# Patient Record
Sex: MALE | Race: WHITE
[De-identification: names, ages, dates, MRNs, and addresses within clinical notes are randomized per-mention and may not be internally consistent; named-entity substitution may affect disease eponyms.]

---

## 2020-03-04 ENCOUNTER — HOSPITAL ENCOUNTER (INPATIENT)
Dept: HOSPITAL 62 - ER | Age: 85
LOS: 26 days | Discharge: NURSING, REHAB OR LONG-TERM CARE | DRG: 193 | End: 2020-03-30
Attending: INTERNAL MEDICINE | Admitting: INTERNAL MEDICINE
Payer: MEDICARE

## 2020-03-04 DIAGNOSIS — R13.12: ICD-10-CM

## 2020-03-04 DIAGNOSIS — D64.9: ICD-10-CM

## 2020-03-04 DIAGNOSIS — E83.51: ICD-10-CM

## 2020-03-04 DIAGNOSIS — I50.23: ICD-10-CM

## 2020-03-04 DIAGNOSIS — Z78.1: ICD-10-CM

## 2020-03-04 DIAGNOSIS — J10.08: Primary | ICD-10-CM

## 2020-03-04 DIAGNOSIS — S37.29XA: ICD-10-CM

## 2020-03-04 DIAGNOSIS — J44.1: ICD-10-CM

## 2020-03-04 DIAGNOSIS — I11.0: ICD-10-CM

## 2020-03-04 DIAGNOSIS — K50.90: ICD-10-CM
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
## 2020-03-04 DIAGNOSIS — F03.90: ICD-10-CM

## 2020-03-04 DIAGNOSIS — J44.0: ICD-10-CM

## 2020-03-04 DIAGNOSIS — T50.915A: ICD-10-CM

## 2020-03-04 DIAGNOSIS — Z87.891: ICD-10-CM

## 2020-03-04 DIAGNOSIS — E78.5: ICD-10-CM

## 2020-03-04 DIAGNOSIS — R00.1: ICD-10-CM

## 2020-03-04 DIAGNOSIS — R31.9: ICD-10-CM

## 2020-03-04 DIAGNOSIS — E87.1: ICD-10-CM

## 2020-03-04 DIAGNOSIS — R33.9: ICD-10-CM

## 2020-03-04 DIAGNOSIS — J96.01: ICD-10-CM

## 2020-03-04 DIAGNOSIS — I48.19: ICD-10-CM

## 2020-03-04 DIAGNOSIS — J15.4: ICD-10-CM

## 2020-03-04 LAB
A TYPE INFLUENZA AG: POSITIVE
ADD MANUAL DIFF: NO
ALBUMIN SERPL-MCNC: 4.6 G/DL (ref 3.5–5)
ALP SERPL-CCNC: 90 U/L (ref 38–126)
ANION GAP SERPL CALC-SCNC: 17 MMOL/L (ref 5–19)
AST SERPL-CCNC: 31 U/L (ref 17–59)
B INFLUENZA AG: NEGATIVE
BASE EXCESS BLDV CALC-SCNC: -4.5 MMOL/L
BASOPHILS # BLD AUTO: 0.1 10^3/UL (ref 0–0.2)
BASOPHILS NFR BLD AUTO: 0.5 % (ref 0–2)
BILIRUB DIRECT SERPL-MCNC: 0.9 MG/DL (ref 0–0.4)
BILIRUB SERPL-MCNC: 2.3 MG/DL (ref 0.2–1.3)
BUN SERPL-MCNC: 16 MG/DL (ref 7–20)
CALCIUM: 8.8 MG/DL (ref 8.4–10.2)
CHLORIDE SERPL-SCNC: 95 MMOL/L (ref 98–107)
CK SERPL-CCNC: 128 U/L (ref 55–170)
CO2 SERPL-SCNC: 22 MMOL/L (ref 22–30)
EOSINOPHIL # BLD AUTO: 0.1 10^3/UL (ref 0–0.6)
EOSINOPHIL NFR BLD AUTO: 0.5 % (ref 0–6)
ERYTHROCYTE [DISTWIDTH] IN BLOOD BY AUTOMATED COUNT: 18.3 % (ref 11.5–14)
GLUCOSE SERPL-MCNC: 136 MG/DL (ref 75–110)
HCO3 BLDV-SCNC: 22.2 MMOL/L (ref 20–32)
HCT VFR BLD CALC: 36.4 % (ref 37.9–51)
HGB BLD-MCNC: 11.8 G/DL (ref 13.5–17)
INR PPP: 1.44
LYMPHOCYTES # BLD AUTO: 4.6 10^3/UL (ref 0.5–4.7)
LYMPHOCYTES NFR BLD AUTO: 25.8 % (ref 13–45)
MCH RBC QN AUTO: 29.2 PG (ref 27–33.4)
MCHC RBC AUTO-ENTMCNC: 32.4 G/DL (ref 32–36)
MCV RBC AUTO: 90 FL (ref 80–97)
MONOCYTES # BLD AUTO: 1.2 10^3/UL (ref 0.1–1.4)
MONOCYTES NFR BLD AUTO: 6.7 % (ref 3–13)
NEUTROPHILS # BLD AUTO: 12 10^3/UL (ref 1.7–8.2)
NEUTS SEG NFR BLD AUTO: 66.5 % (ref 42–78)
PCO2 BLDV: 46.8 MMHG (ref 35–63)
PH BLDV: 7.29 [PH] (ref 7.3–7.42)
PLATELET # BLD: 244 10^3/UL (ref 150–450)
POTASSIUM SERPL-SCNC: 4.4 MMOL/L (ref 3.6–5)
PROT SERPL-MCNC: 7.7 G/DL (ref 6.3–8.2)
PROTHROMBIN TIME: 17.7 SEC (ref 11.4–15.4)
RBC # BLD AUTO: 4.04 10^6/UL (ref 4.35–5.55)
TOTAL CELLS COUNTED % (AUTO): 100 %
TROPONIN I SERPL-MCNC: 0.06 NG/ML
WBC # BLD AUTO: 18 10^3/UL (ref 4–10.5)

## 2020-03-04 PROCEDURE — 96375 TX/PRO/DX INJ NEW DRUG ADDON: CPT

## 2020-03-04 PROCEDURE — 84439 ASSAY OF FREE THYROXINE: CPT

## 2020-03-04 PROCEDURE — 5A09557 ASSISTANCE WITH RESPIRATORY VENTILATION, GREATER THAN 96 CONSECUTIVE HOURS, CONTINUOUS POSITIVE AIRWAY PRESSURE: ICD-10-PCS | Performed by: INTERNAL MEDICINE

## 2020-03-04 PROCEDURE — 99291 CRITICAL CARE FIRST HOUR: CPT

## 2020-03-04 PROCEDURE — 80202 ASSAY OF VANCOMYCIN: CPT

## 2020-03-04 PROCEDURE — 96365 THER/PROPH/DIAG IV INF INIT: CPT

## 2020-03-04 PROCEDURE — 85610 PROTHROMBIN TIME: CPT

## 2020-03-04 PROCEDURE — 85027 COMPLETE CBC AUTOMATED: CPT

## 2020-03-04 PROCEDURE — 87150 DNA/RNA AMPLIFIED PROBE: CPT

## 2020-03-04 PROCEDURE — 81001 URINALYSIS AUTO W/SCOPE: CPT

## 2020-03-04 PROCEDURE — 84153 ASSAY OF PSA TOTAL: CPT

## 2020-03-04 PROCEDURE — 83540 ASSAY OF IRON: CPT

## 2020-03-04 PROCEDURE — 94668 MNPJ CHEST WALL SBSQ: CPT

## 2020-03-04 PROCEDURE — 83880 ASSAY OF NATRIURETIC PEPTIDE: CPT

## 2020-03-04 PROCEDURE — 83935 ASSAY OF URINE OSMOLALITY: CPT

## 2020-03-04 PROCEDURE — 80048 BASIC METABOLIC PNL TOTAL CA: CPT

## 2020-03-04 PROCEDURE — 85025 COMPLETE CBC W/AUTO DIFF WBC: CPT

## 2020-03-04 PROCEDURE — 82550 ASSAY OF CK (CPK): CPT

## 2020-03-04 PROCEDURE — 83550 IRON BINDING TEST: CPT

## 2020-03-04 PROCEDURE — 84484 ASSAY OF TROPONIN QUANT: CPT

## 2020-03-04 PROCEDURE — 71045 X-RAY EXAM CHEST 1 VIEW: CPT

## 2020-03-04 PROCEDURE — 83605 ASSAY OF LACTIC ACID: CPT

## 2020-03-04 PROCEDURE — 93306 TTE W/DOPPLER COMPLETE: CPT

## 2020-03-04 PROCEDURE — 80162 ASSAY OF DIGOXIN TOTAL: CPT

## 2020-03-04 PROCEDURE — 87804 INFLUENZA ASSAY W/OPTIC: CPT

## 2020-03-04 PROCEDURE — 87186 SC STD MICRODIL/AGAR DIL: CPT

## 2020-03-04 PROCEDURE — 87086 URINE CULTURE/COLONY COUNT: CPT

## 2020-03-04 PROCEDURE — 85730 THROMBOPLASTIN TIME PARTIAL: CPT

## 2020-03-04 PROCEDURE — 84295 ASSAY OF SERUM SODIUM: CPT

## 2020-03-04 PROCEDURE — 84300 ASSAY OF URINE SODIUM: CPT

## 2020-03-04 PROCEDURE — 82040 ASSAY OF SERUM ALBUMIN: CPT

## 2020-03-04 PROCEDURE — 93010 ELECTROCARDIOGRAM REPORT: CPT

## 2020-03-04 PROCEDURE — 83930 ASSAY OF BLOOD OSMOLALITY: CPT

## 2020-03-04 PROCEDURE — 74230 X-RAY XM SWLNG FUNCJ C+: CPT

## 2020-03-04 PROCEDURE — 84481 FREE ASSAY (FT-3): CPT

## 2020-03-04 PROCEDURE — 83735 ASSAY OF MAGNESIUM: CPT

## 2020-03-04 PROCEDURE — 87040 BLOOD CULTURE FOR BACTERIA: CPT

## 2020-03-04 PROCEDURE — 82533 TOTAL CORTISOL: CPT

## 2020-03-04 PROCEDURE — 82570 ASSAY OF URINE CREATININE: CPT

## 2020-03-04 PROCEDURE — 82746 ASSAY OF FOLIC ACID SERUM: CPT

## 2020-03-04 PROCEDURE — 36600 WITHDRAWAL OF ARTERIAL BLOOD: CPT

## 2020-03-04 PROCEDURE — 82272 OCCULT BLD FECES 1-3 TESTS: CPT

## 2020-03-04 PROCEDURE — 84100 ASSAY OF PHOSPHORUS: CPT

## 2020-03-04 PROCEDURE — 87077 CULTURE AEROBIC IDENTIFY: CPT

## 2020-03-04 PROCEDURE — 82962 GLUCOSE BLOOD TEST: CPT

## 2020-03-04 PROCEDURE — 94667 MNPJ CHEST WALL 1ST: CPT

## 2020-03-04 PROCEDURE — 85045 AUTOMATED RETICULOCYTE COUNT: CPT

## 2020-03-04 PROCEDURE — 80053 COMPREHEN METABOLIC PANEL: CPT

## 2020-03-04 PROCEDURE — 94799 UNLISTED PULMONARY SVC/PX: CPT

## 2020-03-04 PROCEDURE — 82728 ASSAY OF FERRITIN: CPT

## 2020-03-04 PROCEDURE — 84443 ASSAY THYROID STIM HORMONE: CPT

## 2020-03-04 PROCEDURE — 82607 VITAMIN B-12: CPT

## 2020-03-04 PROCEDURE — 82803 BLOOD GASES ANY COMBINATION: CPT

## 2020-03-04 PROCEDURE — 36415 COLL VENOUS BLD VENIPUNCTURE: CPT

## 2020-03-04 PROCEDURE — 94660 CPAP INITIATION&MGMT: CPT

## 2020-03-04 PROCEDURE — 93005 ELECTROCARDIOGRAM TRACING: CPT

## 2020-03-04 NOTE — RADIOLOGY REPORT (SQ)
EXAM DESCRIPTION: 



XR CHEST 1 VIEW



COMPLETED DATE/TME:  03/04/2020 22:01



CLINICAL HISTORY: 



86 years, Male, sob



COMPARISON:

None.



NUMBER OF VIEWS:

1



TECHNIQUE:

Portable chest



LIMITATIONS:

None.



FINDINGS:



Cardiomegaly. Calcified granuloma in the right upper lobe.

Subsegmental atelectasis in the lung bases. Equivocal/mild

interstitial edema. No pneumothorax. Atheromatous change thoracic

aorta. Osteopenia



IMPRESSION:



Cardiomegaly with equivocal/mild interstitial edema

 



copyright 2011 [employer]- All Rights Reserved

## 2020-03-04 NOTE — ER DOCUMENT REPORT
ED General





- General


Chief Complaint: Shortness Of Breath


Stated Complaint: DIFFICULTY BREATHING


Mode of Arrival: Medic


Information source: Patient, Relative, Emergency Med Personnel


Cannot obtain history due to: Unstable vital signs


Notes: 





Patient is an 86-year-old male presenting to the emergency department chief 

complaint of respiratory distress.  EMS state that they were called to the pa

[facility] [place] found the patient to be tachycardic and tachypneic they state that

on room air the patient's oxygen saturation was 77%.  On presentation to the 

emergency department the patient has received 2 DuoNeb breathing treatments 125 

mg of Solu-Medrol IV and is currently on CPAP by EMS.  They state that the 

patient was quite anxious and kept trying to take the CPAP off.  When speaking 

with family once they arrived they state that the patient as well as many other 

family members have been ill recently but the patient over the past 3 days has 

been much more severe and even more so this evening.  Prior to this event the 

patient's last illness was pneumonia approximately 2 to 3 months ago.  Patient 

denies travel history trauma history.  He does have sick contacts at home.  

Patient states he has been taking his medication as prescribed.


TRAVEL OUTSIDE OF THE [place] IN LAST 30 DAYS: No





- HPI


Onset: Last week


Onset/Duration: Gradual, Worse


Quality of pain: Pressure


Severity: Mild


Pain Level: 2


Associated symptoms: Nonproductive cough, Hurts to breath, Slow to respond


Exacerbated by: Movement, Walking, Coughing, Deep breathing


Relieved by: Denies


Similar symptoms previously: Yes


Recently seen / treated by doctor: No





- Related Data


Allergies/Adverse Reactions: 


                                        





Penicillins Allergy (Verified 03/04/20 22:01)


   











Past Medical History





- General


Information source: Patient, Relative, [facility] Records





- Social History


Smoking Status: Former Smoker


Cigarette use (# per day): No


Smoking Education Provided: No


Frequency of alcohol use: None


Drug Abuse: None


Lives with: Family


Family History: Reviewed & Not Pertinent


Patient has suicidal ideation: No


Patient has homicidal ideation: No





- Past Medical History


Cardiac Medical History: Reports: Hx Atrial Fibrillation, Hx Congestive Heart 

Failure, Hx Hypercholesterolemia, Hx Hypertension


Pulmonary Medical History: Reports: Hx COPD





Review of Systems





- Review of Systems


Notes: 





REVIEW OF SYSTEMS:





CONSTITUTIONAL : Per HPI





EENT:   Denies eye, ear, throat, or mouth pain or symptoms.  Denies nasal or 

sinus congestion.





CARDIOVASCULAR:  Denies chest pain.





RESPIRATORY: Per HPI





GASTROINTESTINAL: Patient reports palpitations tachycardia and exacerbation of 

atrial fibrillation





GENITOURINARY:  Denies difficulty urinating, painful urination, burning, 

frequency, or blood in urine.








MUSCULOSKELETAL:  Denies neck or back pain or joint pain or swelling.





SKIN:   Family states patient is more pallorous than normal.





HEMATOLOGIC :   Denies easy bruising or bleeding.





NEUROLOGICAL:  Denies altered mental status or loss of consciousness.  Denies 

headache.  Denies weakness or paralysis or loss of use of either side.  Denies 

problems with gait or speech.  Denies sensory or motor loss.  However patient is

markedly anxious.





PSYCHIATRIC:  Denies suicidal or homicidal ideations





10 Systems are negative unless otherwise specified above





Physical Exam





- Vital signs


Vitals: 


                                        











Temp Pulse Ox


 


 100.2 F   89 L


 


 03/04/20 21:48  03/04/20 21:48














- Notes


Notes: 





PHYSICAL EXAMINATION:


 


GENERAL: Patient is an 86-year-old male presenting to the emergency department 

in acute respiratory distress.


 


HEAD: Atraumatic, normocephalic.


 


EYES: Pupils equal round and reactive to light, extraocular movements intact, 

sclera anicteric, conjunctiva are normal.


 


ENT: nares patent, oropharynx clear without exudates.  Moist mucous membranes.


 


NECK: Normal range of motion, supple without lymphadenopathy, no appreciable JVD


 


LUNGS: Poor air exchange positive accessory muscle use to intercostals and neck 

muscles, very quiet lung sounds


 


HEART: Irregularly irregular and tachycardic heart rate


 


ABDOMEN: Soft, nontender, normal bowel sounds.  No guarding, no rebound.  No mas

ses appreciated.


 


EXTREMITIES: Active full range of motion, no pitting or edema.  No cyanosis. 2+ 

pulses x4


 


NEUROLOGICAL: No focal neurological deficits. Moves all extremities 

spontaneously and on command.


 


SKIN: Febrile however patient is also pallorous.





Course





- Re-evaluation


Re-evalutation: 





03/05/20 00:38


At time of presentation patient was transferred to hospital bed from EMS [facility] 

IV access was obtained blood work was obtained and sent patient was changed from

CPAP by EMS to BiPAP per respiratory.  I did request 2 g of IV magnesium for 

assistance with patient's respiratory function as well as 25 mg of Cardizem IV f

ollowed by titration of Cardizem drip.  Patient received IV acetaminophen for 

management of his fever.  EKG was obtained as well as portable chest x-ray.


Patient has been maintained on a cardiac monitor the entire time in the 

emergency department.  Patient's heart rate initially on presentation was in the

130s 140s and after Cardizem bolus decelerated to the 1 teens and subsequently 

to 88 bpm.  Patient is tolerating BiPAP and has much better color.


I have reviewed patient's EKG labs and radiologic studies.  I feel it is in the 

patient's best interest for admission to the hospitalist service for management 

of COPD exacerbation and CHF exacerbation and A. fib with RVR.


03/05/20 00:42


Patient was never initiated on sepsis fluid hydration I believe his tachycardia 

was secondary to the A. fib with RVR and there is no signs of pneumonia or other

sign of infection.  I did consult with the hospitalist and he was in agreement 

no antibiotics have been initiated at this time.





- Vital Signs


Vital signs: 


                                        











Temp Pulse Resp BP Pulse Ox


 


 98.9 F      30 H  136/50 H  93 


 


 03/04/20 23:01     03/04/20 23:31  03/04/20 23:31  03/04/20 23:31














- Laboratory


Result Diagrams: 


                                 03/04/20 21:51





                                 03/04/20 21:51


Laboratory results interpreted by me: 


                                        











  03/04/20 03/04/20 03/04/20





  21:51 21:51 21:51


 


WBC  18.0 H  


 


RBC  4.04 L  


 


Hgb  11.8 L  


 


Hct  36.4 L  


 


RDW  18.3 H  


 


Absolute Neuts (auto)  12.0 H  


 


PT   17.7 H 


 


VBG pH   


 


Sodium    134.3 L


 


Chloride    95 L


 


Glucose    136 H


 


POC Glucose   


 


Lactic Acid   


 


Total Bilirubin    2.3 H


 


Direct Bilirubin    0.9 H


 


NT-Pro-B Natriuret Pep   














  03/04/20 03/04/20 03/04/20





  21:51 21:51 21:51


 


WBC   


 


RBC   


 


Hgb   


 


Hct   


 


RDW   


 


Absolute Neuts (auto)   


 


PT   


 


VBG pH    7.29 L


 


Sodium   


 


Chloride   


 


Glucose   


 


POC Glucose   


 


Lactic Acid   6.1 H 


 


Total Bilirubin   


 


Direct Bilirubin   


 


NT-Pro-B Natriuret Pep  5170 H  














  03/04/20





  22:03


 


WBC 


 


RBC 


 


Hgb 


 


Hct 


 


RDW 


 


Absolute Neuts (auto) 


 


PT 


 


VBG pH 


 


Sodium 


 


Chloride 


 


Glucose 


 


POC Glucose  225 H


 


Lactic Acid 


 


Total Bilirubin 


 


Direct Bilirubin 


 


NT-Pro-B Natriuret Pep 














- Diagnostic Test


Radiology reviewed: Reports reviewed





- EKG Interpretation by Me


Rate: Tachycardia


Rhythm: A.Fib


When compared to previous EKG there are: Previous EKG unavailable


Additional EKG results interpreted by me: 





03/05/20 00:37


EKG initially demonstrates atrial fibrillation 110 bpm there is ST depression in

anterolateral leads.  Repeat EKG performed at 2329 hrs. demonstrates A. fib with

a prolonged QT of 504 ms rate of 88 bpm.  ST depressions are still present but 

not quite as prominent.  There was no old EKG initially for comparison.





Critical Care Note





- Critical Care Note


Total time excluding time spent on procedures (mins): 40


Comments: 





Please allow 40 minutes of critical care time exclusive of separately billable 

procedures for multiple re-evaluations medical management and consultation with 

family members and hospitalist in regards to this critically ill patient.





Discharge





- Discharge


Clinical Impression: 


 Atrial fibrillation with RVR, COPD exacerbation





CHF exacerbation


Qualifiers:


 Heart failure type: unspecified Qualified Code(s): I50.9 - Heart failure, 

unspecified





Condition: Fair


Disposition: ADMITTED AS INPATIENT


Admitting Provider: Pennie


Unit Admitted: [facility]

## 2020-03-05 RX ADMIN — ENOXAPARIN SODIUM SCH MG: 40 INJECTION SUBCUTANEOUS at 11:04

## 2020-03-05 RX ADMIN — FUROSEMIDE SCH MG: 10 INJECTION, SOLUTION INTRAMUSCULAR; INTRAVENOUS at 06:01

## 2020-03-05 RX ADMIN — DIGOXIN SCH: 0.12 TABLET ORAL at 11:00

## 2020-03-05 RX ADMIN — DILTIAZEM HYDROCHLORIDE SCH MG: 60 TABLET, FILM COATED ORAL at 14:40

## 2020-03-05 RX ADMIN — METHYLPREDNISOLONE SODIUM SUCCINATE SCH MG: 40 INJECTION, POWDER, FOR SOLUTION INTRAMUSCULAR; INTRAVENOUS at 06:09

## 2020-03-05 RX ADMIN — CEFTRIAXONE SCH MLS/HR: 2 INJECTION, SOLUTION INTRAVENOUS at 21:29

## 2020-03-05 RX ADMIN — DOXYCYCLINE SCH MLS/HR: 100 INJECTION, POWDER, LYOPHILIZED, FOR SOLUTION INTRAVENOUS at 11:04

## 2020-03-05 RX ADMIN — DOXYCYCLINE SCH MLS/HR: 100 INJECTION, POWDER, LYOPHILIZED, FOR SOLUTION INTRAVENOUS at 21:30

## 2020-03-05 RX ADMIN — FUROSEMIDE SCH MG: 10 INJECTION, SOLUTION INTRAMUSCULAR; INTRAVENOUS at 17:43

## 2020-03-05 RX ADMIN — METHYLPREDNISOLONE SODIUM SUCCINATE SCH MG: 40 INJECTION, POWDER, FOR SOLUTION INTRAMUSCULAR; INTRAVENOUS at 17:47

## 2020-03-05 RX ADMIN — CAPTOPRIL SCH: 25 TABLET ORAL at 11:00

## 2020-03-05 RX ADMIN — CAPTOPRIL SCH MG: 25 TABLET ORAL at 21:28

## 2020-03-05 RX ADMIN — DILTIAZEM HYDROCHLORIDE SCH MG: 60 TABLET, FILM COATED ORAL at 21:28

## 2020-03-05 NOTE — EKG REPORT
SEVERITY:- ABNORMAL ECG -

ATRIAL FIBRILLATION

NONSPECIFIC REPOL ABNORMALITY, DIFFUSE LEADS , SLIGHTLY IMPROVED FROM EARLY  21:59 EKG, CLINICAL MARLENA
ELATION NEEDED.

PROLONGED QT INTERVAL

:

Confirmed by: Devante Serrato MD 05-Mar-2020 07:20:08

## 2020-03-05 NOTE — EKG REPORT
SEVERITY:- ABNORMAL ECG -

ATRIAL FIBRILLATION

PROBABLE LVH WITH SECONDARY REPOL ABNRM

ST DEPRESSION, CONSIDER ISCHEMIA, ANT-LAT LDS

:

Confirmed by: Devante Serrato MD 05-Mar-2020 07:20:27

## 2020-03-05 NOTE — PDOC H&P
History of Present Illness


Admission Date/PCP: 


  03/04/20 23:54





  INEZ WAYNE PA-C





History of Present Illness: 


LUIS F VELA is a 86 year old male with past medical history significant for 

gavino A. fib, HTN, HLD, COPD, Crohn's disease who presented to ED with severe 

respiratory distress that awoke him from sleep.  Patient contracted a viral 

upper respiratory illness proximately 3 days prior to admission and this became 

worse gradually causing productive cough shortness of breath/ROBERTSON.  Patient does 

not use home oxygen prior to admission.  He was requiring BiPAP to maintain 

saturations on admission.  Given steroids, nebulizers, antibiotics on admission.

 Chest x-ray did not specifically show pneumonia the lungs had scattered 

congestion on exam.  Patient noted to be in A. fib with RVR on admission with 

rates in the 150s and is promptly came down with diltiazem drip with a rate of 1

0mg/h.  Daughter is in charge of patient's medications and states he has not 

missed any doses including his digoxin and diltiazem. 








Past Medical History


Cardiac Medical History: Reports: Atrial Fibrillation, Congestive Heart Failure,

Hyperlipidema, Hypertension


Pulmonary Medical History: Reports: Chronic Obstructive Pulmonary Disease (COPD)





Past Surgical History


Past Surgical History: Reports: Vascular Surgery





Social History


Lives with: Family


Smoking Status: Former Smoker





- Advance Directive


Resuscitation Status: Do Not Resuscitate


Surrogate healthcare decision maker:: 





Daughter





Family History


Family History: Reviewed & Not Pertinent


Parental Family History Reviewed: Yes


Children Family History Reviewed: Yes


Sibling(s) Family History Reviewed.: Yes





Medication/Allergy


Allergies/Adverse Reactions: 


                                        





Penicillins Allergy (Verified 03/04/20 22:01)


   











Review of Systems


All systems: reviewed and no additional remarkable complaints except as stated -

As per HPI, otherwise negative





Physical Exam


Vital Signs: 


                                        











Temp Pulse Resp BP Pulse Ox


 


 98.9 F      27 H  122/49 L  96 


 


 03/04/20 23:01     03/05/20 00:46  03/05/20 00:46  03/05/20 00:46








                                 Intake & Output











 03/03/20 03/04/20 03/05/20





 06:59 06:59 06:59


 


Intake Total   132


 


Balance   132


 


Weight   68.039 kg











General appearance: PRESENT: cooperative, mild distress


Head exam: PRESENT: atraumatic, normocephalic


Eye exam: PRESENT: conjunctiva pink


Mouth exam: PRESENT: moist


Respiratory exam: PRESENT: accessory muscle use, crackles, decreased breath 

sounds, tachypnea, wheezes


Cardiovascular exam: PRESENT: irregular rhythm.  ABSENT: diastolic murmur, rubs,

systolic murmur, tachycardia


GI/Abdominal exam: PRESENT: normal bowel sounds, soft.  ABSENT: distended, 

guarding, mass, organolmegaly, rebound, tenderness


Rectal exam: PRESENT: deferred


Extremities exam: ABSENT: pedal edema


Neurological exam: PRESENT: alert, awake, oriented to person


Psychiatric exam: PRESENT: appropriate affect, normal mood


Skin exam: PRESENT: dry, intact, warm





Results


Laboratory Results: 


                                        





                                 03/04/20 21:51 





                                 03/04/20 21:51 





                                        











  03/04/20 03/04/20 03/04/20





  21:51 21:51 21:51


 


WBC  18.0 H  


 


RBC  4.04 L  


 


Hgb  11.8 L  


 


Hct  36.4 L  


 


MCV  90  


 


MCH  29.2  


 


MCHC  32.4  


 


RDW  18.3 H  


 


Plt Count  244  


 


Seg Neutrophils %  66.5  


 


VBG pH   


 


VBG pCO2   


 


VBG HCO3   


 


VBG Base Excess   


 


Sodium   134.3 L 


 


Potassium   4.4 


 


Chloride   95 L 


 


Carbon Dioxide   22 


 


Anion Gap   17 


 


BUN   16 


 


Creatinine   1.03 


 


Est GFR ( Amer)   > 60 


 


Glucose   136 H 


 


Lactic Acid    6.1 H


 


Calcium   8.8 


 


Total Bilirubin   2.3 H 


 


AST   31 


 


Alkaline Phosphatase   90 


 


Total Protein   7.7 


 


Albumin   4.6 














  03/04/20 03/05/20





  21:51 00:20


 


WBC  


 


RBC  


 


Hgb  


 


Hct  


 


MCV  


 


MCH  


 


MCHC  


 


RDW  


 


Plt Count  


 


Seg Neutrophils %  


 


VBG pH  7.29 L 


 


VBG pCO2  46.8 


 


VBG HCO3  22.2 


 


VBG Base Excess  -4.5 


 


Sodium  


 


Potassium  


 


Chloride  


 


Carbon Dioxide  


 


Anion Gap  


 


BUN  


 


Creatinine  


 


Est GFR (African Amer)  


 


Glucose  


 


Lactic Acid   1.8


 


Calcium  


 


Total Bilirubin  


 


AST  


 


Alkaline Phosphatase  


 


Total Protein  


 


Albumin  








                                        











  03/04/20 03/04/20 03/05/20





  21:51 21:51 00:20


 


Creatine Kinase  128  


 


Troponin I   0.057  0.152


 


NT-Pro-B Natriuret Pep   5170 H 











Impressions: 


                                        





Chest X-Ray  03/04/20 22:01


IMPRESSION:


 


Cardiomegaly with equivocal/mild interstitial edema


 


 


copyright 2011 [employer]- All Rights Reserved


 














Assessment and Plan





- Diagnosis


(1) Atypical pneumonia


Is this a current diagnosis for this admission?: Yes   


Plan: 





Suspect atypical pneumonia, possibly gram-negative, likely bacterial infection 

followed viral illness


Ceftriaxone/doxycycline (azithromycin cannot be used due to very long QTC on 

admission)


Respiratory culture


Oxygen as needed with goal 89 to 94%


Bronchial hygiene








(2) COPD exacerbation


Is this a current diagnosis for this admission?: Yes   


Plan: 





Antibiotics


Nebulizer treatments


Steroids, transition to oral when able








(3) CHF exacerbation


Qualifiers: 


   Heart failure type: unspecified   Qualified Code(s): I50.9 - Heart failure, 

unspecified   


Is this a current diagnosis for this admission?: Yes   


Plan: 





BNP elevated to over 5000; lungs slightly wet on exam


Likely due to A. fib, expect improvement with rate control


Continue home cardiac medications


Avoid IV fluids


Low-dose IV Lasix then reassess volume status and hemodynamics; may not need 

this long-term








(4) Atrial fibrillation with RVR


Is this a current diagnosis for this admission?: Yes   


Plan: 





Acute on chronic


Likely due to respiratory failure and hypoxia


Diltiazem IV, transition back to oral diltiazem when able


Digoxin restarted








(5) HTN (hypertension)


Is this a current diagnosis for this admission?: Yes   


Plan: 





Home medications selectively restarted








(6) Crohn's disease


Is this a current diagnosis for this admission?: Yes   


Plan: 





Takes Pentasa at home, resume this after confirming dose with pharmacy, 

daughter did not recall dose on admission; suspect it will be 1 g taken 4 times 

daily








(7) Dementia


Is this a current diagnosis for this admission?: Yes   


Plan: 





Progressive memory loss and cognitive impairment per daughter, never officially

diagnosed with dementia per her


Suspect Alzheimer's








(8) HLD (hyperlipidemia)


Is this a current diagnosis for this admission?: Yes   


Plan: 





Statin








(9) Chronic a-fib


Is this a current diagnosis for this admission?: Yes   


Plan: 





As above








- Time


Time Spent with patient: 35 or more minutes


Medications reviewed and adjusted accordingly: Yes





- Inpatient Certification


Medical Necessity: Significant Comorbidiites Make Outpatient Treatment Too 

Risky, Need Close Monitoring Due to Risk of Patient Decompensation, Need for IV 

Antibiotics

## 2020-03-05 NOTE — ADVANCED CARE
- Diagnosis


(1) Atypical pneumonia


Diagnosis Current: Yes   





(2) COPD exacerbation


Diagnosis Current: Yes   





(3) CHF exacerbation


Diagnosis Current: Yes   





(4) Atrial fibrillation with RVR


Diagnosis Current: Yes   





(5) HTN (hypertension)


Diagnosis Current: Yes   





(6) Crohn's disease


Diagnosis Current: Yes   





(7) Dementia


Diagnosis Current: Yes   





(8) HLD (hyperlipidemia)


Diagnosis Current: Yes   





(9) Chronic a-fib


Diagnosis Current: Yes   


Attendance: 





patient


daughter


Resuscitation Status: Do Not Resuscitate


Discussion: 





All aspects of CODE STATUS discussed including chest compressions, 

cardioversion, intubation, patient stated he wishes to be DNR/DNI.  His daughter

confirmed these wishes and states she is his chosen and POA, patient agreed.  

Jessica Wright [phone]


Time Spent: 17 minutes

## 2020-03-05 NOTE — PROGRESS NOTE
Provider Note


Provider Note: 





3/5/2020


Patient is flu A positive.  Patient was sick for 3 days prior to coming into the

hospital, with upper respiratory symptoms


Patient states he is feeling some better today as opposed to when he was 

admitted.  Patient lives with his daughter and son-in-law here in [place]. 

Patient is out of the window for any type of antiviral medications

## 2020-03-06 LAB
%HYPO/RBC NFR BLD AUTO: SLIGHT %
ABSOLUTE LYMPHOCYTES# (MANUAL): 0.4 10^3/UL (ref 0.5–4.7)
ABSOLUTE MONOCYTES # (MANUAL): 0.7 10^3/UL (ref 0.1–1.4)
ADD MANUAL DIFF: YES
ANION GAP SERPL CALC-SCNC: 10 MMOL/L (ref 5–19)
ANISOCYTOSIS BLD QL SMEAR: (no result)
BASOPHILS NFR BLD MANUAL: 0 % (ref 0–2)
BUN SERPL-MCNC: 28 MG/DL (ref 7–20)
CALCIUM: 8.9 MG/DL (ref 8.4–10.2)
CHLORIDE SERPL-SCNC: 96 MMOL/L (ref 98–107)
CO2 SERPL-SCNC: 24 MMOL/L (ref 22–30)
EOSINOPHIL NFR BLD MANUAL: 0 % (ref 0–6)
ERYTHROCYTE [DISTWIDTH] IN BLOOD BY AUTOMATED COUNT: 17.4 % (ref 11.5–14)
FREE T4 (FREE THYROXINE): 1.27 NG/DL (ref 0.78–2.19)
GLUCOSE SERPL-MCNC: 164 MG/DL (ref 75–110)
HCT VFR BLD CALC: 31.9 % (ref 37.9–51)
HGB BLD-MCNC: 10.9 G/DL (ref 13.5–17)
MCH RBC QN AUTO: 29.1 PG (ref 27–33.4)
MCHC RBC AUTO-ENTMCNC: 34.1 G/DL (ref 32–36)
MCV RBC AUTO: 85 FL (ref 80–97)
MONOCYTES % (MANUAL): 5 % (ref 3–13)
PHOSPHATE SERPL-MCNC: 3.9 MG/DL (ref 2.5–4.5)
PLATELET # BLD: 189 10^3/UL (ref 150–450)
PLATELET COMMENT: ADEQUATE
POLYCHROMASIA BLD QL SMEAR: SLIGHT
POTASSIUM SERPL-SCNC: 3.8 MMOL/L (ref 3.6–5)
RBC # BLD AUTO: 3.75 10^6/UL (ref 4.35–5.55)
SEGMENTED NEUTROPHILS % (MAN): 92 % (ref 42–78)
T3FREE SERPL-MCNC: 2.85 PG/ML (ref 2.77–5.27)
TOTAL CELLS COUNTED BLD: 100
VARIANT LYMPHS NFR BLD MANUAL: 3 % (ref 13–45)
WBC # BLD AUTO: 13 10^3/UL (ref 4–10.5)

## 2020-03-06 RX ADMIN — FUROSEMIDE SCH MG: 10 INJECTION, SOLUTION INTRAMUSCULAR; INTRAVENOUS at 05:42

## 2020-03-06 RX ADMIN — METHYLPREDNISOLONE SODIUM SUCCINATE SCH: 40 INJECTION, POWDER, FOR SOLUTION INTRAMUSCULAR; INTRAVENOUS at 07:42

## 2020-03-06 RX ADMIN — CEFTRIAXONE SCH MLS/HR: 2 INJECTION, SOLUTION INTRAVENOUS at 21:08

## 2020-03-06 RX ADMIN — IPRATROPIUM BROMIDE AND ALBUTEROL SULFATE PRN ML: 2.5; .5 SOLUTION RESPIRATORY (INHALATION) at 10:20

## 2020-03-06 RX ADMIN — IPRATROPIUM BROMIDE AND ALBUTEROL SULFATE PRN ML: 2.5; .5 SOLUTION RESPIRATORY (INHALATION) at 13:46

## 2020-03-06 RX ADMIN — METHYLPREDNISOLONE SODIUM SUCCINATE SCH MG: 40 INJECTION, POWDER, FOR SOLUTION INTRAMUSCULAR; INTRAVENOUS at 21:13

## 2020-03-06 RX ADMIN — FUROSEMIDE SCH MG: 10 INJECTION, SOLUTION INTRAMUSCULAR; INTRAVENOUS at 17:42

## 2020-03-06 RX ADMIN — DOXYCYCLINE SCH MLS/HR: 100 INJECTION, POWDER, LYOPHILIZED, FOR SOLUTION INTRAVENOUS at 22:03

## 2020-03-06 RX ADMIN — IPRATROPIUM BROMIDE AND ALBUTEROL SULFATE PRN ML: 2.5; .5 SOLUTION RESPIRATORY (INHALATION) at 02:17

## 2020-03-06 RX ADMIN — ENOXAPARIN SODIUM SCH MG: 40 INJECTION SUBCUTANEOUS at 11:06

## 2020-03-06 RX ADMIN — IPRATROPIUM BROMIDE AND ALBUTEROL SULFATE PRN ML: 2.5; .5 SOLUTION RESPIRATORY (INHALATION) at 20:32

## 2020-03-06 RX ADMIN — DILTIAZEM HYDROCHLORIDE SCH MG: 60 TABLET, FILM COATED ORAL at 05:42

## 2020-03-06 RX ADMIN — DOXYCYCLINE SCH MLS/HR: 100 INJECTION, POWDER, LYOPHILIZED, FOR SOLUTION INTRAVENOUS at 11:07

## 2020-03-06 RX ADMIN — DILTIAZEM HYDROCHLORIDE SCH MG: 60 TABLET, FILM COATED ORAL at 21:13

## 2020-03-06 RX ADMIN — DILTIAZEM HYDROCHLORIDE SCH MG: 60 TABLET, FILM COATED ORAL at 13:29

## 2020-03-06 RX ADMIN — DIGOXIN SCH MG: 0.12 TABLET ORAL at 11:07

## 2020-03-06 RX ADMIN — HYDRALAZINE HYDROCHLORIDE PRN MG: 20 INJECTION INTRAMUSCULAR; INTRAVENOUS at 17:48

## 2020-03-06 NOTE — PDOC PROGRESS REPORT
Subjective


Progress Note for:: 03/06/20


Reason For Visit: 


CAP,COPD EXACERBATION,ACUTE ON CHRONIC AFIB WITH


3/6/2020


Admitted with a COPD exacerbation, acute on chronic atrial fib, atypical 

pneumonia, influenza A, CHF exacerbation, hypertension, dementia, history of 

Crohn's disease





Physical Exam


Vital Signs: 


                                        











Temp Pulse Resp BP Pulse Ox


 


 97.9 F   80   22 H  157/68 H  94 


 


 03/06/20 08:00  03/06/20 08:00  03/06/20 08:00  03/06/20 08:00  03/06/20 08:00








                                 Intake & Output











 03/05/20 03/06/20 03/07/20





 06:59 06:59 06:59


 


Intake Total 612 1349 


 


Output Total  1850 


 


Balance 612 -501 


 


Weight 64.1 kg 64.4 kg 











General appearance: PRESENT: mild distress, other - Mild respiratory distress, 

better than when admitted


Respiratory exam: PRESENT: decreased breath sounds, rhonchi


Cardiovascular exam: PRESENT: RRR.  ABSENT: diastolic murmur, rubs, systolic 

murmur


Neurological exam: PRESENT: alert, awake, oriented to person, oriented to place,

oriented to time, oriented to situation, CN II-XII grossly intact.  ABSENT: 

motor sensory deficit


Psychiatric exam: PRESENT: appropriate affect, normal mood.  ABSENT: homicidal 

ideation, suicidal ideation





Results


Laboratory Results: 


                                        





                                 03/06/20 07:30 





                                 03/06/20 04:41 





                                        











  03/06/20 03/06/20 03/06/20





  04:41 04:41 04:41


 


WBC  Cancelled  


 


RBC  Cancelled  


 


Hgb  Cancelled  


 


Hct  Cancelled  


 


MCV  Cancelled  


 


MCH  Cancelled  


 


MCHC  Cancelled  


 


RDW  Cancelled  


 


Plt Count  Cancelled  


 


Seg Neutrophils %  Cancelled  


 


Sodium   130.3 L 


 


Potassium   3.8 


 


Chloride   96 L 


 


Carbon Dioxide   24 


 


Anion Gap   10 


 


BUN   28 H 


 


Creatinine   0.79 


 


Est GFR ( Amer)   > 60 


 


Glucose   164 H 


 


Calcium   8.9 


 


Phosphorus   3.9 


 


Magnesium   2.6 H 


 


TSH    0.26 L














  03/06/20





  07:30


 


WBC  13.0 H


 


RBC  3.75 L


 


Hgb  10.9 L


 


Hct  31.9 L


 


MCV  85  D


 


MCH  29.1


 


MCHC  34.1


 


RDW  17.4 H


 


Plt Count  189


 


Seg Neutrophils %  Not Reportable


 


Sodium 


 


Potassium 


 


Chloride 


 


Carbon Dioxide 


 


Anion Gap 


 


BUN 


 


Creatinine 


 


Est GFR (African Amer) 


 


Glucose 


 


Calcium 


 


Phosphorus 


 


Magnesium 


 


TSH 








                                        





03/04/20 22:39   Blood   Blood Culture (PCR) - Final


                            Streptococcus Species





                                        











  03/04/20 03/04/20 03/05/20





  21:51 21:51 00:20


 


Creatine Kinase  128  


 


Troponin I   0.057  0.152


 


NT-Pro-B Natriuret Pep   5170 H 











Impressions: 


                                        





Chest X-Ray  03/04/20 22:01


IMPRESSION:


 


Cardiomegaly with equivocal/mild interstitial edema


 


 


copyright 2011 [employer]- All Rights Reserved


 














Assessment and Plan





- Diagnosis


(1) Influenza A


Is this a current diagnosis for this admission?: Yes   





(2) Atrial fibrillation with RVR


Is this a current diagnosis for this admission?: Yes   





(3) Atypical pneumonia


Is this a current diagnosis for this admission?: Yes   





(4) CHF exacerbation


Qualifiers: 


   Heart failure type: unspecified   Qualified Code(s): I50.9 - Heart failure, 

unspecified   


Is this a current diagnosis for this admission?: Yes   





(5) COPD exacerbation


Is this a current diagnosis for this admission?: Yes   





(6) Crohn's disease


Is this a current diagnosis for this admission?: Yes   





(7) HTN (hypertension)


Is this a current diagnosis for this admission?: Yes   





- Plan Summary


Summary: 


3/6/2020


Temperature 97.9 pulse 76 blood pressure 144/58 O2 sat 94% on 4 L


We will have nursing try to wean him back down to 3 L


Patient has no history of thyroid disease will recheck free T3 and free T4


Medications include Lasix 10 mg IV every 12 hours Solu-Medrol 40 mg IV every 12 

hours Vibramycin 100 mg every 12 hours and Rocephin 2 g daily


Plus other incidental medications


Patient states he is breathing slightly better than on admission.  Patient lives

at home with his daughter and son-in-law


WBCs on admission were 18,000 today 13,000


Chemistry is grossly normal


1 blood culture grew out Streptococcus species


Will consult discharge planning to assess patient's needs at time of discharge





- Time


Time Spent with patient: 25-34 minutes

## 2020-03-07 LAB
APPEARANCE UR: (no result)
APTT PPP: (no result) S
BILIRUB UR QL STRIP: NEGATIVE
GLUCOSE UR STRIP-MCNC: NEGATIVE MG/DL
KETONES UR STRIP-MCNC: NEGATIVE MG/DL
NITRITE UR QL STRIP: NEGATIVE
PH UR STRIP: 6 [PH] (ref 5–9)
PROT UR STRIP-MCNC: 100 MG/DL
SP GR UR STRIP: 1.02
UROBILINOGEN UR-MCNC: NEGATIVE MG/DL (ref ?–2)

## 2020-03-07 RX ADMIN — IPRATROPIUM BROMIDE AND ALBUTEROL SULFATE PRN ML: 2.5; .5 SOLUTION RESPIRATORY (INHALATION) at 15:24

## 2020-03-07 RX ADMIN — IPRATROPIUM BROMIDE AND ALBUTEROL SULFATE PRN ML: 2.5; .5 SOLUTION RESPIRATORY (INHALATION) at 00:26

## 2020-03-07 RX ADMIN — DOXYCYCLINE SCH MLS/HR: 100 INJECTION, POWDER, LYOPHILIZED, FOR SOLUTION INTRAVENOUS at 21:43

## 2020-03-07 RX ADMIN — HYDRALAZINE HYDROCHLORIDE PRN MG: 20 INJECTION INTRAMUSCULAR; INTRAVENOUS at 09:17

## 2020-03-07 RX ADMIN — LORAZEPAM PRN MG: 2 INJECTION INTRAMUSCULAR; INTRAVENOUS at 12:30

## 2020-03-07 RX ADMIN — CEFTRIAXONE SCH MLS/HR: 2 INJECTION, SOLUTION INTRAVENOUS at 20:59

## 2020-03-07 RX ADMIN — IPRATROPIUM BROMIDE AND ALBUTEROL SULFATE PRN ML: 2.5; .5 SOLUTION RESPIRATORY (INHALATION) at 07:51

## 2020-03-07 RX ADMIN — FUROSEMIDE SCH MG: 10 INJECTION, SOLUTION INTRAMUSCULAR; INTRAVENOUS at 05:25

## 2020-03-07 RX ADMIN — FUROSEMIDE SCH MG: 10 INJECTION, SOLUTION INTRAMUSCULAR; INTRAVENOUS at 18:15

## 2020-03-07 RX ADMIN — METHYLPREDNISOLONE SODIUM SUCCINATE SCH MG: 40 INJECTION, POWDER, FOR SOLUTION INTRAMUSCULAR; INTRAVENOUS at 05:25

## 2020-03-07 RX ADMIN — DILTIAZEM HYDROCHLORIDE SCH MG: 60 TABLET, FILM COATED ORAL at 20:59

## 2020-03-07 RX ADMIN — METHYLPREDNISOLONE SODIUM SUCCINATE SCH MG: 40 INJECTION, POWDER, FOR SOLUTION INTRAMUSCULAR; INTRAVENOUS at 14:55

## 2020-03-07 RX ADMIN — IPRATROPIUM BROMIDE AND ALBUTEROL SULFATE PRN ML: 2.5; .5 SOLUTION RESPIRATORY (INHALATION) at 21:25

## 2020-03-07 RX ADMIN — IPRATROPIUM BROMIDE AND ALBUTEROL SULFATE PRN ML: 2.5; .5 SOLUTION RESPIRATORY (INHALATION) at 11:41

## 2020-03-07 RX ADMIN — DILTIAZEM HYDROCHLORIDE SCH: 60 TABLET, FILM COATED ORAL at 15:01

## 2020-03-07 RX ADMIN — DILTIAZEM HYDROCHLORIDE SCH MG: 60 TABLET, FILM COATED ORAL at 05:24

## 2020-03-07 RX ADMIN — METHYLPREDNISOLONE SODIUM SUCCINATE SCH MG: 40 INJECTION, POWDER, FOR SOLUTION INTRAMUSCULAR; INTRAVENOUS at 20:59

## 2020-03-07 RX ADMIN — LORAZEPAM PRN MG: 2 INJECTION INTRAMUSCULAR; INTRAVENOUS at 18:58

## 2020-03-07 RX ADMIN — DIGOXIN SCH MG: 0.12 TABLET ORAL at 09:17

## 2020-03-07 RX ADMIN — DOXYCYCLINE SCH MLS/HR: 100 INJECTION, POWDER, LYOPHILIZED, FOR SOLUTION INTRAVENOUS at 12:00

## 2020-03-07 RX ADMIN — ENOXAPARIN SODIUM SCH MG: 40 INJECTION SUBCUTANEOUS at 09:17

## 2020-03-07 NOTE — RADIOLOGY REPORT (SQ)
EXAM DESCRIPTION:  CHEST SINGLE VIEW



COMPLETED DATE/TIME:  3/7/2020 1:14 pm



REASON FOR STUDY:  respiratory distress



COMPARISON:  3/4/2020



EXAM PARAMETERS:  NUMBER OF VIEWS: One view.

TECHNIQUE: Single frontal radiographic view of the chest acquired.

RADIATION DOSE: NA

LIMITATIONS: None.



FINDINGS:  LUNGS AND PLEURA: Improved aeration of the lungs.

MEDIASTINUM AND HILAR STRUCTURES: No masses.  Contour normal.

HEART AND VASCULAR STRUCTURES: Heart enlarged with mild vascular congestion improved.

BONES: No acute findings.

HARDWARE: None in the chest.

OTHER: No other significant finding.



IMPRESSION:  Improved vascular congestion.



TECHNICAL DOCUMENTATION:  JOB ID:  [id number]

 2011 [employer]- All Rights Reserved



Reading location - IP/workstation name: [email or web address]

## 2020-03-07 NOTE — PDOC PROGRESS REPORT
Subjective


Progress Note for:: 03/07/20


Reason For Visit: 


CAP,COPD EXACERBATION,ACUTE ON CHRONIC AFIB WITH


COPD exacerbation, acute on chronic atrial fib, atypical pneumonia, influenza A,

CHF exacerbation, dementia, hypertension, history of Crohn's disease





Physical Exam


Vital Signs: 


                                        











Temp Pulse Resp BP Pulse Ox


 


 97.7 F   101 H  24 H  186/57 H  91 L


 


 03/07/20 08:50  03/07/20 08:50  03/07/20 08:50  03/07/20 08:50  03/07/20 08:50








                                 Intake & Output











 03/06/20 03/07/20 03/08/20





 06:59 06:59 07:59


 


Intake Total 1349 1490 


 


Output Total 1850 1520 


 


Balance -501 -30 


 


Weight 64.4 kg 64.2 kg 











General appearance: PRESENT: mild distress, other - Respiratory distress, 

improving slowly


Respiratory exam: PRESENT: rhonchi, wheezes


Cardiovascular exam: PRESENT: RRR.  ABSENT: diastolic murmur, rubs, systolic 

murmur


Neurological exam: PRESENT: alert, awake, oriented to person, oriented to place,

oriented to time, oriented to situation, CN II-XII grossly intact.  ABSENT: 

motor sensory deficit


Psychiatric exam: PRESENT: appropriate affect, normal mood.  ABSENT: homicidal 

ideation, suicidal ideation





Results


Laboratory Results: 


                                        





                                 03/06/20 07:30 





                                 03/06/20 04:41 





                                        











  03/06/20





  04:41


 


Free T4  1.27


 


Free T3 pg/mL  2.85








                                        





03/04/20 22:39   Blood   Blood Culture (PCR) - Final


                            Streptococcus Species


03/04/20 22:39   Blood   Blood Culture - Final


                            Strep Mitis/Oralis Grp





                                        











  03/04/20 03/04/20 03/05/20





  21:51 21:51 00:20


 


Creatine Kinase  128  


 


Troponin I   0.057  0.152


 


NT-Pro-B Natriuret Pep   5170 H 











Impressions: 


                                        





Chest X-Ray  03/04/20 22:01


IMPRESSION:


 


Cardiomegaly with equivocal/mild interstitial edema


 


 


copyright 2011 [employer]- All Rights Reserved


 














Assessment and Plan





- Diagnosis


(1) Influenza A


Is this a current diagnosis for this admission?: Yes   





(2) Atrial fibrillation with RVR


Is this a current diagnosis for this admission?: Yes   





(3) Atypical pneumonia


Is this a current diagnosis for this admission?: Yes   





(4) CHF exacerbation


Qualifiers: 


   Heart failure type: unspecified   Qualified Code(s): I50.9 - Heart failure, 

unspecified   


Is this a current diagnosis for this admission?: Yes   





(5) COPD exacerbation


Is this a current diagnosis for this admission?: Yes   





(6) Crohn's disease


Is this a current diagnosis for this admission?: Yes   





(7) HTN (hypertension)


Is this a current diagnosis for this admission?: Yes   





- Plan Summary


Summary: 


3/6/2020


Temperature 97.9 pulse 76 blood pressure 144/58 O2 sat 94% on 4 L


We will have nursing try to wean him back down to 3 L


Patient has no history of thyroid disease will recheck free T3 and free T4


Medications include Lasix 10 mg IV every 12 hours Solu-Medrol 40 mg IV every 12 

hours Vibramycin 100 mg every 12 hours and Rocephin 2 g daily


Plus other incidental medications


Patient states he is breathing slightly better than on admission.  Patient lives

at home with his daughter and son-in-law


WBCs on admission were 18,000 today 13,000


Chemistry is grossly normal


1 blood culture grew out Streptococcus species


Will consult discharge planning to assess patient's needs at time of discharge





3/7/2020


Patient is afebrile 97 7 pulse is still between 90 and 100 blood pressure is 

elevated 186/57, although earlier this morning it was 144/58


I am also going to increase his Apresoline to 20 mg as needed


Patient is on captopril 50 mg every 12 hours, Cardizem 60 mg every 8 hours, 

Lasix was increased to 20 mg IV every 12 hours, Solu-Medrol was increased to 40 

mg every 8 hours.


Patient was also on Toprol XL 25 mg daily prior to admission and I will resume 

this


Patient's oxygen saturation is between 91 and 97% on 2 L nasal cannula





Patient is on IV Vibramycin as well as Rocephin, sensitivities appear to be 

appropriate for these 2 drugs


Patient is making slow progress


Patient lives at home with family.  Patient states he was not on oxygen prior to

coming in


Chest x-ray does showed pneumonia and patient also was flu A+











- Time


Time Spent with patient: 25-34 minutes

## 2020-03-08 LAB
ALBUMIN SERPL-MCNC: 3.6 G/DL (ref 3.5–5)
ALP SERPL-CCNC: 67 U/L (ref 38–126)
ANION GAP SERPL CALC-SCNC: 6 MMOL/L (ref 5–19)
ARTERIAL BLOOD FIO2: (no result)
ARTERIAL BLOOD H2CO3: 1.19 MMOL/L (ref 1.05–1.35)
ARTERIAL BLOOD HCO3: 27.5 MMOL/L (ref 20–24)
ARTERIAL BLOOD PCO2: 39.4 MMHG (ref 35–45)
ARTERIAL BLOOD PH: 7.46 (ref 7.35–7.45)
ARTERIAL BLOOD PO2: 86 MMHG (ref 80–100)
ARTERIAL BLOOD TOTAL CO2: 28.7 MMOL/L (ref 23–27)
AST SERPL-CCNC: 25 U/L (ref 17–59)
BASE EXCESS BLDA CALC-SCNC: 3.5 MMOL/L
BILIRUB DIRECT SERPL-MCNC: 0.3 MG/DL (ref 0–0.4)
BILIRUB SERPL-MCNC: 0.5 MG/DL (ref 0.2–1.3)
BUN SERPL-MCNC: 24 MG/DL (ref 7–20)
CALCIUM: 9.2 MG/DL (ref 8.4–10.2)
CHLORIDE SERPL-SCNC: 98 MMOL/L (ref 98–107)
CO2 SERPL-SCNC: 31 MMOL/L (ref 22–30)
ERYTHROCYTE [DISTWIDTH] IN BLOOD BY AUTOMATED COUNT: 17.2 % (ref 11.5–14)
GLUCOSE SERPL-MCNC: 136 MG/DL (ref 75–110)
HCT VFR BLD CALC: 29.7 % (ref 37.9–51)
HGB BLD-MCNC: 10.7 G/DL (ref 13.5–17)
MCH RBC QN AUTO: 30.3 PG (ref 27–33.4)
MCHC RBC AUTO-ENTMCNC: 36.1 G/DL (ref 32–36)
MCV RBC AUTO: 84 FL (ref 80–97)
PLATELET # BLD: 172 10^3/UL (ref 150–450)
POTASSIUM SERPL-SCNC: 3.9 MMOL/L (ref 3.6–5)
PROT SERPL-MCNC: 6.1 G/DL (ref 6.3–8.2)
RBC # BLD AUTO: 3.53 10^6/UL (ref 4.35–5.55)
SAO2 % BLDA: 97 % (ref 94–98)
WBC # BLD AUTO: 7.8 10^3/UL (ref 4–10.5)

## 2020-03-08 RX ADMIN — DILTIAZEM HYDROCHLORIDE SCH: 60 TABLET, FILM COATED ORAL at 14:53

## 2020-03-08 RX ADMIN — CEFTRIAXONE SCH MLS/HR: 2 INJECTION, SOLUTION INTRAVENOUS at 21:00

## 2020-03-08 RX ADMIN — METHYLPREDNISOLONE SODIUM SUCCINATE SCH MG: 40 INJECTION, POWDER, FOR SOLUTION INTRAMUSCULAR; INTRAVENOUS at 14:58

## 2020-03-08 RX ADMIN — DIGOXIN SCH: 0.12 TABLET ORAL at 09:49

## 2020-03-08 RX ADMIN — METHYLPREDNISOLONE SODIUM SUCCINATE SCH MG: 40 INJECTION, POWDER, FOR SOLUTION INTRAMUSCULAR; INTRAVENOUS at 05:54

## 2020-03-08 RX ADMIN — HALOPERIDOL LACTATE SCH: 5 INJECTION, SOLUTION INTRAMUSCULAR at 17:29

## 2020-03-08 RX ADMIN — DOXYCYCLINE SCH MLS/HR: 100 INJECTION, POWDER, LYOPHILIZED, FOR SOLUTION INTRAVENOUS at 22:01

## 2020-03-08 RX ADMIN — IPRATROPIUM BROMIDE AND ALBUTEROL SULFATE PRN ML: 2.5; .5 SOLUTION RESPIRATORY (INHALATION) at 20:11

## 2020-03-08 RX ADMIN — FUROSEMIDE SCH MG: 10 INJECTION, SOLUTION INTRAMUSCULAR; INTRAVENOUS at 05:53

## 2020-03-08 RX ADMIN — DILTIAZEM HYDROCHLORIDE SCH: 60 TABLET, FILM COATED ORAL at 07:25

## 2020-03-08 RX ADMIN — DILTIAZEM HYDROCHLORIDE SCH MG: 60 TABLET, FILM COATED ORAL at 21:00

## 2020-03-08 RX ADMIN — LORAZEPAM PRN MG: 2 INJECTION INTRAMUSCULAR; INTRAVENOUS at 03:55

## 2020-03-08 RX ADMIN — IPRATROPIUM BROMIDE AND ALBUTEROL SULFATE PRN ML: 2.5; .5 SOLUTION RESPIRATORY (INHALATION) at 08:12

## 2020-03-08 RX ADMIN — HYDRALAZINE HYDROCHLORIDE PRN MG: 20 INJECTION INTRAMUSCULAR; INTRAVENOUS at 04:04

## 2020-03-08 RX ADMIN — DOXYCYCLINE SCH MLS/HR: 100 INJECTION, POWDER, LYOPHILIZED, FOR SOLUTION INTRAVENOUS at 09:46

## 2020-03-08 RX ADMIN — FUROSEMIDE SCH MG: 10 INJECTION, SOLUTION INTRAMUSCULAR; INTRAVENOUS at 17:29

## 2020-03-08 RX ADMIN — LORAZEPAM PRN MG: 2 INJECTION INTRAMUSCULAR; INTRAVENOUS at 23:35

## 2020-03-08 RX ADMIN — METOPROLOL SUCCINATE SCH: 25 TABLET, EXTENDED RELEASE ORAL at 07:25

## 2020-03-08 RX ADMIN — HALOPERIDOL LACTATE SCH MG: 5 INJECTION, SOLUTION INTRAMUSCULAR at 20:52

## 2020-03-08 RX ADMIN — LORAZEPAM PRN MG: 2 INJECTION INTRAMUSCULAR; INTRAVENOUS at 10:59

## 2020-03-08 RX ADMIN — LORAZEPAM PRN MG: 2 INJECTION INTRAMUSCULAR; INTRAVENOUS at 15:20

## 2020-03-08 RX ADMIN — ENOXAPARIN SODIUM SCH MG: 40 INJECTION SUBCUTANEOUS at 09:47

## 2020-03-08 RX ADMIN — HYDRALAZINE HYDROCHLORIDE PRN MG: 20 INJECTION INTRAMUSCULAR; INTRAVENOUS at 17:30

## 2020-03-08 RX ADMIN — METHYLPREDNISOLONE SODIUM SUCCINATE SCH MG: 40 INJECTION, POWDER, FOR SOLUTION INTRAMUSCULAR; INTRAVENOUS at 21:00

## 2020-03-08 NOTE — PDOC PROGRESS REPORT
Subjective


Progress Note for:: 03/08/20


Reason For Visit: 


CAP,COPD EXACERBATION,ACUTE ON CHRONIC AFIB WITH


Atypical pneumonia, influenza A, COPD exacerbation, acute on chronic atrial fib,

CHF exacerbation, dementia, hypertension, history of Crohn's disease





Physical Exam


Vital Signs: 


                                        











Temp Pulse Resp BP Pulse Ox


 


 97.7 F   92   26 H  155/56 H  98 


 


 03/08/20 08:22  03/08/20 08:22  03/08/20 08:22  03/08/20 08:22  03/08/20 08:22








                                 Intake & Output











 03/07/20 03/08/20 03/09/20





 05:59 06:59 06:59


 


Intake Total   


 


Output Total   


 


Balance   


 


Weight   











General appearance: PRESENT: mild distress, other - Patient resting well with 

the BiPAP in place


Respiratory exam: PRESENT: decreased breath sounds, wheezes - Lungs sound 

clearer today


Cardiovascular exam: PRESENT: RRR.  ABSENT: diastolic murmur, rubs, systolic 

murmur


Neurological exam: PRESENT: altered, other - Patient resting secondary to Ativan


Psychiatric exam: PRESENT: agitated, anxious





Results


Laboratory Results: 


                                        





                                 03/08/20 06:18 





                                 03/08/20 06:18 





                                        











  03/07/20 03/08/20 03/08/20





  16:30 06:18 06:18


 


WBC   7.8 


 


RBC   3.53 L 


 


Hgb   10.7 L 


 


Hct   29.7 L 


 


MCV   84 


 


MCH   30.3 


 


MCHC   36.1 H 


 


RDW   17.2 H 


 


Plt Count   172 


 


Carbonic Acid   


 


HCO3/H2CO3 Ratio   


 


ABG pH   


 


ABG pCO2   


 


ABG pO2   


 


ABG HCO3   


 


ABG O2 Saturation   


 


ABG Base Excess   


 


FiO2   


 


Sodium    135.3 L


 


Potassium    3.9


 


Chloride    98


 


Carbon Dioxide    31 H


 


Anion Gap    6


 


BUN    24 H


 


Creatinine    0.62


 


Est GFR ( Amer)    > 60


 


Glucose    136 H


 


Lactic Acid   


 


Calcium    9.2


 


Total Bilirubin    0.5


 


AST    25


 


Alkaline Phosphatase    67


 


Total Protein    6.1 L


 


Albumin    3.6


 


Urine Color  RED  


 


Urine Appearance  SLIGHTLY-CLOUDY  


 


Urine pH  6.0  


 


Ur Specific Gravity  1.016  


 


Urine Protein  100 H  


 


Urine Glucose (UA)  NEGATIVE  


 


Urine Ketones  NEGATIVE  


 


Urine Blood  LARGE H  


 


Urine Nitrite  NEGATIVE  


 


Ur Leukocyte Esterase  NEGATIVE  


 


Urine RBC (Auto)  >182  














  03/08/20 03/08/20





  06:18 06:20


 


WBC  


 


RBC  


 


Hgb  


 


Hct  


 


MCV  


 


MCH  


 


MCHC  


 


RDW  


 


Plt Count  


 


Carbonic Acid   1.19


 


HCO3/H2CO3 Ratio   23:1


 


ABG pH   7.46 H


 


ABG pCO2   39.4


 


ABG pO2   86.0


 


ABG HCO3   27.5 H


 


ABG O2 Saturation   97.0


 


ABG Base Excess   3.5


 


FiO2   40%


 


Sodium  


 


Potassium  


 


Chloride  


 


Carbon Dioxide  


 


Anion Gap  


 


BUN  


 


Creatinine  


 


Est GFR (African Amer)  


 


Glucose  


 


Lactic Acid  1.0 


 


Calcium  


 


Total Bilirubin  


 


AST  


 


Alkaline Phosphatase  


 


Total Protein  


 


Albumin  


 


Urine Color  


 


Urine Appearance  


 


Urine pH  


 


Ur Specific Gravity  


 


Urine Protein  


 


Urine Glucose (UA)  


 


Urine Ketones  


 


Urine Blood  


 


Urine Nitrite  


 


Ur Leukocyte Esterase  


 


Urine RBC (Auto)  








                                        





03/04/20 22:39   Blood   Blood Culture (PCR) - Final


                            Streptococcus Species


03/04/20 22:39   Blood   Blood Culture - Final


                            Strep Mitis/Oralis Grp





                                        











  03/04/20 03/04/20 03/05/20





  21:51 21:51 00:20


 


Creatine Kinase  128  


 


Troponin I   0.057  0.152


 


NT-Pro-B Natriuret Pep   5170 H 














  03/07/20





  13:37


 


Creatine Kinase 


 


Troponin I 


 


NT-Pro-B Natriuret Pep  5250 H











Impressions: 


                                        





Chest X-Ray  03/07/20 00:00


IMPRESSION:  Improved vascular congestion.


 














Assessment and Plan





- Diagnosis


(1) Influenza A


Is this a current diagnosis for this admission?: Yes   





(2) Atrial fibrillation with RVR


Is this a current diagnosis for this admission?: Yes   





(3) Atypical pneumonia


Is this a current diagnosis for this admission?: Yes   





(4) CHF exacerbation


Qualifiers: 


   Heart failure type: unspecified   Qualified Code(s): I50.9 - Heart failure, 

unspecified   


Is this a current diagnosis for this admission?: Yes   





(5) COPD exacerbation


Is this a current diagnosis for this admission?: Yes   





(6) Crohn's disease


Is this a current diagnosis for this admission?: Yes   





(7) HTN (hypertension)


Is this a current diagnosis for this admission?: Yes   





- Plan Summary


Summary: 


3/6/2020


Temperature 97.9 pulse 76 blood pressure 144/58 O2 sat 94% on 4 L


We will have nursing try to wean him back down to 3 L


Patient has no history of thyroid disease will recheck free T3 and free T4


Medications include Lasix 10 mg IV every 12 hours Solu-Medrol 40 mg IV every 12 

hours Vibramycin 100 mg every 12 hours and Rocephin 2 g daily


Plus other incidental medications


Patient states he is breathing slightly better than on admission.  Patient lives

at home with his daughter and son-in-law


WBCs on admission were 18,000 today 13,000


Chemistry is grossly normal


1 blood culture grew out Streptococcus species


Will consult discharge planning to assess patient's needs at time of discharge





3/7/2020


Patient is afebrile 97 7 pulse is still between 90 and 100 blood pressure is 

elevated 186/57, although earlier this morning it was 144/58


I am also going to increase his Apresoline to 20 mg as needed


Patient is on captopril 50 mg every 12 hours, Cardizem 60 mg every 8 hours, 

Lasix was increased to 20 mg IV every 12 hours, Solu-Medrol was increased to 40 

mg every 8 hours.


Patient was also on Toprol XL 25 mg daily prior to admission and I will resume 

this


Patient's oxygen saturation is between 91 and 97% on 2 L nasal cannula





Patient is on IV Vibramycin as well as Rocephin, sensitivities appear to be 

appropriate for these 2 drugs


Patient is making slow progress


Patient lives at home with family.  Patient states he was not on oxygen prior to

coming in


Chest x-ray does showed pneumonia and patient also was flu A+





3/8/2020


Patient is more agitated when he is awake although you can speak to him and he 

will calm down.  Patient pulled out his Ball catheter yesterday and caused a 

little bit of trauma resulting in hematuria which has cleared on its own.


Patient does much better when on the BiPAP but this does require Ativan


Overall blood pressures are starting to come down


ABGs done early this morning are actually fairly normal


White count on admission was 18,000 and is now now down to 7.8


Lactic acid remains normal





Had a long conversation yesterday with the patient's daughter, Danita, I 

explained to her how sick her father was and that this could be a life-

threatening illness.  Patient told me yesterday that "he would see me in 

[facility]".  


I think we should continue on the same course of treatment since he appears to 

be gradually improving


Portable chest x-ray done yesterday is actually improved from admission











- Time


Time Spent with patient: 25-34 minutes

## 2020-03-09 LAB
ABSOLUTE LYMPHOCYTES# (MANUAL): 0.6 10^3/UL (ref 0.5–4.7)
ABSOLUTE MONOCYTES # (MANUAL): 0.1 10^3/UL (ref 0.1–1.4)
ADD MANUAL DIFF: YES
ALBUMIN SERPL-MCNC: 3.3 G/DL (ref 3.5–5)
ALP SERPL-CCNC: 64 U/L (ref 38–126)
ANION GAP SERPL CALC-SCNC: 6 MMOL/L (ref 5–19)
ANISOCYTOSIS BLD QL SMEAR: (no result)
APPEARANCE UR: (no result)
APTT BLD: 32.5 SEC (ref 23.5–35.8)
APTT PPP: YELLOW S
AST SERPL-CCNC: 28 U/L (ref 17–59)
BASOPHILS NFR BLD MANUAL: 0 % (ref 0–2)
BILIRUB DIRECT SERPL-MCNC: 0.4 MG/DL (ref 0–0.4)
BILIRUB SERPL-MCNC: 0.6 MG/DL (ref 0.2–1.3)
BILIRUB UR QL STRIP: NEGATIVE
BUN SERPL-MCNC: 31 MG/DL (ref 7–20)
CALCIUM: 9.1 MG/DL (ref 8.4–10.2)
CHLORIDE SERPL-SCNC: 96 MMOL/L (ref 98–107)
CO2 SERPL-SCNC: 32 MMOL/L (ref 22–30)
EOSINOPHIL NFR BLD MANUAL: 0 % (ref 0–6)
ERYTHROCYTE [DISTWIDTH] IN BLOOD BY AUTOMATED COUNT: 16.6 % (ref 11.5–14)
GLUCOSE SERPL-MCNC: 124 MG/DL (ref 75–110)
GLUCOSE UR STRIP-MCNC: NEGATIVE MG/DL
HCT VFR BLD CALC: 32.4 % (ref 37.9–51)
HGB BLD-MCNC: 11.3 G/DL (ref 13.5–17)
INR PPP: 1.36
KETONES UR STRIP-MCNC: NEGATIVE MG/DL
MCH RBC QN AUTO: 29.5 PG (ref 27–33.4)
MCHC RBC AUTO-ENTMCNC: 34.8 G/DL (ref 32–36)
MCV RBC AUTO: 85 FL (ref 80–97)
MONOCYTES % (MANUAL): 1 % (ref 3–13)
NITRITE UR QL STRIP: NEGATIVE
PH UR STRIP: 6 [PH] (ref 5–9)
PLATELET # BLD: 188 10^3/UL (ref 150–450)
PLATELET COMMENT: ADEQUATE
PLATELET GIANT: PRESENT
PLATELET LARGE: PRESENT
POTASSIUM SERPL-SCNC: 3.9 MMOL/L (ref 3.6–5)
PROT SERPL-MCNC: 5.9 G/DL (ref 6.3–8.2)
PROT UR STRIP-MCNC: 30 MG/DL
PROTHROMBIN TIME: 16.9 SEC (ref 11.4–15.4)
RBC # BLD AUTO: 3.82 10^6/UL (ref 4.35–5.55)
SEGMENTED NEUTROPHILS % (MAN): 92 % (ref 42–78)
SP GR UR STRIP: 1.01
TOTAL CELLS COUNTED BLD: 100
UROBILINOGEN UR-MCNC: NEGATIVE MG/DL (ref ?–2)
VARIANT LYMPHS NFR BLD MANUAL: 7 % (ref 13–45)
WBC # BLD AUTO: 8.2 10^3/UL (ref 4–10.5)
WBC TOXIC VACUOLES BLD QL SMEAR: PRESENT

## 2020-03-09 RX ADMIN — METHYLPREDNISOLONE SODIUM SUCCINATE SCH MG: 40 INJECTION, POWDER, FOR SOLUTION INTRAMUSCULAR; INTRAVENOUS at 21:17

## 2020-03-09 RX ADMIN — LORAZEPAM PRN MG: 2 INJECTION INTRAMUSCULAR; INTRAVENOUS at 23:53

## 2020-03-09 RX ADMIN — DIGOXIN SCH MG: 0.12 TABLET ORAL at 09:03

## 2020-03-09 RX ADMIN — HALOPERIDOL LACTATE SCH MG: 5 INJECTION, SOLUTION INTRAMUSCULAR at 08:51

## 2020-03-09 RX ADMIN — DILTIAZEM HYDROCHLORIDE SCH MG: 60 TABLET, FILM COATED ORAL at 06:03

## 2020-03-09 RX ADMIN — DOXYCYCLINE SCH MLS/HR: 100 INJECTION, POWDER, LYOPHILIZED, FOR SOLUTION INTRAVENOUS at 22:40

## 2020-03-09 RX ADMIN — HYDRALAZINE HYDROCHLORIDE PRN MG: 20 INJECTION INTRAMUSCULAR; INTRAVENOUS at 16:28

## 2020-03-09 RX ADMIN — FUROSEMIDE SCH MG: 10 INJECTION, SOLUTION INTRAMUSCULAR; INTRAVENOUS at 06:03

## 2020-03-09 RX ADMIN — IPRATROPIUM BROMIDE AND ALBUTEROL SULFATE PRN ML: 2.5; .5 SOLUTION RESPIRATORY (INHALATION) at 08:07

## 2020-03-09 RX ADMIN — METOPROLOL SUCCINATE SCH MG: 25 TABLET, EXTENDED RELEASE ORAL at 08:51

## 2020-03-09 RX ADMIN — CEFTRIAXONE SCH MLS/HR: 2 INJECTION, SOLUTION INTRAVENOUS at 21:17

## 2020-03-09 RX ADMIN — LORAZEPAM PRN MG: 2 INJECTION INTRAMUSCULAR; INTRAVENOUS at 10:00

## 2020-03-09 RX ADMIN — METHYLPREDNISOLONE SODIUM SUCCINATE SCH MG: 40 INJECTION, POWDER, FOR SOLUTION INTRAMUSCULAR; INTRAVENOUS at 06:02

## 2020-03-09 RX ADMIN — DILTIAZEM HYDROCHLORIDE SCH MG: 60 TABLET, FILM COATED ORAL at 13:29

## 2020-03-09 RX ADMIN — METHYLPREDNISOLONE SODIUM SUCCINATE SCH MG: 40 INJECTION, POWDER, FOR SOLUTION INTRAMUSCULAR; INTRAVENOUS at 13:28

## 2020-03-09 RX ADMIN — FUROSEMIDE SCH MG: 10 INJECTION, SOLUTION INTRAMUSCULAR; INTRAVENOUS at 18:44

## 2020-03-09 RX ADMIN — HALOPERIDOL LACTATE SCH MG: 5 INJECTION, SOLUTION INTRAMUSCULAR at 02:46

## 2020-03-09 RX ADMIN — DOXYCYCLINE SCH MLS/HR: 100 INJECTION, POWDER, LYOPHILIZED, FOR SOLUTION INTRAVENOUS at 09:51

## 2020-03-09 RX ADMIN — ENOXAPARIN SODIUM SCH MG: 40 INJECTION SUBCUTANEOUS at 09:03

## 2020-03-09 RX ADMIN — HALOPERIDOL LACTATE SCH MG: 5 INJECTION, SOLUTION INTRAMUSCULAR at 14:18

## 2020-03-09 RX ADMIN — DILTIAZEM HYDROCHLORIDE SCH MG: 60 TABLET, FILM COATED ORAL at 21:17

## 2020-03-09 RX ADMIN — HALOPERIDOL LACTATE SCH MG: 5 INJECTION, SOLUTION INTRAMUSCULAR at 21:18

## 2020-03-09 NOTE — PDOC PROGRESS REPORT
Subjective


Progress Note for:: 03/09/20


Reason For Visit: 


CAP,COPD EXACERBATION,ACUTE ON CHRONIC AFIB WITH


3/9/2020


Patient was admitted with COPD exacerbation, possible community-acquired 

pneumonia, flu a positive, acute on chronic atrial fib with RVR





Physical Exam


Vital Signs: 


                                        











Temp Pulse Resp BP Pulse Ox


 


 97.4 F   82   26 H  167/77 H  94 


 


 03/09/20 07:33  03/09/20 08:07  03/09/20 11:50  03/09/20 07:33  03/09/20 11:50








                                 Intake & Output











 03/08/20 03/09/20 03/10/20





 06:59 06:59 06:59


 


Intake Total  1115 250


 


Output Total  1100 


 


Balance  15 250


 


Weight  68.2 kg 











General appearance: PRESENT: mild distress, other - Due to confusion and 

respiratory distress


Respiratory exam: PRESENT: decreased breath sounds, rhonchi, wheezes - Patient 

really does not sound as bad as he looks, other


Cardiovascular exam: PRESENT: RRR.  ABSENT: diastolic murmur, rubs, systolic 

murmur


Neurological exam: PRESENT: altered, awake, other - confFused at times but you 

can talk to him and he will answer questions appropriately


Psychiatric exam: PRESENT: unusual affect - Confusion possible hallucinations





Results


Laboratory Results: 


                                        





                                 03/08/20 06:18 





                                 03/08/20 06:18 





                                        











  03/09/20





  08:25


 


Urine Color  YELLOW


 


Urine Appearance  SLIGHTLY-CLOUDY


 


Urine pH  6.0


 


Ur Specific Gravity  1.013


 


Urine Protein  30 H


 


Urine Glucose (UA)  NEGATIVE


 


Urine Ketones  NEGATIVE


 


Urine Blood  LARGE H


 


Urine Nitrite  NEGATIVE


 


Ur Leukocyte Esterase  NEGATIVE


 


Urine WBC (Auto)  5


 


Urine RBC (Auto)  >182








                                        











  03/04/20 03/04/20 03/05/20





  21:51 21:51 00:20


 


Creatine Kinase  128  


 


Troponin I   0.057  0.152


 


NT-Pro-B Natriuret Pep   5170 H 














  03/07/20





  13:37


 


Creatine Kinase 


 


Troponin I 


 


NT-Pro-B Natriuret Pep  5250 H











Impressions: 


                                        





Chest X-Ray  03/07/20 00:00


IMPRESSION:  Improved vascular congestion.


 














Assessment and Plan





- Diagnosis


(1) Influenza A


Is this a current diagnosis for this admission?: Yes   





(2) Atrial fibrillation with RVR


Is this a current diagnosis for this admission?: Yes   





(3) Atypical pneumonia


Is this a current diagnosis for this admission?: Yes   





(4) CHF exacerbation


Qualifiers: 


   Heart failure type: unspecified   Qualified Code(s): I50.9 - Heart failure, 

unspecified   


Is this a current diagnosis for this admission?: Yes   





(5) COPD exacerbation


Is this a current diagnosis for this admission?: Yes   





(6) Crohn's disease


Is this a current diagnosis for this admission?: Yes   





(7) HTN (hypertension)


Is this a current diagnosis for this admission?: Yes   





- Plan Summary


Summary: 


3/6/2020


Temperature 97.9 pulse 76 blood pressure 144/58 O2 sat 94% on 4 L


We will have nursing try to wean him back down to 3 L


Patient has no history of thyroid disease will recheck free T3 and free T4


Medications include Lasix 10 mg IV every 12 hours Solu-Medrol 40 mg IV every 12 

hours Vibramycin 100 mg every 12 hours and Rocephin 2 g daily


Plus other incidental medications


Patient states he is breathing slightly better than on admission.  Patient lives

at home with his daughter and son-in-law


WBCs on admission were 18,000 today 13,000


Chemistry is grossly normal


1 blood culture grew out Streptococcus species


Will consult discharge planning to assess patient's needs at time of discharge





3/7/2020


Patient is afebrile 97 7 pulse is still between 90 and 100 blood pressure is 

elevated 186/57, although earlier this morning it was 144/58


I am also going to increase his Apresoline to 20 mg as needed


Patient is on captopril 50 mg every 12 hours, Cardizem 60 mg every 8 hours, 

Lasix was increased to 20 mg IV every 12 hours, Solu-Medrol was increased to 40 

mg every 8 hours.


Patient was also on Toprol XL 25 mg daily prior to admission and I will resume 

this


Patient's oxygen saturation is between 91 and 97% on 2 L nasal cannula





Patient is on IV Vibramycin as well as Rocephin, sensitivities appear to be 

appropriate for these 2 drugs


Patient is making slow progress


Patient lives at home with family.  Patient states he was not on oxygen prior to

coming in


Chest x-ray does showed pneumonia and patient also was flu A+





3/8/2020


Patient is more agitated when he is awake although you can speak to him and he 

will calm down.  Patient pulled out his Ball catheter yesterday and caused a 

little bit of trauma resulting in hematuria which has cleared on its own.


Patient does much better when on the BiPAP but this does require Ativan


Overall blood pressures are starting to come down


ABGs done early this morning are actually fairly normal


White count on admission was 18,000 and is now now down to 7.8


Lactic acid remains normal





Had a long conversation yesterday with the patient's daughter, Danita, I 

explained to her how sick her father was and that this could be a 

life-threatening illness.  Patient told me yesterday that "[facility]  


I think we should continue on the same course of treatment since he appears to 

be gradually improving


Portable chest x-ray done yesterday is actually improved from admission





3/9/2020


This morning his temp is 97 6 pulse is anywhere from  blood pressure 

148/57 respiratory respiratory rates 25-37 on BiPAP he is between 90 and 98% 

saturation


Patient had a blood gas yesterday which was not all that terribly abnormal


Chemistry panel appears stable


1 blood culture cultures appears to be growing out a strep mitis , patient 

currently on IV doxycycline and IV Rocephin





Patient is requiring IV Haldol to keep him in the bed.  This morning I talked to

respiratory therapy about trying to switch him over to nasal cannula as he 

appears to be more anxious and agitated with the mask on than without it.  We 

will see how that goes





Patient's mental baseline was probably of some mild dementia certainly not is 

confused and altered as he is now.  I think patient's pulmonary status prior to 

admission was also marginal.





Patient's chest x-ray from 2 days ago really still shows just a cardiomegaly 

which is impressive but no acute pulmonary problems.


I will consult cardiology today to see if they have any insight




















- Time


Time Spent with patient: 25-34 minutes

## 2020-03-10 RX ADMIN — HALOPERIDOL LACTATE SCH MG: 5 INJECTION, SOLUTION INTRAMUSCULAR at 02:50

## 2020-03-10 RX ADMIN — CEFTRIAXONE SCH MLS/HR: 2 INJECTION, SOLUTION INTRAVENOUS at 21:44

## 2020-03-10 RX ADMIN — LORAZEPAM PRN MG: 2 INJECTION INTRAMUSCULAR; INTRAVENOUS at 06:00

## 2020-03-10 RX ADMIN — DILTIAZEM HYDROCHLORIDE SCH MG: 60 TABLET, FILM COATED ORAL at 06:00

## 2020-03-10 RX ADMIN — METHYLPREDNISOLONE SODIUM SUCCINATE SCH MG: 40 INJECTION, POWDER, FOR SOLUTION INTRAMUSCULAR; INTRAVENOUS at 21:44

## 2020-03-10 RX ADMIN — OSELTAMIVIR PHOSPHATE SCH MG: 75 CAPSULE ORAL at 23:21

## 2020-03-10 RX ADMIN — HALOPERIDOL LACTATE SCH MG: 5 INJECTION, SOLUTION INTRAMUSCULAR at 21:44

## 2020-03-10 RX ADMIN — FUROSEMIDE SCH MG: 10 INJECTION, SOLUTION INTRAMUSCULAR; INTRAVENOUS at 18:30

## 2020-03-10 RX ADMIN — ENOXAPARIN SODIUM SCH MG: 40 INJECTION SUBCUTANEOUS at 09:59

## 2020-03-10 RX ADMIN — FUROSEMIDE SCH MG: 10 INJECTION, SOLUTION INTRAMUSCULAR; INTRAVENOUS at 06:00

## 2020-03-10 RX ADMIN — HALOPERIDOL LACTATE SCH MG: 5 INJECTION, SOLUTION INTRAMUSCULAR at 09:59

## 2020-03-10 RX ADMIN — DOXYCYCLINE SCH MLS/HR: 100 INJECTION, POWDER, LYOPHILIZED, FOR SOLUTION INTRAVENOUS at 10:03

## 2020-03-10 RX ADMIN — OSELTAMIVIR PHOSPHATE SCH MG: 75 CAPSULE ORAL at 13:43

## 2020-03-10 RX ADMIN — METHYLPREDNISOLONE SODIUM SUCCINATE SCH MG: 40 INJECTION, POWDER, FOR SOLUTION INTRAMUSCULAR; INTRAVENOUS at 06:00

## 2020-03-10 RX ADMIN — METHYLPREDNISOLONE SODIUM SUCCINATE SCH MG: 40 INJECTION, POWDER, FOR SOLUTION INTRAMUSCULAR; INTRAVENOUS at 13:58

## 2020-03-10 RX ADMIN — DOXYCYCLINE SCH MLS/HR: 100 INJECTION, POWDER, LYOPHILIZED, FOR SOLUTION INTRAVENOUS at 23:21

## 2020-03-10 RX ADMIN — HALOPERIDOL LACTATE SCH MG: 5 INJECTION, SOLUTION INTRAMUSCULAR at 18:30

## 2020-03-10 NOTE — PDOC PROGRESS REPORT
Subjective


Progress Note for:: 03/10/20


Subjective:: 





 86 year old male with past medical history significant for chronic A. fib, HTN,

HLD, COPD, Crohn's disease who presented to ED with severe respiratory distress 

that awoke him from sleep.  Patient contracted a viral upper respiratory illness

proximately 3 days prior to admission and this became worse gradually causing 

productive cough shortness of breath/ROBERTSON.  Patient does not use home oxygen 

prior to admission.  He was requiring BiPAP to maintain saturations on 

admission.  Given steroids, nebulizers, antibiotics on admission.  Chest x-ray 

did not specifically show pneumonia the lungs had scattered congestion on exam. 

Patient noted to be in A. fib with RVR on admission with rates in the 150s and 

is promptly came down with diltiazem drip with a rate of 10mg/h.  Daughter is in

charge of patient's medications and states he has not missed any doses including

his digoxin and diltiazem. 








3/10/20 86-year-old male with history of A. fib, hypertension, COPD, Crohn's 

disease admitted with severe respiratory distress.  He is still on BiPAP this 

morning.  Admission diagnosis is a COPD exacerbation.  He came in with A. fib 

with RVR and initially on Cardizem drip now is on diltiazem, metoprolol, 

digoxin.  Heart rate is dropped to 30s.  To hold digoxin Cardizem metoprolol for

now.  And if she goes back to A. fib with RVR plan is to consult cardiology.


Reason For Visit: 


CAP,COPD EXACERBATION,ACUTE ON CHRONIC AFIB WITH








Physical Exam


Vital Signs: 


                                        











Temp Pulse Resp BP Pulse Ox


 


 97.1 F   66   24 H  146/77 H  96 


 


 03/10/20 08:41  03/10/20 08:41  03/10/20 08:41  03/10/20 08:41  03/10/20 08:41








                                 Intake & Output











 03/09/20 03/10/20 03/11/20





 06:59 06:59 06:59


 


Intake Total 1115 700 


 


Output Total 1100 1300 


 


Balance 15 -600 


 


Weight 68.2 kg 69.3 kg 











General appearance: PRESENT: other - On BiPAP.  Not communicative.


Head exam: PRESENT: atraumatic


Eye exam: PRESENT: PERRLA


Ear exam: PRESENT: normal external ear exam


Mouth exam: PRESENT: moist, tongue midline


Teeth exam: PRESENT: poor dentation


Neck exam: ABSENT: carotid bruit, JVD, lymphadenopathy, thyromegaly


Respiratory exam: PRESENT: decreased breath sounds


Cardiovascular exam: PRESENT: bradycardia


Pulses: PRESENT: normal dorsalis pedis pul


GI/Abdominal exam: PRESENT: normal bowel sounds, soft.  ABSENT: distended, 

guarding, mass, organolmegaly, rebound, tenderness


Rectal exam: PRESENT: deferred


Gentrourinary exam: PRESENT: indwelling catheter


Neurological exam: PRESENT: other - Patient is on BiPAP.  Not communicative.  

Resting comfortably.  Patient is receiving Haldol patient is not fully awake for

neurological examination.





Results


Laboratory Results: 


                                        





                                 03/09/20 14:32 





                                 03/09/20 14:32 





                                        











  03/09/20 03/09/20





  14:32 14:32


 


WBC  8.2 


 


RBC  3.82 L 


 


Hgb  11.3 L 


 


Hct  32.4 L 


 


MCV  85 


 


MCH  29.5 


 


MCHC  34.8 


 


RDW  16.6 H 


 


Plt Count  188 


 


Seg Neutrophils %  Not Reportable 


 


Sodium   133.7 L


 


Potassium   3.9


 


Chloride   96 L


 


Carbon Dioxide   32 H


 


Anion Gap   6


 


BUN   31 H


 


Creatinine   0.56


 


Est GFR (African Amer)   > 60


 


Glucose   124 H


 


Calcium   9.1


 


Total Bilirubin   0.6


 


AST   28


 


Alkaline Phosphatase   64


 


Total Protein   5.9 L


 


Albumin   3.3 L








                                        





03/04/20 21:51   Blood   Blood Culture - Final


                            NO GROWTH IN 5 DAYS





                                        











  03/04/20 03/04/20 03/05/20





  21:51 21:51 00:20


 


Creatine Kinase  128  


 


Troponin I   0.057  0.152


 


NT-Pro-B Natriuret Pep   5170 H 














  03/07/20





  13:37


 


Creatine Kinase 


 


Troponin I 


 


NT-Pro-B Natriuret Pep  5250 H











Impressions: 


                                        





Chest X-Ray  03/07/20 00:00


IMPRESSION:  Improved vascular congestion.


 














Assessment and Plan





- Diagnosis


(1) Atypical pneumonia


Is this a current diagnosis for this admission?: Yes   


Plan: 





Suspect atypical pneumonia, possibly gram-negative, likely bacterial infection 

followed viral illness


Ceftriaxone/doxycycline (azithromycin cannot be used due to very long QTC on 

admission)


Respiratory culture


Oxygen as needed with goal 89 to 94%


Bronchial hygiene





3/10/2020-patient admitted with atypical pneumonia.  Presently on ceftriaxone, 

doxycycline.  Plan BiPAP.  This chest x-ray suggestive of improved vascular 

congestion.  Cultures are negative.  Most likely patient has community-acquired 

pneumonia.








(2) COPD exacerbation


Is this a current diagnosis for this admission?: Yes   


Plan: 





Antibiotics


Nebulizer treatments


Steroids, transition to oral when able





3/10/2020-patient admitted with COPD exacerbation.  Presently receiving IV 

antibiotics, nebulizer treatments and methylprednisolone 40 mg IV every 8 hours.

 Plan is to continue to keep him on BiPAP.








(3) CHF exacerbation


Qualifiers: 


   Heart failure type: unspecified   Qualified Code(s): I50.9 - Heart failure, 

unspecified   


Is this a current diagnosis for this admission?: Yes   


Plan: 





BNP elevated to over 5000; lungs slightly wet on exam


Likely due to A. fib, expect improvement with rate control


Continue home cardiac medications


Avoid IV fluids


Low-dose IV Lasix then reassess volume status and hemodynamics; may not need 

this long-term








3/10/2020-latest chest x-ray indicated above improving vascular congestion.  BNP

is elevated at the time of admission.  Patient is on Lasix at this time.  Blood 

pressure is 132/60 stable.  Request for echocardiogram.








(4) Atrial fibrillation with RVR


Is this a current diagnosis for this admission?: Yes   


Plan: 





Acute on chronic


Likely due to respiratory failure and hypoxia


Diltiazem IV, transition back to oral diltiazem when able


Digoxin restarted








3/10/2020-patient admitted with paroxysmal A. fib with RVR.  Initially on 

diltiazem drip and now on p.o. digoxin, p.o. metoprolol, p.o. Cardizem.  Heart 

rate is going to 30s.  Plan is to hold digoxin, Nexium, metoprolol.  If the 

patient goes back into AF with RVR plan is to start on diltiazem drip and 

consult cardiology.  To request for echocardiogram.








(5) HTN (hypertension)


Is this a current diagnosis for this admission?: Yes   


Plan: 





Home medications selectively restarted





3/10/2020-blood pressure today is 140/60.  Stable.








(6) Crohn's disease


Is this a current diagnosis for this admission?: No   


Plan: 





Takes Pentasa at home, resume this after confirming dose with pharmacy, 

daughter did not recall dose on admission; suspect it will be 1 g taken 4 times 

daily








(7) Dementia


Is this a current diagnosis for this admission?: No   


Plan: 





Progressive memory loss and cognitive impairment per daughter, never officially

diagnosed with dementia per her


Suspect Alzheimer's








(8) Bradycardia


Is this a current diagnosis for this admission?: Yes   


Plan: 


3/10/2020-patient heart rate is in the 30s.  It may be secondary to medications.

 Patient is on digoxin, Cardizem, metoprolol.  Those medications will be on 

hold.  Echocardiogram is requested.








- Plan Summary


Summary: 


3/6/2020


Temperature 97.9 pulse 76 blood pressure 144/58 O2 sat 94% on 4 L


We will have nursing try to wean him back down to 3 L


Patient has no history of thyroid disease will recheck free T3 and free T4


Medications include Lasix 10 mg IV every 12 hours Solu-Medrol 40 mg IV every 12 

hours Vibramycin 100 mg every 12 hours and Rocephin 2 g daily


Plus other incidental medications


Patient states he is breathing slightly better than on admission.  Patient lives

at home with his daughter and son-in-law


WBCs on admission were 18,000 today 13,000


Chemistry is grossly normal


1 blood culture grew out Streptococcus species


Will consult discharge planning to assess patient's needs at time of discharge





3/7/2020


Patient is afebrile 97 7 pulse is still between 90 and 100 blood pressure is 

elevated 186/57, although earlier this morning it was 144/58


I am also going to increase his Apresoline to 20 mg as needed


Patient is on captopril 50 mg every 12 hours, Cardizem 60 mg every 8 hours, 

Lasix was increased to 20 mg IV every 12 hours, Solu-Medrol was increased to 40 

mg every 8 hours.


Patient was also on Toprol XL 25 mg daily prior to admission and I will resume 

this


Patient's oxygen saturation is between 91 and 97% on 2 L nasal cannula





Patient is on IV Vibramycin as well as Rocephin, sensitivities appear to be 

appropriate for these 2 drugs


Patient is making slow progress


Patient lives at home with family.  Patient states he was not on oxygen prior to

coming in


Chest x-ray does showed pneumonia and patient also was flu A+





3/8/2020


Patient is more agitated when he is awake although you can speak to him and he 

will calm down.  Patient pulled out his Ball catheter yesterday and caused a 

little bit of trauma resulting in hematuria which has cleared on its own.


Patient does much better when on the BiPAP but this does require Ativan


Overall blood pressures are starting to come down


ABGs done early this morning are actually fairly normal


White count on admission was 18,000 and is now now down to 7.8


Lactic acid remains normal





Had a long conversation yesterday with the patient's daughter, Danita, I 

explained to her how sick her father was and that this could be a life-threa

tening illness.  Patient told me yesterday that "he would see me in [facility]".  


I think we should continue on the same course of treatment since he appears to 

be gradually improving


Portable chest x-ray done yesterday is actually improved from admission





3/9/2020


This morning his temp is 97 6 pulse is anywhere from  blood pressure 

148/57 respiratory respiratory rates 25-37 on BiPAP he is between 90 and 98% 

saturation


Patient had a blood gas yesterday which was not all that terribly abnormal


Chemistry panel appears stable


1 blood culture cultures appears to be growing out a strep mitis , patient 

currently on IV doxycycline and IV Rocephin





Patient is requiring IV Haldol to keep him in the bed.  This morning I talked to

respiratory therapy about trying to switch him over to nasal cannula as he 

appears to be more anxious and agitated with the mask on than without it.  We 

will see how that goes





Patient's mental baseline was probably of some mild dementia certainly not is 

confused and altered as he is now.  I think patient's pulmonary status prior to 

admission was also marginal.





Patient's chest x-ray from 2 days ago really still shows just a cardiomegaly 

which is impressive but no acute pulmonary problems.


I will consult cardiology today to see if they have any insight

## 2020-03-11 LAB
ABSOLUTE LYMPHOCYTES# (MANUAL): 0.2 10^3/UL (ref 0.5–4.7)
ABSOLUTE MONOCYTES # (MANUAL): 0.3 10^3/UL (ref 0.1–1.4)
ADD MANUAL DIFF: YES
ALBUMIN SERPL-MCNC: 2.7 G/DL (ref 3.5–5)
ALP SERPL-CCNC: 57 U/L (ref 38–126)
ANION GAP SERPL CALC-SCNC: 6 MMOL/L (ref 5–19)
ANISOCYTOSIS BLD QL SMEAR: (no result)
AST SERPL-CCNC: 25 U/L (ref 17–59)
BASOPHILS NFR BLD MANUAL: 0 % (ref 0–2)
BILIRUB DIRECT SERPL-MCNC: 0.3 MG/DL (ref 0–0.4)
BILIRUB SERPL-MCNC: 0.5 MG/DL (ref 0.2–1.3)
BUN SERPL-MCNC: 35 MG/DL (ref 7–20)
CALCIUM: 8.7 MG/DL (ref 8.4–10.2)
CHLORIDE SERPL-SCNC: 96 MMOL/L (ref 98–107)
CO2 SERPL-SCNC: 33 MMOL/L (ref 22–30)
EOSINOPHIL NFR BLD MANUAL: 0 % (ref 0–6)
ERYTHROCYTE [DISTWIDTH] IN BLOOD BY AUTOMATED COUNT: 16.1 % (ref 11.5–14)
GLUCOSE SERPL-MCNC: 124 MG/DL (ref 75–110)
HCT VFR BLD CALC: 31.3 % (ref 37.9–51)
HGB BLD-MCNC: 11.2 G/DL (ref 13.5–17)
INR PPP: 1.34
MCH RBC QN AUTO: 29.8 PG (ref 27–33.4)
MCHC RBC AUTO-ENTMCNC: 35.8 G/DL (ref 32–36)
MCV RBC AUTO: 83 FL (ref 80–97)
MONOCYTES % (MANUAL): 7 % (ref 3–13)
NEUTS BAND NFR BLD MANUAL: 1 % (ref 3–5)
PLATELET # BLD: 158 10^3/UL (ref 150–450)
PLATELET CLUMP BLD QL SMEAR: PRESENT
PLATELET COMMENT: ADEQUATE
POTASSIUM SERPL-SCNC: 3.8 MMOL/L (ref 3.6–5)
PROT SERPL-MCNC: 5.1 G/DL (ref 6.3–8.2)
PROTHROMBIN TIME: 16.6 SEC (ref 11.4–15.4)
RBC # BLD AUTO: 3.76 10^6/UL (ref 4.35–5.55)
SEGMENTED NEUTROPHILS % (MAN): 87 % (ref 42–78)
TOTAL CELLS COUNTED BLD: 100
VARIANT LYMPHS NFR BLD MANUAL: 5 % (ref 13–45)
WBC # BLD AUTO: 3.9 10^3/UL (ref 4–10.5)

## 2020-03-11 RX ADMIN — METOPROLOL SUCCINATE SCH MG: 25 TABLET, EXTENDED RELEASE ORAL at 21:44

## 2020-03-11 RX ADMIN — OSELTAMIVIR PHOSPHATE SCH MG: 75 CAPSULE ORAL at 21:44

## 2020-03-11 RX ADMIN — CAPTOPRIL SCH MG: 25 TABLET ORAL at 21:45

## 2020-03-11 RX ADMIN — FUROSEMIDE SCH MG: 10 INJECTION, SOLUTION INTRAMUSCULAR; INTRAVENOUS at 05:32

## 2020-03-11 RX ADMIN — ENOXAPARIN SODIUM SCH MG: 40 INJECTION SUBCUTANEOUS at 09:44

## 2020-03-11 RX ADMIN — DOXYCYCLINE SCH MLS/HR: 100 INJECTION, POWDER, LYOPHILIZED, FOR SOLUTION INTRAVENOUS at 09:45

## 2020-03-11 RX ADMIN — HYDRALAZINE HYDROCHLORIDE PRN MG: 20 INJECTION INTRAMUSCULAR; INTRAVENOUS at 16:28

## 2020-03-11 RX ADMIN — HALOPERIDOL LACTATE SCH: 5 INJECTION, SOLUTION INTRAMUSCULAR at 15:17

## 2020-03-11 RX ADMIN — OSELTAMIVIR PHOSPHATE SCH MG: 75 CAPSULE ORAL at 09:45

## 2020-03-11 RX ADMIN — METHYLPREDNISOLONE SODIUM SUCCINATE SCH MG: 40 INJECTION, POWDER, FOR SOLUTION INTRAMUSCULAR; INTRAVENOUS at 05:32

## 2020-03-11 RX ADMIN — HALOPERIDOL LACTATE SCH MG: 5 INJECTION, SOLUTION INTRAMUSCULAR at 02:25

## 2020-03-11 RX ADMIN — CAPTOPRIL SCH: 25 TABLET ORAL at 09:45

## 2020-03-11 RX ADMIN — HALOPERIDOL LACTATE SCH MG: 5 INJECTION, SOLUTION INTRAMUSCULAR at 22:53

## 2020-03-11 RX ADMIN — HYDRALAZINE HYDROCHLORIDE PRN MG: 20 INJECTION INTRAMUSCULAR; INTRAVENOUS at 01:26

## 2020-03-11 RX ADMIN — DOXYCYCLINE SCH MLS/HR: 100 INJECTION, POWDER, LYOPHILIZED, FOR SOLUTION INTRAVENOUS at 22:52

## 2020-03-11 RX ADMIN — FUROSEMIDE SCH MG: 10 INJECTION, SOLUTION INTRAMUSCULAR; INTRAVENOUS at 17:45

## 2020-03-11 RX ADMIN — CEFTRIAXONE SCH MLS/HR: 2 INJECTION, SOLUTION INTRAVENOUS at 21:44

## 2020-03-11 RX ADMIN — HALOPERIDOL LACTATE SCH MG: 5 INJECTION, SOLUTION INTRAMUSCULAR at 09:44

## 2020-03-11 RX ADMIN — METHYLPREDNISOLONE SODIUM SUCCINATE SCH MG: 40 INJECTION, POWDER, FOR SOLUTION INTRAMUSCULAR; INTRAVENOUS at 21:44

## 2020-03-11 RX ADMIN — METOPROLOL SUCCINATE SCH: 25 TABLET, EXTENDED RELEASE ORAL at 01:20

## 2020-03-11 NOTE — XCELERA REPORT
[address] [address]

                               Tel: [phone]

                               Fax: [phone]



                      Transthoracic Echocardiogram Report

_______________________________________________________________________________



Name: LUIS F VELA

MRN: [id number]                           Age: 86 yrs

Gender: Male                              : 1933

Patient Status: Inpatient                 Patient Location: [address] [place], [place] [address]

Account #: [id number]

Study Date: 03/10/2020 11:38 AM

History: CHF

Accession #: [id number]

_______________________________________________________________________________



Height: 67 in        Weight: 152 lb        BSA: 1.8 m2

_______________________________________________________________________________

Procedure: A complete two-dimensional transthoracic echocardiogram was

performed (2D, M-mode, spectral and color flow Doppler). The study was

technically difficult with many images being suboptimal in quality.

Reason For Study: chf

Previous Evaluation: No previous studies were available.

History: Shortness of breath. CHF.



Ordering Physician: BRIDGER SIM

Performed By: Radha Lassiter

_______________________________________________________________________________



Interpretation Summary

Left ventricular systolic function is normal.

The Ejection Fraction estimate is 55-60%

The right ventricular systolic function is mildly reduced.

There is a mild amount of aortic regurgitation

There is no aortic valve stenosis

There is a mild to moderate amount of mitral regurgitation

There is a moderate amount of tricuspid regurgitation

There is moderate to severe pulmonary hypertension by echo

Minimal pericardial effusion.



MMode/2D Measurements & Calculations



RVDd: 3.1 cm        LVIDd: 4.9 cm     FS: 37.4 %         Ao root diam: 3.7 cm

IVSd: 1.3 cm        LVIDs: 3.0 cm     EDV(Teich):        Ao root area:

                    LVPWd: 1.0 cm     110.3 ml           10.6 cm2

                                      ESV(Teich):        LA dimension: 5.0 cm

                                      36.0 ml

                                      EF(Teich): 67.3 %

        _______________________________________________________________

LVLd ap4: 8.0 cm    SV(MOD-sp4):

EDV(MOD-sp4):       46.0 ml

85.0 ml

LVLs ap4: 6.5 cm

ESV(MOD-sp4):

39.0 ml

EF(MOD-sp4): 54.1 %



Doppler Measurements & Calculations



MV E max aaron:      MV P1/2t max aaron:    Ao V2 max:        AI max aaron:

101.3 cm/sec       129.1 cm/sec         131.5 cm/sec      473.1 cm/sec

MV A max aaron:      MV P1/2t: 58.4 msec  Ao max PG:        AI max P.4 cm/sec        MVA(P1/2t): 3.8 cm2  6.9 mmHg          89.6 mmHg

MV E/A: 1.2        MV dec slope:                          AI dec slope:

                   647.9 cm/sec2                          305.6 cm/sec2

                   MV dec time: 0.20 sec                  AI P1/2t:

                                                          453.5 msec

        _______________________________________________________________



LV V1 max PG:      PI end-d aaron:        TR max aaron:       AV P1/2t-pr_phl:

4.0 mmHg           136.8 cm/sec         371.4 cm/sec      469.8 msec

LV V1 max:                              TR max P.2 cm/sec                            55.2 mmHg

        _______________________________________________________________

MV P1/2t-pr_phl:

58.4 msec



Left Ventricle

The left ventricle is borderline dilated. There is mild to moderate concentric

left ventricular hypertrophy. Left ventricular systolic function is normal.

The Ejection Fraction estimate is 55-60%. No regional wall motion

abnormalities noted.





Right Ventricle

The right ventricle is normal size. The right ventricular systolic function is

mildly reduced.



Atria

The right atrium is severely dilated. The left atrium is moderately dilated.



Mitral Valve

The mitral valve is grossly normal. There is a mild to moderate amount of

mitral regurgitation.



Aortic Valve

The aortic valve is not well visualized secondary to technical limitations.

The aortic valve is moderately calcified. The aortic valve is sclerotic and

shows some degree of functional abnormality. There is no aortic valve

stenosis. There is a mild amount of aortic regurgitation.



Tricuspid Valve

The tricuspid valve is normal in structure but shows some degree of being

functionally abnormal. The tricuspid valve is not well visualized, but is

grossly normal. There is a moderate amount of tricuspid regurgitation. Right

ventricular systolic pressure is estimated to be elevated at >60mmHg. There is

moderate to severe pulmonary hypertension by echo.





Pulmonic Valve

The pulmonic valve is not well seen, but is grossly normal. There is a mild

amount of pulmonic regurgitation.



Great Vessels

The aortic root is normal size.



Effusions

Minimal pericardial effusion.





_______________________________________________________________________________

_______________________________________________________________________________

Electronically signed by:      Reji Corona      on 2020 03:34 PM



CC: BRIDGER SIM Anil

## 2020-03-11 NOTE — PDOC PROGRESS REPORT
Subjective


Progress Note for:: 03/11/20


Subjective:: 





 86 year old male with past medical history significant for chronic A. fib, HTN,

HLD, COPD, Crohn's disease who presented to ED with severe respiratory distress 

that awoke him from sleep.  Patient contracted a viral upper respiratory illness

proximately 3 days prior to admission and this became worse gradually causing 

productive cough shortness of breath/ROBERTSON.  Patient does not use home oxygen 

prior to admission.  He was requiring BiPAP to maintain saturations on 

admission.  Given steroids, nebulizers, antibiotics on admission.  Chest x-ray 

did not specifically show pneumonia the lungs had scattered congestion on exam. 

Patient noted to be in A. fib with RVR on admission with rates in the 150s and 

is promptly came down with diltiazem drip with a rate of 10mg/h.  Daughter is in

charge of patient's medications and states he has not missed any doses including

his digoxin and diltiazem. 








3/10/20 86-year-old male with history of A. fib, hypertension, COPD, Crohn's 

disease admitted with severe respiratory distress.  He is still on BiPAP this 

morning.  Admission diagnosis is a COPD exacerbation.  He came in with A. fib 

with RVR and initially on Cardizem drip now is on diltiazem, metoprolol, 

digoxin.  Heart rate is dropped to 30s.  To hold digoxin Cardizem metoprolol for

now.  And if she goes back to A. fib with RVR plan is to consult cardiology.





Lab 89-7-erke-old male with history of atrial fibrillation, hypertension, COPD 

,Crohn's disease admitted with severe respiratory distress.  Patient is off the 

BiPAP this morning and communicating reasonably.  Not in distress.  He has a 

problem with swallowing and speech consult was requested.  We are going to 

arrange for a barium swallow tomorrow.


Reason For Visit: 


CAP,COPD EXACERBATION,ACUTE ON CHRONIC AFIB WITH








Physical Exam


Vital Signs: 


                                        











Temp Pulse Resp BP Pulse Ox


 


 97.4 F   91   24 H  139/80 H  94 


 


 03/11/20 07:46  03/11/20 07:46  03/11/20 07:46  03/11/20 07:46  03/11/20 07:46








                                 Intake & Output











 03/10/20 03/11/20 03/12/20





 06:59 06:59 06:59


 


Intake Total 700 650 


 


Output Total 1300 1325 


 


Balance -600 -675 


 


Weight 69.3 kg 68.7 kg 











General appearance: PRESENT: no acute distress, thin


Head exam: PRESENT: atraumatic


Eye exam: PRESENT: PERRLA


Ear exam: PRESENT: normal external ear exam


Mouth exam: PRESENT: moist, tongue midline


Teeth exam: PRESENT: poor dentation


Neck exam: ABSENT: carotid bruit, JVD, lymphadenopathy, thyromegaly


Respiratory exam: PRESENT: decreased breath sounds


Cardiovascular exam: PRESENT: +S1, +S2


Pulses: PRESENT: normal dorsalis pedis pul


GI/Abdominal exam: PRESENT: normal bowel sounds, soft.  ABSENT: distended, 

guarding, mass, organolmegaly, rebound, tenderness


Rectal exam: PRESENT: deferred


Extremities exam: PRESENT: full ROM.  ABSENT: calf tenderness, clubbing, pedal 

edema


Neurological exam: PRESENT: alert, awake, oriented to person, oriented to place,

oriented to time, oriented to situation, CN II-XII grossly intact.  ABSENT: 

motor sensory deficit


Psychiatric exam: PRESENT: appropriate affect, normal mood.  ABSENT: homicidal 

ideation, suicidal ideation





Results


Laboratory Results: 


                                        





                                 03/11/20 05:51 





                                 03/11/20 05:51 





                                        











  03/11/20 03/11/20





  05:51 05:51


 


WBC  3.9 L 


 


RBC  3.76 L 


 


Hgb  11.2 L 


 


Hct  31.3 L 


 


MCV  83 


 


MCH  29.8 


 


MCHC  35.8 


 


RDW  16.1 H 


 


Plt Count  158 


 


Seg Neutrophils %  Not Reportable 


 


Sodium   135.1 L


 


Potassium   3.8


 


Chloride   96 L


 


Carbon Dioxide   33 H


 


Anion Gap   6


 


BUN   35 H


 


Creatinine   0.54


 


Est GFR (African Amer)   > 60


 


Glucose   124 H


 


Calcium   8.7


 


Magnesium   2.9 H


 


Total Bilirubin   0.5


 


AST   25


 


Alkaline Phosphatase   57


 


Total Protein   5.1 L


 


Albumin   2.7 L








                                        











  03/04/20 03/04/20 03/05/20





  21:51 21:51 00:20


 


Creatine Kinase  128  


 


Troponin I   0.057  0.152


 


NT-Pro-B Natriuret Pep   5170 H 














  03/07/20





  13:37


 


Creatine Kinase 


 


Troponin I 


 


NT-Pro-B Natriuret Pep  5250 H











Impressions: 


                                        





Chest X-Ray  03/07/20 00:00


IMPRESSION:  Improved vascular congestion.


 














Assessment and Plan





- Diagnosis


(1) Atypical pneumonia


Is this a current diagnosis for this admission?: Yes   


Plan: 





Suspect atypical pneumonia, possibly gram-negative, likely bacterial infection 

followed viral illness


Ceftriaxone/doxycycline (azithromycin cannot be used due to very long QTC on 

admission)


Respiratory culture


Oxygen as needed with goal 89 to 94%


Bronchial hygiene





3/10/2020-patient admitted with atypical pneumonia.  Presently on ceftriaxone, 

doxycycline.  Plan BiPAP.  This chest x-ray suggestive of improved vascular 

congestion.  Cultures are negative.  Most likely patient has community-acquired 

pneumonia.








3/11/20-patient admitted with atypical pneumonia.  On doxycycline, ceftriaxone. 

Patient is off the BiPAP this morning.  Chest x-ray indicated with improved 

vascular congestion.  Patient might have community-acquired pneumonia.  blood 

Cultures from 3 4 indicate Streptococcus.








(2) COPD exacerbation


Is this a current diagnosis for this admission?: Yes   


Plan: 





Antibiotics


Nebulizer treatments


Steroids, transition to oral when able





3/10/2020-patient admitted with COPD exacerbation.  Presently receiving IV 

antibiotics, nebulizer treatments and methylprednisolone 40 mg IV every 8 hours.

 Plan is to continue to keep him on BiPAP.





3/11/20-patient admitted with COPD exacerbation requiring BiPAP.  He is off the 

BiPAP this morning.  Receiving IV antibiotic therapy and he is on IV Solu-Medrol

40 mg every 8 hours, scheduled nebulizer treatments.  Plan is to decrease IV 

Solu-Medrol to twice a day.








(3) CHF exacerbation


Qualifiers: 


   Heart failure type: unspecified   Qualified Code(s): I50.9 - Heart failure, 

unspecified   


Is this a current diagnosis for this admission?: Yes   


Plan: 





BNP elevated to over 5000; lungs slightly wet on exam


Likely due to A. fib, expect improvement with rate control


Continue home cardiac medications


Avoid IV fluids


Low-dose IV Lasix then reassess volume status and hemodynamics; may not need 

this long-term








3/10/2020-latest chest x-ray indicated above improving vascular congestion.  BNP

is elevated at the time of admission.  Patient is on Lasix at this time.  Blood 

pressure is 132/60 stable.  Request for echocardiogram.





3/11/20-echocardiogram is pending.  Patient is not in fluid overload.  Blood 

pressure is 160/87.








(4) Atrial fibrillation with RVR


Is this a current diagnosis for this admission?: Yes   


Plan: 





Acute on chronic


Likely due to respiratory failure and hypoxia


Diltiazem IV, transition back to oral diltiazem when able


Digoxin restarted








3/10/2020-patient admitted with paroxysmal A. fib with RVR.  Initially on 

diltiazem drip and now on p.o. digoxin, p.o. metoprolol, p.o. Cardizem.  Heart 

rate is going to 30s.  Plan is to hold digoxin, cardiazem,  metoprolol.  If the 

patient goes back into AF with RVR plan is to start on diltiazem drip and c

onsult cardiology.  To request for echocardiogram.








3/11/20 -heart rate is in the 80s today in sinus rhythm.  He become bradycardic 

yesterday and digoxin, metoprolol, Cardizem was on hold.  To start him back on 

metoprolol from today.








(5) HTN (hypertension)


Is this a current diagnosis for this admission?: Yes   


Plan: 





Home medications selectively restarted





3/10/2020-blood pressure today is 140/60.  Stable.





3/11/2020 blood pressure today is 159/70.  Slightly elevated.  To put him on IV 

hydralazine 10 mg every 6 hours PRN for systolic blood pressure more than 160.








(6) Crohn's disease


Is this a current diagnosis for this admission?: No   


Plan: 





Takes Pentasa at home, resume this after confirming dose with pharmacy, 

daughter did not recall dose on admission; suspect it will be 1 g taken 4 times 

daily








(7) Dementia


Is this a current diagnosis for this admission?: No   


Plan: 





Progressive memory loss and cognitive impairment per daughter, never officially

diagnosed with dementia per her


Suspect Alzheimer's








(8) Bradycardia


Is this a current diagnosis for this admission?: Yes   


Plan: 


3/10/2020-patient heart rate is in the 30s.  It may be secondary to medications.

 Patient is on digoxin, Cardizem, metoprolol.  Those medications will be on 

hold.  Echocardiogram is requested.








3/11/20-bradycardia due to medications resolved.  Heart rate is 86 in sinus 

rhythm.  Started back on metoprolol 25 mg p.o. twice daily.








(9) Influenza A


Is this a current diagnosis for this admission?: Yes   


Plan: 


3/11/2020-patient is positive for influenza A receiving Tamiflu at this time.








(10) Dysphagia


Is this a current diagnosis for this admission?: Yes   


Plan: 


3/11/2020-patient has problems with swallowing.  Speech consult was requested 

presently is on applesauce.  Plan to do the barium swallow tomorrow.








- Plan Summary


Summary: 


3/6/2020


Temperature 97.9 pulse 76 blood pressure 144/58 O2 sat 94% on 4 L


We will have nursing try to wean him back down to 3 L


Patient has no history of thyroid disease will recheck free T3 and free T4


Medications include Lasix 10 mg IV every 12 hours Solu-Medrol 40 mg IV every 12 

hours Vibramycin 100 mg every 12 hours and Rocephin 2 g daily


Plus other incidental medications


Patient states he is breathing slightly better than on admission.  Patient lives

at home with his daughter and son-in-law


WBCs on admission were 18,000 today 13,000


Chemistry is grossly normal


1 blood culture grew out Streptococcus species


Will consult discharge planning to assess patient's needs at time of discharge





3/7/2020


Patient is afebrile 97 7 pulse is still between 90 and 100 blood pressure is 

elevated 186/57, although earlier this morning it was 144/58


I am also going to increase his Apresoline to 20 mg as needed


Patient is on captopril 50 mg every 12 hours, Cardizem 60 mg every 8 hours, 

Lasix was increased to 20 mg IV every 12 hours, Solu-Medrol was increased to 40 

mg every 8 hours.


Patient was also on Toprol XL 25 mg daily prior to admission and I will resume 

this


Patient's oxygen saturation is between 91 and 97% on 2 L nasal cannula





Patient is on IV Vibramycin as well as Rocephin, sensitivities appear to be 

appropriate for these 2 drugs


Patient is making slow progress


Patient lives at home with family.  Patient states he was not on oxygen prior to

coming in


Chest x-ray does showed pneumonia and patient also was flu A+





3/8/2020


Patient is more agitated when he is awake although you can speak to him and he 

will calm down.  Patient pulled out his Ball catheter yesterday and caused a 

little bit of trauma resulting in hematuria which has cleared on its own.


Patient does much better when on the BiPAP but this does require Ativan


Overall blood pressures are starting to come down


ABGs done early this morning are actually fairly normal


White count on admission was 18,000 and is now now down to 7.8


Lactic acid remains normal





Had a long conversation yesterday with the patient's daughter, Danita, I 

explained to her how sick her father was and that this could be a life-

threatening illness.  Patient told me yesterday that "he would see me in 

heaven".  


I think we should continue on the same course of treatment since he appears to 

be gradually improving


Portable chest x-ray done yesterday is actually improved from admission





3/9/2020


This morning his temp is 97 6 pulse is anywhere from  blood pressure 

148/57 respiratory respiratory rates 25-37 on BiPAP he is between 90 and 98% 

saturation


Patient had a blood gas yesterday which was not all that terribly abnormal


Chemistry panel appears stable


1 blood culture cultures appears to be growing out a strep mitis , patient 

currently on IV doxycycline and IV Rocephin





Patient is requiring IV Haldol to keep him in the bed.  This morning I talked to

respiratory therapy about trying to switch him over to nasal cannula as he 

appears to be more anxious and agitated with the mask on than without it.  We 

will see how that goes





Patient's mental baseline was probably of some mild dementia certainly not is 

confused and altered as he is now.  I think patient's pulmonary status prior to 

admission was also marginal.





Patient's chest x-ray from 2 days ago really still shows just a cardiomegaly 

which is impressive but no acute pulmonary problems.


I will consult cardiology today to see if they have any insight

## 2020-03-12 LAB
ABSOLUTE LYMPHOCYTES# (MANUAL): 0.2 10^3/UL (ref 0.5–4.7)
ABSOLUTE MONOCYTES # (MANUAL): 0.3 10^3/UL (ref 0.1–1.4)
ADD MANUAL DIFF: YES
ALBUMIN SERPL-MCNC: 3.1 G/DL (ref 3.5–5)
ALP SERPL-CCNC: 67 U/L (ref 38–126)
ANION GAP SERPL CALC-SCNC: 7 MMOL/L (ref 5–19)
ANISOCYTOSIS BLD QL SMEAR: (no result)
AST SERPL-CCNC: 29 U/L (ref 17–59)
BASOPHILS NFR BLD MANUAL: 0 % (ref 0–2)
BILIRUB DIRECT SERPL-MCNC: 0.3 MG/DL (ref 0–0.4)
BILIRUB SERPL-MCNC: 0.7 MG/DL (ref 0.2–1.3)
BUN SERPL-MCNC: 33 MG/DL (ref 7–20)
CALCIUM: 8.8 MG/DL (ref 8.4–10.2)
CHLORIDE SERPL-SCNC: 94 MMOL/L (ref 98–107)
CO2 SERPL-SCNC: 33 MMOL/L (ref 22–30)
EOSINOPHIL NFR BLD MANUAL: 0 % (ref 0–6)
ERYTHROCYTE [DISTWIDTH] IN BLOOD BY AUTOMATED COUNT: 16.1 % (ref 11.5–14)
GLUCOSE SERPL-MCNC: 115 MG/DL (ref 75–110)
HCT VFR BLD CALC: 35.8 % (ref 37.9–51)
HGB BLD-MCNC: 12.6 G/DL (ref 13.5–17)
MCH RBC QN AUTO: 29.4 PG (ref 27–33.4)
MCHC RBC AUTO-ENTMCNC: 35.2 G/DL (ref 32–36)
MCV RBC AUTO: 84 FL (ref 80–97)
MONOCYTES % (MANUAL): 4 % (ref 3–13)
NEUTS BAND NFR BLD MANUAL: 1 % (ref 3–5)
OVALOCYTES BLD QL SMEAR: (no result)
PLATELET # BLD: 266 10^3/UL (ref 150–450)
PLATELET COMMENT: ADEQUATE
POIKILOCYTOSIS BLD QL SMEAR: (no result)
POTASSIUM SERPL-SCNC: 3.9 MMOL/L (ref 3.6–5)
PROT SERPL-MCNC: 5.8 G/DL (ref 6.3–8.2)
RBC # BLD AUTO: 4.29 10^6/UL (ref 4.35–5.55)
SEGMENTED NEUTROPHILS % (MAN): 92 % (ref 42–78)
TOTAL CELLS COUNTED BLD: 100
VARIANT LYMPHS NFR BLD MANUAL: 1 % (ref 13–45)
WBC # BLD AUTO: 7.8 10^3/UL (ref 4–10.5)

## 2020-03-12 RX ADMIN — CAPTOPRIL SCH MG: 25 TABLET ORAL at 22:49

## 2020-03-12 RX ADMIN — HALOPERIDOL LACTATE SCH: 5 INJECTION, SOLUTION INTRAMUSCULAR at 14:30

## 2020-03-12 RX ADMIN — METHYLPREDNISOLONE SODIUM SUCCINATE SCH MG: 40 INJECTION, POWDER, FOR SOLUTION INTRAMUSCULAR; INTRAVENOUS at 22:50

## 2020-03-12 RX ADMIN — FUROSEMIDE SCH MG: 10 INJECTION, SOLUTION INTRAMUSCULAR; INTRAVENOUS at 05:24

## 2020-03-12 RX ADMIN — IPRATROPIUM BROMIDE AND ALBUTEROL SULFATE PRN ML: 2.5; .5 SOLUTION RESPIRATORY (INHALATION) at 08:46

## 2020-03-12 RX ADMIN — FUROSEMIDE SCH MG: 10 INJECTION, SOLUTION INTRAMUSCULAR; INTRAVENOUS at 18:18

## 2020-03-12 RX ADMIN — METOPROLOL SUCCINATE SCH MG: 25 TABLET, EXTENDED RELEASE ORAL at 22:50

## 2020-03-12 RX ADMIN — METHYLPREDNISOLONE SODIUM SUCCINATE SCH MG: 40 INJECTION, POWDER, FOR SOLUTION INTRAMUSCULAR; INTRAVENOUS at 09:14

## 2020-03-12 RX ADMIN — OSELTAMIVIR PHOSPHATE SCH MG: 75 CAPSULE ORAL at 09:14

## 2020-03-12 RX ADMIN — HALOPERIDOL LACTATE SCH MG: 5 INJECTION, SOLUTION INTRAMUSCULAR at 03:32

## 2020-03-12 RX ADMIN — HALOPERIDOL LACTATE SCH MG: 5 INJECTION, SOLUTION INTRAMUSCULAR at 09:13

## 2020-03-12 RX ADMIN — OSELTAMIVIR PHOSPHATE SCH MG: 75 CAPSULE ORAL at 22:50

## 2020-03-12 RX ADMIN — HALOPERIDOL LACTATE SCH MG: 5 INJECTION, SOLUTION INTRAMUSCULAR at 22:50

## 2020-03-12 RX ADMIN — ENOXAPARIN SODIUM SCH MG: 40 INJECTION SUBCUTANEOUS at 09:13

## 2020-03-12 RX ADMIN — METOPROLOL SUCCINATE SCH MG: 25 TABLET, EXTENDED RELEASE ORAL at 09:14

## 2020-03-12 RX ADMIN — CAPTOPRIL SCH MG: 25 TABLET ORAL at 09:13

## 2020-03-12 RX ADMIN — HYDRALAZINE HYDROCHLORIDE PRN MG: 20 INJECTION INTRAMUSCULAR; INTRAVENOUS at 03:32

## 2020-03-12 RX ADMIN — IPRATROPIUM BROMIDE AND ALBUTEROL SULFATE PRN ML: 2.5; .5 SOLUTION RESPIRATORY (INHALATION) at 20:02

## 2020-03-12 NOTE — ST INP MODIFIED BARIUM SWALLOW
Medical Diagnosis





- Medical Diagnoses


Medical Diagnosis Description & ICD-10 Code(s): atypical pneumonia, COPD 

exacerbation, dementia





- ICD-10 Tx Diagnosis Coding


(1) Dysphagia


ICD-10 Code(s): R13.10 - DYSPHAGIA, UNSPECIFIED   





ST Inpatient [facility] – [place], [place]





- General


Date: 03/12/20


Date of Onset: 03/04/20 - admission date





- History


-: Medical - per EMR: Pt. is a 85 yo male who presented to ED on 3/4 with acute 

respiratory distress. Since his admission, pt. has been on and off BiPap, 

requiring restraints to keep him from removing mask. Was previously on adult 

diet, but placed NPO after failing repeat swallow screen. Pt.'s history is 

significant for chronic afib, HTN, HLD, COPD, and Crohn's disease. Pt. denies 

coughing or choking when eating or drinking at baseline.


Medications: Medications Reviewed


Allergies: Refer to medical record





- Subjective


Current Nutritional Means: NPO


Current PO Diet: N/A (NPO)


Current Symptoms: Coughing, Pneumonia


Pain: Patient reports, 0/5





- Objective


Assessment: Upright, Left Lateral





- Food Trials


Food Trials Used: Thin liquids, Pureed, Regular


The Patient: Was Able to Self Feed





- Assessment


Labial Function: Within Normal Limits


Lingual Function: Within Normal Limits


Mandibular Function: Within Normal Limits


Dentition: Edentulous - reports that he usually wears dentures, not present for 

study


Velo-Pharyngeal Function: Unremarkable





- Pharyngeal Stage


Initiation of Pharyngeal Stage: Normal


Decreased Laryngeal Elevation: No


Reduced Velo-Pharyngeal Closure: no


Reduced Pressure Generation: Yes - mild


Reduced Tongue Base Retraction: No


Pre-Swallowing Pooling in Valleculae: Mild


Pre-Swallowing Pooling in Pyriforms: None


Reduced Thyro-Hyiod Approximation: No


Reduced Epiglottic Excursion: Yes - mild


Multiple Swallows With: Cleared w/ Liquid Assist


Post Swallow Residuals in Valleculae: Moderate - with regular solids only


Post Swallow Residuals in Pyriforms: None


Pahryngeal Stage Comments: Residue of solids seen in valleculae after the 

swallow, cleared with liquid wash. Difficulty completing second dry swallow.





- Impression/Summary


Laryngeal Penetration: Yes - trace, with thin liquid


Tracheal Aspiration: no


Patient Presents With: Normal swallow at eval


Risk of Aspiration: Mild


Risk Due To: underlying COPD and dementia, difficulty with mastication





- Recommendations


Solid Diet Recommendations: Mechanical Soft, Chopped Meat


Liquid Diet Recommendations: Thin


Strict Aspitarion Precautions: Yes


Dysphagia Therapy with SLP: No


Recommended Techniques: Fully Upright During Meal, Small Bites and Sips, 

Alternate Bites/Sips


Other Recommendations: Recommend mechanical soft diet with chopped meats due to 

prolonged mastication and some difficulty with pharyngeally clearing regular 

solids. This may be due in part to patient not having dentures in for the study.





- Time


Total Time: 20


Total Timed Minutes: 20

## 2020-03-12 NOTE — RADIOLOGY REPORT (SQ)
EXAM DESCRIPTION:  CHANA SWALLOW



COMPLETED DATE/TIME:  3/12/2020 10:08 am



REASON FOR STUDY:  aspiration ATYPICAL PNEUMONIA



COMPARISON:  None.



TECHNIQUE:  Videofluoroscopic swallowing examination was performed in conjunction with speech patholo
gy.  Videofluoroscopic imaging was obtained and reviewed and these are the findings:



RADIATION DOSE:  2 minutes 28 seconds of fluoroscopy was used.

1 images saved to PACS.



LIMITATIONS:  None



FINDINGS:  The patient was brought into the fluoro room and placed upright on a modified barium swall
ow chair.  The patient was then given multiple consistencies mixed with barium to swallow under live 
fluoroscopic video guidance.  According to the Speech Pathologist there was trace laryngeal penetrati
on without aspiration. Post swallow residual contrast within the vallecular



IMPRESSION:  TRACE LARYNGEAL PENETRATION WITHOUT ASPIRATION. PLEASE SEE SPEECH PATHOLOGIST REPORT FOR
 OTHER FINDINGS AND RECOMMENDATIONS.



COMMENT:  Quality :  Final reports for procedures using fluoroscopy that document radiation exp
osure indices, or exposure time and number of fluorographic images (if radiation exposure indices are
 not available)



TECHNICAL DOCUMENTATION:  JOB ID: [id number]

 2011 [employer]- All Rights Reserved



Reading location - IP/workstation name: [email or web address]

## 2020-03-12 NOTE — PDOC PROGRESS REPORT
Subjective


Progress Note for:: 03/12/20


Subjective:: 





 86 year old male with past medical history significant for chronic A. fib, HTN,

HLD, COPD, Crohn's disease who presented to ED with severe respiratory distress 

that awoke him from sleep.  Patient contracted a viral upper respiratory illness

proximately 3 days prior to admission and this became worse gradually causing 

productive cough shortness of breath/ROBERTSON.  Patient does not use home oxygen 

prior to admission.  He was requiring BiPAP to maintain saturations on 

admission.  Given steroids, nebulizers, antibiotics on admission.  Chest x-ray 

did not specifically show pneumonia the lungs had scattered congestion on exam. 

Patient noted to be in A. fib with RVR on admission with rates in the 150s and 

is promptly came down with diltiazem drip with a rate of 10mg/h.  Daughter is in

charge of patient's medications and states he has not missed any doses including

his digoxin and diltiazem. 








3/10/20 86-year-old male with history of A. fib, hypertension, COPD, Crohn's 

disease admitted with severe respiratory distress.  He is still on BiPAP this 

morning.  Admission diagnosis is a COPD exacerbation.  He came in with A. fib 

with RVR and initially on Cardizem drip now is on diltiazem, metoprolol, 

digoxin.  Heart rate is dropped to 30s.  To hold digoxin Cardizem metoprolol for

now.  And if she goes back to A. fib with RVR plan is to consult cardiology.





3/[birth year]/54-89-uwln-old male with history of atrial fibrillation, hypertension, COPD

,Crohn's disease admitted with severe respiratory distress.  Patient is off the 

BiPAP this morning and communicating reasonably.  Not in distress.  He has a 

problem with swallowing and speech consult was requested.  We are going to 

arrange for a barium swallow tomorrow.





3/12/9132-19-vvki-old male with multiple medical problems admitted with severe 

respiratory distress.  Presently off the BiPAP.  Also found to have atypical 

pneumonia.  Speech consult was requested he is going for modified barium swallow

today.  Because of the behavior problems and pulling out IV lines he is on 

restraints.  Is also under one-to-one observation.


Reason For Visit: 


CAP,COPD EXACERBATION,ACUTE ON CHRONIC AFIB WITH








Physical Exam


Vital Signs: 


                                        











Temp Pulse Resp BP Pulse Ox


 


 98.0 F   83   16   176/68 H  98 


 


 03/12/20 03:16  03/12/20 08:51  03/12/20 08:51  03/12/20 03:16  03/12/20 08:51








                                 Intake & Output











 03/11/20 03/12/20 03/13/20





 06:59 06:59 06:59


 


Intake Total 650 600 


 


Output Total 1325 1725 


 


Balance -675 -1125 


 


Weight 68.7 kg 69.6 kg 











General appearance: PRESENT: no acute distress, thin


Head exam: PRESENT: atraumatic


Eye exam: PRESENT: PERRLA


Mouth exam: PRESENT: moist, tongue midline


Teeth exam: PRESENT: poor dentation


Neck exam: ABSENT: carotid bruit, JVD, lymphadenopathy, thyromegaly


Respiratory exam: PRESENT: decreased breath sounds


Cardiovascular exam: PRESENT: RRR.  ABSENT: diastolic murmur, rubs, systolic 

murmur


GI/Abdominal exam: PRESENT: normal bowel sounds, soft.  ABSENT: distended, 

guarding, mass, organolmegaly, rebound, tenderness


Rectal exam: PRESENT: deferred


Extremities exam: PRESENT: full ROM.  ABSENT: calf tenderness, clubbing, pedal 

edema


Neurological exam: PRESENT: alert, awake, CN II-XII grossly intact


Psychiatric exam: PRESENT: appropriate affect, normal mood.  ABSENT: homicidal 

ideation, suicidal ideation





Results


Laboratory Results: 


                                        





03/09/20 08:25   Catheterized Urine   Urine Culture - Final


                            NO GROWTH 2 DAYS





                                        











  03/04/20 03/04/20 03/05/20





  21:51 21:51 00:20


 


Creatine Kinase  128  


 


Troponin I   0.057  0.152


 


NT-Pro-B Natriuret Pep   5170 H 














  03/07/20





  13:37


 


Creatine Kinase 


 


Troponin I 


 


NT-Pro-B Natriuret Pep  5250 H











Impressions: 


                                        





Chest X-Ray  03/07/20 00:00


IMPRESSION:  Improved vascular congestion.


 














Assessment and Plan





- Diagnosis


(1) Atypical pneumonia


Is this a current diagnosis for this admission?: Yes   


Plan: 





Suspect atypical pneumonia, possibly gram-negative, likely bacterial infection 

followed viral illness


Ceftriaxone/doxycycline (azithromycin cannot be used due to very long QTC on 

admission)


Respiratory culture


Oxygen as needed with goal 89 to 94%


Bronchial hygiene





3/10/2020-patient admitted with atypical pneumonia.  Presently on ceftriaxone, 

doxycycline.  Plan BiPAP.  This chest x-ray suggestive of improved vascular 

congestion.  Cultures are negative.  Most likely patient has community-acquired 

pneumonia.








3/11/20-patient admitted with atypical pneumonia.  On doxycycline, ceftriaxone. 

Patient is off the BiPAP this morning.  Chest x-ray indicated with improved 

vascular congestion.  Patient might have community-acquired pneumonia.  blood 

Cultures from 3 /4 indicate Streptococcus.





3/12/2020-patient admitted with atypical pneumonia.  Presently on IV ceftriaxone

and doxycycline.  Blood cultures are positive for Streptococcus.  Plan is to 

continue the present antibiotic therapy patient is afebrile.








(2) COPD exacerbation


Is this a current diagnosis for this admission?: Yes   


Plan: 





Antibiotics


Nebulizer treatments


Steroids, transition to oral when able





3/10/2020-patient admitted with COPD exacerbation.  Presently receiving IV 

antibiotics, nebulizer treatments and methylprednisolone 40 mg IV every 8 hours.

 Plan is to continue to keep him on BiPAP.





3/11/20-patient admitted with COPD exacerbation requiring BiPAP.  He is off the 

BiPAP this morning.  Receiving IV antibiotic therapy and he is on IV Solu-Medrol

40 mg every 8 hours, scheduled nebulizer treatments.  Plan is to decrease IV 

Solu-Medrol to twice a day.








3/12/2020-patient admitted with COPD exacerbation on oxygen via nasal cannula.  

He is on BiPAP until yesterday morning.  Receiving doxycycline and ceftriaxone. 

He is also on Solu-Medrol 40 mg every 12 hours.  On examination this morning 

bilateral wheezing is present is going to receive PRN nebulizations.








(3) CHF exacerbation


Qualifiers: 


   Heart failure type: unspecified   Qualified Code(s): I50.9 - Heart failure, 

unspecified   


Is this a current diagnosis for this admission?: Yes   


Plan: 





BNP elevated to over 5000; lungs slightly wet on exam


Likely due to A. fib, expect improvement with rate control


Continue home cardiac medications


Avoid IV fluids


Low-dose IV Lasix then reassess volume status and hemodynamics; may not need 

this long-term








3/10/2020-latest chest x-ray indicated above improving vascular congestion.  BNP

is elevated at the time of admission.  Patient is on Lasix at this time.  Blood 

pressure is 132/60 stable.  Request for echocardiogram.





3/11/20-echocardiogram is pending.  Patient is not in fluid overload.  Blood 

pressure is 160/87.








3/12/2020-audiogram shows normal EF with 50 to 55% EF.  Mildly reduced right 

ventricular function.  Patient is not in fluid overload.  Patient is on p.o. 

Lasix.  Blood pressures are stable.








(4) Atrial fibrillation with RVR


Is this a current diagnosis for this admission?: Yes   


Plan: 





Acute on chronic


Likely due to respiratory failure and hypoxia


Diltiazem IV, transition back to oral diltiazem when able


Digoxin restarted








3/10/2020-patient admitted with paroxysmal A. fib with RVR.  Initially on 

diltiazem drip and now on p.o. digoxin, p.o. metoprolol, p.o. Cardizem.  Heart 

rate is going to 30s.  Plan is to hold digoxin, cardiazem,  metoprolol.  If the 

patient goes back into AF with RVR plan is to start on diltiazem drip and 

consult cardiology.  To request for echocardiogram.








3/11/20 -heart rate is in the 80s today in sinus rhythm.  He become bradycardic 

yesterday and digoxin, metoprolol, Cardizem was on hold.  To start him back on 

metoprolol from today.








3/12/2020-heart rate in the 90s.  In sinus rhythm.  Plan is to restart dig and 

Cardizem from today because bradycardia is resolved.








(5) HTN (hypertension)


Is this a current diagnosis for this admission?: Yes   


Plan: 





Home medications selectively restarted





3/10/2020-blood pressure today is 140/60.  Stable.





3/11/2020 blood pressure today is 159/70.  Slightly elevated.  To put him on IV 

hydralazine 10 mg every 6 hours PRN for systolic blood pressure more than 160.








3/20/2020-blood pressure today is 176/88.  Plan is to continue hydralazine 10 mg

IV every 6 PRN for systolic blood pressure more than 160.








(6) Crohn's disease


Is this a current diagnosis for this admission?: No   


Plan: 





Takes Pentasa at home, resume this after confirming dose with pharmacy, 

daughter did not recall dose on admission; suspect it will be 1 g taken 4 times 

daily








(7) Dementia


Is this a current diagnosis for this admission?: No   


Plan: 





Progressive memory loss and cognitive impairment per daughter, never officially

diagnosed with dementia per her


Suspect Alzheimer's








(8) Bradycardia


Is this a current diagnosis for this admission?: Yes   


Plan: 


3/10/2020-patient heart rate is in the 30s.  It may be secondary to medications.

 Patient is on digoxin, Cardizem, metoprolol.  Those medications will be on 

hold.  Echocardiogram is requested.








3/11/20-bradycardia due to medications resolved.  Heart rate is 86 in sinus 

rhythm.  Started back on metoprolol 25 mg p.o. twice daily.








(9) Influenza A


Is this a current diagnosis for this admission?: Yes   


Plan: 


3/11/2020-patient is positive for influenza A receiving Tamiflu at this time.








(10) Dysphagia


Is this a current diagnosis for this admission?: Yes   


Plan: 


3/11/2020-patient has problems with swallowing.  Speech consult was requested 

presently is on applesauce.  Plan to do the barium swallow tomorrow.





3/12/2020-patient is going for modified barium swallow today.








- Plan Summary


Summary: 


3/6/2020


Temperature 97.9 pulse 76 blood pressure 144/58 O2 sat 94% on 4 L


We will have nursing try to wean him back down to 3 L


Patient has no history of thyroid disease will recheck free T3 and free T4


Medications include Lasix 10 mg IV every 12 hours Solu-Medrol 40 mg IV every 12 

hours Vibramycin 100 mg every 12 hours and Rocephin 2 g daily


Plus other incidental medications


Patient states he is breathing slightly better than on admission.  Patient lives

at home with his daughter and son-in-law


WBCs on admission were 18,000 today 13,000


Chemistry is grossly normal


1 blood culture grew out Streptococcus species


Will consult discharge planning to assess patient's needs at time of discharge





3/7/2020


Patient is afebrile 97 7 pulse is still between 90 and 100 blood pressure is 

elevated 186/57, although earlier this morning it was 144/58


I am also going to increase his Apresoline to 20 mg as needed


Patient is on captopril 50 mg every 12 hours, Cardizem 60 mg every 8 hours, 

Lasix was increased to 20 mg IV every 12 hours, Solu-Medrol was increased to 40 

mg every 8 hours.


Patient was also on Toprol XL 25 mg daily prior to admission and I will resume 

this


Patient's oxygen saturation is between 91 and 97% on 2 L nasal cannula





Patient is on IV Vibramycin as well as Rocephin, sensitivities appear to be 

appropriate for these 2 drugs


Patient is making slow progress


Patient lives at home with family.  Patient states he was not on oxygen prior to

coming in


Chest x-ray does showed pneumonia and patient also was flu A+





3/8/2020


Patient is more agitated when he is awake although you can speak to him and he 

will calm down.  Patient pulled out his Ball catheter yesterday and caused a 

little bit of trauma resulting in hematuria which has cleared on its own.


Patient does much better when on the BiPAP but this does require Ativan


Overall blood pressures are starting to come down


ABGs done early this morning are actually fairly normal


White count on admission was 18,000 and is now now down to 7.8


Lactic acid remains normal





Had a long conversation yesterday with the patient's daughter, Danita, I 

explained to her how sick her father was and that this could be a life-

threatening illness.  Patient told me yesterday that "he would see me in 

[facility]".  


I think we should continue on the same course of treatment since he appears to 

be gradually improving


Portable chest x-ray done yesterday is actually improved from admission





3/9/2020


This morning his temp is 97 6 pulse is anywhere from  blood pressure 

148/57 respiratory respiratory rates 25-37 on BiPAP he is between 90 and 98% 

saturation


Patient had a blood gas yesterday which was not all that terribly abnormal


Chemistry panel appears stable


1 blood culture cultures appears to be growing out a strep mitis , patient 

currently on IV doxycycline and IV Rocephin





Patient is requiring IV Haldol to keep him in the bed.  This morning I talked to

respiratory therapy about trying to switch him over to nasal cannula as he 

appears to be more anxious and agitated with the mask on than without it.  We 

will see how that goes





Patient's mental baseline was probably of some mild dementia certainly not is 

confused and altered as he is now.  I think patient's pulmonary status prior to 

admission was also marginal.





Patient's chest x-ray from 2 days ago really still shows just a cardiomegaly 

which is impressive but no acute pulmonary problems.


I will consult cardiology today to see if they have any insight

## 2020-03-13 LAB
ABSOLUTE LYMPHOCYTES# (MANUAL): 0.4 10^3/UL (ref 0.5–4.7)
ABSOLUTE MONOCYTES # (MANUAL): 0.4 10^3/UL (ref 0.1–1.4)
ADD MANUAL DIFF: YES
ALBUMIN SERPL-MCNC: 2.9 G/DL (ref 3.5–5)
ALP SERPL-CCNC: 61 U/L (ref 38–126)
ANION GAP SERPL CALC-SCNC: 7 MMOL/L (ref 5–19)
ANISOCYTOSIS BLD QL SMEAR: (no result)
AST SERPL-CCNC: 30 U/L (ref 17–59)
BASOPHILS NFR BLD MANUAL: 0 % (ref 0–2)
BILIRUB DIRECT SERPL-MCNC: 0.2 MG/DL (ref 0–0.4)
BILIRUB SERPL-MCNC: 0.8 MG/DL (ref 0.2–1.3)
BUN SERPL-MCNC: 29 MG/DL (ref 7–20)
CALCIUM: 8.6 MG/DL (ref 8.4–10.2)
CHLORIDE SERPL-SCNC: 93 MMOL/L (ref 98–107)
CO2 SERPL-SCNC: 30 MMOL/L (ref 22–30)
EOSINOPHIL NFR BLD MANUAL: 0 % (ref 0–6)
ERYTHROCYTE [DISTWIDTH] IN BLOOD BY AUTOMATED COUNT: 16 % (ref 11.5–14)
GLUCOSE SERPL-MCNC: 122 MG/DL (ref 75–110)
HCT VFR BLD CALC: 34.3 % (ref 37.9–51)
HGB BLD-MCNC: 12 G/DL (ref 13.5–17)
MCH RBC QN AUTO: 29 PG (ref 27–33.4)
MCHC RBC AUTO-ENTMCNC: 35.1 G/DL (ref 32–36)
MCV RBC AUTO: 83 FL (ref 80–97)
MONOCYTES % (MANUAL): 3 % (ref 3–13)
OVALOCYTES BLD QL SMEAR: SLIGHT
PLATELET # BLD: 249 10^3/UL (ref 150–450)
PLATELET COMMENT: ADEQUATE
POTASSIUM SERPL-SCNC: 3.9 MMOL/L (ref 3.6–5)
PROT SERPL-MCNC: 5.5 G/DL (ref 6.3–8.2)
RBC # BLD AUTO: 4.15 10^6/UL (ref 4.35–5.55)
SCHISTOCYTES BLD QL SMEAR: SLIGHT
SEGMENTED NEUTROPHILS % (MAN): 94 % (ref 42–78)
TOTAL CELLS COUNTED BLD: 100
TOXIC GRANULES BLD QL SMEAR: SLIGHT
VARIANT LYMPHS NFR BLD MANUAL: 2 % (ref 13–45)
WBC # BLD AUTO: 12.5 10^3/UL (ref 4–10.5)

## 2020-03-13 RX ADMIN — FUROSEMIDE SCH MG: 10 INJECTION, SOLUTION INTRAMUSCULAR; INTRAVENOUS at 05:50

## 2020-03-13 RX ADMIN — HALOPERIDOL LACTATE SCH MG: 5 INJECTION, SOLUTION INTRAMUSCULAR at 22:05

## 2020-03-13 RX ADMIN — METOPROLOL SUCCINATE SCH MG: 25 TABLET, EXTENDED RELEASE ORAL at 09:48

## 2020-03-13 RX ADMIN — MESALAMINE SCH: 250 CAPSULE ORAL at 22:10

## 2020-03-13 RX ADMIN — HALOPERIDOL LACTATE SCH: 5 INJECTION, SOLUTION INTRAMUSCULAR at 09:38

## 2020-03-13 RX ADMIN — OSELTAMIVIR PHOSPHATE SCH MG: 75 CAPSULE ORAL at 09:49

## 2020-03-13 RX ADMIN — MESALAMINE SCH MG: 250 CAPSULE ORAL at 22:10

## 2020-03-13 RX ADMIN — MESALAMINE SCH MG: 250 CAPSULE ORAL at 09:50

## 2020-03-13 RX ADMIN — FERROUS SULFATE TAB 325 MG (65 MG ELEMENTAL FE) SCH MG: 325 (65 FE) TAB at 16:06

## 2020-03-13 RX ADMIN — MELOXICAM SCH MG: 15 TABLET ORAL at 16:05

## 2020-03-13 RX ADMIN — OSELTAMIVIR PHOSPHATE SCH MG: 75 CAPSULE ORAL at 22:06

## 2020-03-13 RX ADMIN — IPRATROPIUM BROMIDE AND ALBUTEROL SULFATE PRN ML: 2.5; .5 SOLUTION RESPIRATORY (INHALATION) at 08:14

## 2020-03-13 RX ADMIN — HALOPERIDOL LACTATE SCH MG: 5 INJECTION, SOLUTION INTRAMUSCULAR at 04:00

## 2020-03-13 RX ADMIN — TAMSULOSIN HYDROCHLORIDE SCH MG: 0.4 CAPSULE ORAL at 22:06

## 2020-03-13 RX ADMIN — METHYLPREDNISOLONE SODIUM SUCCINATE SCH MG: 40 INJECTION, POWDER, FOR SOLUTION INTRAMUSCULAR; INTRAVENOUS at 09:48

## 2020-03-13 RX ADMIN — ATORVASTATIN CALCIUM SCH MG: 40 TABLET, FILM COATED ORAL at 22:06

## 2020-03-13 RX ADMIN — HYDRALAZINE HYDROCHLORIDE PRN MG: 20 INJECTION INTRAMUSCULAR; INTRAVENOUS at 00:24

## 2020-03-13 RX ADMIN — CAPTOPRIL SCH MG: 25 TABLET ORAL at 09:49

## 2020-03-13 RX ADMIN — DIGOXIN SCH MG: 0.12 TABLET ORAL at 16:06

## 2020-03-13 RX ADMIN — CAPTOPRIL SCH MG: 25 TABLET ORAL at 22:10

## 2020-03-13 RX ADMIN — HALOPERIDOL LACTATE SCH: 5 INJECTION, SOLUTION INTRAMUSCULAR at 16:03

## 2020-03-13 RX ADMIN — METOPROLOL SUCCINATE SCH MG: 25 TABLET, EXTENDED RELEASE ORAL at 22:11

## 2020-03-13 RX ADMIN — METHYLPREDNISOLONE SODIUM SUCCINATE SCH MG: 40 INJECTION, POWDER, FOR SOLUTION INTRAMUSCULAR; INTRAVENOUS at 22:06

## 2020-03-13 RX ADMIN — ENOXAPARIN SODIUM SCH MG: 40 INJECTION SUBCUTANEOUS at 09:48

## 2020-03-13 RX ADMIN — MESALAMINE SCH MG: 250 CAPSULE ORAL at 16:06

## 2020-03-13 NOTE — PDOC PROGRESS REPORT
Subjective


Progress Note for:: 03/13/1930


Subjective:: 





 86 year old male with past medical history significant for chronic A. fib, HTN,

HLD, COPD, Crohn's disease who presented to ED with severe respiratory distress 

that awoke him from sleep.  Patient contracted a viral upper respiratory illness

proximately 3 days prior to admission and this became worse gradually causing 

productive cough shortness of breath/ROBERTSON.  Patient does not use home oxygen 

prior to admission.  He was requiring BiPAP to maintain saturations on 

admission.  Given steroids, nebulizers, antibiotics on admission.  Chest x-ray 

did not specifically show pneumonia the lungs had scattered congestion on exam. 

Patient noted to be in A. fib with RVR on admission with rates in the 150s and 

is promptly came down with diltiazem drip with a rate of 10mg/h.  Daughter is in

charge of patient's medications and states he has not missed any doses including

his digoxin and diltiazem. 








3/10/20 86-year-old male with history of A. fib, hypertension, COPD, Crohn's 

disease admitted with severe respiratory distress.  He is still on BiPAP this 

morning.  Admission diagnosis is a COPD exacerbation.  He came in with A. fib 

with RVR and initially on Cardizem drip now is on diltiazem, metoprolol, 

digoxin.  Heart rate is dropped to 30s.  To hold digoxin Cardizem metoprolol for

now.  And if she goes back to A. fib with RVR plan is to consult cardiology.





3/11/50-64-eytr-old male with history of atrial fibrillation, hypertension, COPD

,Crohn's disease admitted with severe respiratory distress.  Patient is off the 

BiPAP this morning and communicating reasonably.  Not in distress.  He has a 

problem with swallowing and speech consult was requested.  We are going to 

arrange for a barium swallow tomorrow.





3/12/2078-32-ljus-old male with multiple medical problems admitted with severe 

respiratory distress.  Presently off the BiPAP.  Also found to have atypical 

pneumonia.  Speech consult was requested he is going for modified barium swallow

today.  Because of the behavior problems and pulling out IV lines he is on 

restraints.  Is also under one-to-one observation.





3/13/36-36-fgxr-old male with history of A. fib, hypertension, hyperlipidemia, 

COPD, Crohn's disease admitted with severe respiratory distress.  Patient is off

the BiPAP for the last 24 hours to 36 hours.  Pulse ox is 96% 4 L.  Speech 

evaluation was done recommendation is mechanical soft diet with thin liquids.  

Patient is comfortably in the bed not in distress.


Reason For Visit: 


CAP,COPD EXACERBATION,ACUTE ON CHRONIC AFIB WITH








Physical Exam


Vital Signs: 


                                        











Temp Pulse Resp BP Pulse Ox


 


 97.4 F   93   20   160/72 H  96 


 


 03/13/20 03:20  03/13/20 08:14  03/13/20 08:14  03/13/20 03:20  03/13/20 08:14








                                 Intake & Output











 03/12/20 03/13/20 03/14/20





 06:59 06:59 06:59


 


Intake Total 600 1497 


 


Output Total 1725 1075 


 


Balance -1125 422 


 


Weight 69.6 kg 67.7 kg 











General appearance: PRESENT: no acute distress, thin


Head exam: PRESENT: atraumatic


Eye exam: PRESENT: PERRLA


Mouth exam: PRESENT: moist, tongue midline


Teeth exam: PRESENT: poor dentation


Neck exam: ABSENT: carotid bruit, JVD, lymphadenopathy, thyromegaly


Respiratory exam: PRESENT: decreased breath sounds


Cardiovascular exam: PRESENT: RRR.  ABSENT: diastolic murmur, rubs, systolic 

murmur


GI/Abdominal exam: PRESENT: normal bowel sounds, soft.  ABSENT: distended, 

guarding, mass, organolmegaly, rebound, tenderness


Rectal exam: PRESENT: deferred


Extremities exam: PRESENT: full ROM.  ABSENT: calf tenderness, clubbing, pedal 

edema


Neurological exam: PRESENT: alert, awake, oriented to person, oriented to place,

oriented to time, oriented to situation, CN II-XII grossly intact.  ABSENT: 

motor sensory deficit


Psychiatric exam: PRESENT: appropriate affect, normal mood.  ABSENT: homicidal 

ideation, suicidal ideation





Results


Laboratory Results: 


                                        





                                 03/13/20 05:53 





                                 03/13/20 05:53 





                                        











  03/12/20 03/12/20 03/13/20





  08:33 08:33 05:53


 


WBC  7.8   12.5 H


 


RBC  4.29 L   4.15 L


 


Hgb  12.6 L   12.0 L


 


Hct  35.8 L   34.3 L


 


MCV  84   83


 


MCH  29.4   29.0


 


MCHC  35.2   35.1


 


RDW  16.1 H   16.0 H


 


Plt Count  266   249


 


Seg Neutrophils %  Not Reportable   Not Reportable


 


Sodium   134.2 L 


 


Potassium   3.9 


 


Chloride   94 L 


 


Carbon Dioxide   33 H 


 


Anion Gap   7 


 


BUN   33 H 


 


Creatinine   0.55 


 


Est GFR ( Amer)   > 60 


 


Glucose   115 H 


 


Calcium   8.8 


 


Magnesium   2.8 H 


 


Total Bilirubin   0.7 


 


AST   29 


 


Alkaline Phosphatase   67 


 


Total Protein   5.8 L 


 


Albumin   3.1 L 














  03/13/20





  05:53


 


WBC 


 


RBC 


 


Hgb 


 


Hct 


 


MCV 


 


MCH 


 


MCHC 


 


RDW 


 


Plt Count 


 


Seg Neutrophils % 


 


Sodium  129.6 L


 


Potassium  3.9


 


Chloride  93 L


 


Carbon Dioxide  30


 


Anion Gap  7


 


BUN  29 H


 


Creatinine  0.48 L


 


Est GFR (African Amer)  > 60


 


Glucose  122 H


 


Calcium  8.6


 


Magnesium  2.6 H


 


Total Bilirubin  0.8


 


AST  30


 


Alkaline Phosphatase  61


 


Total Protein  5.5 L


 


Albumin  2.9 L








                                        











  03/04/20 03/04/20 03/05/20





  21:51 21:51 00:20


 


Creatine Kinase  128  


 


Troponin I   0.057  0.152


 


NT-Pro-B Natriuret Pep   5170 H 














  03/07/20





  13:37


 


Creatine Kinase 


 


Troponin I 


 


NT-Pro-B Natriuret Pep  5250 H











Impressions: 


                                        





Chest X-Ray  03/07/20 00:00


IMPRESSION:  Improved vascular congestion.


 








Modified Barium Swallow  03/12/20 00:00


IMPRESSION:  TRACE LARYNGEAL PENETRATION WITHOUT ASPIRATION. PLEASE SEE SPEECH 

PATHOLOGIST REPORT FOR OTHER FINDINGS AND RECOMMENDATIONS.


 














Assessment and Plan





- Diagnosis


(1) Atypical pneumonia


Is this a current diagnosis for this admission?: Yes   


Plan: 





Suspect atypical pneumonia, possibly gram-negative, likely bacterial infection 

followed viral illness


Ceftriaxone/doxycycline (azithromycin cannot be used due to very long QTC on 

admission)


Respiratory culture


Oxygen as needed with goal 89 to 94%


Bronchial hygiene





3/10/2020-patient admitted with atypical pneumonia.  Presently on ceftriaxone, 

doxycycline.  Plan BiPAP.  This chest x-ray suggestive of improved vascular 

congestion.  Cultures are negative.  Most likely patient has community-acquired 

pneumonia.








3/11/20-patient admitted with atypical pneumonia.  On doxycycline, ceftriaxone. 

Patient is off the BiPAP this morning.  Chest x-ray indicated with improved 

vascular congestion.  Patient might have community-acquired pneumonia.  blood 

Cultures from 3 /4 indicate Streptococcus.





3/12/2020-patient admitted with atypical pneumonia.  Presently on IV ceftriaxone

and doxycycline.  Blood cultures are positive for Streptococcus.  Plan is to 

continue the present antibiotic therapy patient is afebrile.








3/13/2020-patient admitted with apical pneumonia presently on IV ceftriaxone and

doxycycline.  Blood cultures are positive for Streptococcus.  Not on antibiotics

at this time.  Patient is flu positive and Tamiflu.








(2) COPD exacerbation


Is this a current diagnosis for this admission?: Yes   


Plan: 





Antibiotics


Nebulizer treatments


Steroids, transition to oral when able





3/10/2020-patient admitted with COPD exacerbation.  Presently receiving IV 

antibiotics, nebulizer treatments and methylprednisolone 40 mg IV every 8 hours.

 Plan is to continue to keep him on BiPAP.





3/11/20-patient admitted with COPD exacerbation requiring BiPAP.  He is off the 

BiPAP this morning.  Receiving IV antibiotic therapy and he is on IV Solu-Medrol

40 mg every 8 hours, scheduled nebulizer treatments.  Plan is to decrease IV 

Solu-Medrol to twice a day.








3/12/2020-patient admitted with COPD exacerbation on oxygen via nasal cannula.  

He is on BiPAP until yesterday morning.  Receiving doxycycline and ceftriaxone. 

He is also on Solu-Medrol 40 mg every 12 hours.  On examination this morning 

bilateral wheezing is present is going to receive PRN nebulizations.








3/13/2020-patient admitted with COPD exacerbation.  Pulse ox is 96% on 4 L corrie

ent is off the BiPAP for the last 36 hours.  Plan is to decrease the IV Solu-

Medrol to once a day continue as scheduled and as needed nebulizations.








(3) CHF exacerbation


Qualifiers: 


   Heart failure type: unspecified   Qualified Code(s): I50.9 - Heart failure, 

unspecified   


Is this a current diagnosis for this admission?: Yes   


Plan: 





BNP elevated to over 5000; lungs slightly wet on exam


Likely due to A. fib, expect improvement with rate control


Continue home cardiac medications


Avoid IV fluids


Low-dose IV Lasix then reassess volume status and hemodynamics; may not need 

this long-term








3/10/2020-latest chest x-ray indicated above improving vascular congestion.  BNP

is elevated at the time of admission.  Patient is on Lasix at this time.  Blood 

pressure is 132/60 stable.  Request for echocardiogram.





3/11/20-echocardiogram is pending.  Patient is not in fluid overload.  Blood 

pressure is 160/87.








3/12/2020-echo shows normal EF with 50 to 55% EF.  Mildly reduced right 

ventricular function.  Patient is not in fluid overload.  Patient is on p.o. 

Lasix.  Blood pressures are stable.





3/13/20-echocardiogram indicates reduced mildly reduced right ventricular funct

ion.  Patient may have a mild diastolic dysfunction which is chronic.  Euvolemic

at the time of admission.








(4) Atrial fibrillation with RVR


Is this a current diagnosis for this admission?: Yes   


Plan: 





Acute on chronic


Likely due to respiratory failure and hypoxia


Diltiazem IV, transition back to oral diltiazem when able


Digoxin restarted








3/10/2020-patient admitted with paroxysmal A. fib with RVR.  Initially on 

diltiazem drip and now on p.o. digoxin, p.o. metoprolol, p.o. Cardizem.  Heart 

rate is going to 30s.  Plan is to hold digoxin, cardiazem,  metoprolol.  If the 

patient goes back into AF with RVR plan is to start on diltiazem drip and 

consult cardiology.  To request for echocardiogram.








3/11/20 -heart rate is in the 80s today in sinus rhythm.  He become bradycardic 

yesterday and digoxin, metoprolol, Cardizem was on hold.  To start him back on 

metoprolol from today.








3/12/2020-heart rate in the 90s.  In sinus rhythm.  Plan is to restart dig and 

Cardizem from today because bradycardia is resolved.








8/13/2020-heart rate is in the 90s.  Digoxin and Cardizem restarted and 

bradycardia is resolved.








(5) HTN (hypertension)


Is this a current diagnosis for this admission?: Yes   


Plan: 





Home medications selectively restarted





3/10/2020-blood pressure today is 140/60.  Stable.





3/11/2020 blood pressure today is 159/70.  Slightly elevated.  To put him on IV 

hydralazine 10 mg every 6 hours PRN for systolic blood pressure more than 160.








3/20/2020-blood pressure today is 176/88.  Plan is to continue hydralazine 10 mg

IV every 6 PRN for systolic blood pressure more than 160.





3/13/20- bp is 157/80   to restart on po lasix..








(6) Crohn's disease


Is this a current diagnosis for this admission?: No   


Plan: 





Takes Pentasa at home, resume this after confirming dose with pharmacy, 

daughter did not recall dose on admission; suspect it will be 1 g taken 4 times 

daily








(7) Dementia


Is this a current diagnosis for this admission?: No   


Plan: 





Progressive memory loss and cognitive impairment per daughter, never officially

diagnosed with dementia per her


Suspect Alzheimer's








(8) Bradycardia


Is this a current diagnosis for this admission?: Yes   


Plan: 


3/10/2020-patient heart rate is in the 30s.  It may be secondary to medications.

 Patient is on digoxin, Cardizem, metoprolol.  Those medications will be on 

hold.  Echocardiogram is requested.








3/11/20-bradycardia due to medications resolved.  Heart rate is 86 in sinus 

rhythm.  Started back on metoprolol 25 mg p.o. twice daily.





3/13/20- heart rate is 96 .. restarted on digoxin and cardiazem..








(9) Influenza A


Is this a current diagnosis for this admission?: Yes   


Plan: 


3/11/2020-patient is positive for influenza A receiving Tamiflu at this time.








(10) Dysphagia


Is this a current diagnosis for this admission?: Yes   


Plan: 


3/11/2020-patient has problems with swallowing.  Speech consult was requested 

presently is on applesauce.  Plan to do the barium swallow tomorrow.





3/12/2020-patient is going for modified barium swallow today.





3/13/20-speech consult done.. recis to be on [facility] soft diet  








- Plan Summary


Summary: 


3/6/2020


Temperature 97.9 pulse 76 blood pressure 144/58 O2 sat 94% on 4 L


We will have nursing try to wean him back down to 3 L


Patient has no history of thyroid disease will recheck free T3 and free T4


Medications include Lasix 10 mg IV every 12 hours Solu-Medrol 40 mg IV every 12 

hours Vibramycin 100 mg every 12 hours and Rocephin 2 g daily


Plus other incidental medications


Patient states he is breathing slightly better than on admission.  Patient lives

at home with his daughter and son-in-law


WBCs on admission were 18,000 today 13,000


Chemistry is grossly normal


1 blood culture grew out Streptococcus species


Will consult discharge planning to assess patient's needs at time of discharge





3/7/2020


Patient is afebrile 97 7 pulse is still between 90 and 100 blood pressure is charlene

vated 186/57, although earlier this morning it was 144/58


I am also going to increase his Apresoline to 20 mg as needed


Patient is on captopril 50 mg every 12 hours, Cardizem 60 mg every 8 hours, Lasi

x was increased to 20 mg IV every 12 hours, Solu-Medrol was increased to 40 mg 

every 8 hours.


Patient was also on Toprol XL 25 mg daily prior to admission and I will resume 

this


Patient's oxygen saturation is between 91 and 97% on 2 L nasal cannula





Patient is on IV Vibramycin as well as Rocephin, sensitivities appear to be 

appropriate for these 2 drugs


Patient is making slow progress


Patient lives at home with family.  Patient states he was not on oxygen prior to

coming in


Chest x-ray does showed pneumonia and patient also was flu A+





3/8/2020


Patient is more agitated when he is awake although you can speak to him and he 

will calm down.  Patient pulled out his Ball catheter yesterday and caused a 

little bit of trauma resulting in hematuria which has cleared on its own.


Patient does much better when on the BiPAP but this does require Ativan


Overall blood pressures are starting to come down


ABGs done early this morning are actually fairly normal


White count on admission was 18,000 and is now now down to 7.8


Lactic acid remains normal





Had a long conversation yesterday with the patient's daughter, Danita, I 

explained to her how sick her father was and that this could be a life-thr

eatening illness.  Patient told me yesterday that "he would see me in [facility]".  


I think we should continue on the same course of treatment since he appears to 

be gradually improving


Portable chest x-ray done yesterday is actually improved from admission





3/9/2020


This morning his temp is 97 6 pulse is anywhere from  blood pressure 

148/57 respiratory respiratory rates 25-37 on BiPAP he is between 90 and 98% 

saturation


Patient had a blood gas yesterday which was not all that terribly abnormal


Chemistry panel appears stable


1 blood culture cultures appears to be growing out a strep mitis , patient 

currently on IV doxycycline and IV Rocephin





Patient is requiring IV Haldol to keep him in the bed.  This morning I talked to

respiratory therapy about trying to switch him over to nasal cannula as he 

appears to be more anxious and agitated with the mask on than without it.  We 

will see how that goes





Patient's mental baseline was probably of some mild dementia certainly not is 

confused and altered as he is now.  I think patient's pulmonary status prior to 

admission was also marginal.





Patient's chest x-ray from 2 days ago really still shows just a cardiomegaly 

which is impressive but no acute pulmonary problems.


I will consult cardiology today to see if they have any insight

## 2020-03-14 RX ADMIN — HALOPERIDOL LACTATE SCH MG: 5 INJECTION, SOLUTION INTRAMUSCULAR at 17:25

## 2020-03-14 RX ADMIN — METOPROLOL SUCCINATE SCH MG: 25 TABLET, EXTENDED RELEASE ORAL at 21:22

## 2020-03-14 RX ADMIN — HYDRALAZINE HYDROCHLORIDE PRN MG: 20 INJECTION INTRAMUSCULAR; INTRAVENOUS at 08:56

## 2020-03-14 RX ADMIN — MELOXICAM SCH: 15 TABLET ORAL at 08:10

## 2020-03-14 RX ADMIN — DILTIAZEM HYDROCHLORIDE SCH MG: 120 CAPSULE, COATED, EXTENDED RELEASE ORAL at 08:56

## 2020-03-14 RX ADMIN — HALOPERIDOL LACTATE SCH: 5 INJECTION, SOLUTION INTRAMUSCULAR at 04:32

## 2020-03-14 RX ADMIN — METHYLPREDNISOLONE SODIUM SUCCINATE SCH MG: 40 INJECTION, POWDER, FOR SOLUTION INTRAMUSCULAR; INTRAVENOUS at 11:22

## 2020-03-14 RX ADMIN — LEVALBUTEROL HYDROCHLORIDE SCH MG: 0.63 SOLUTION RESPIRATORY (INHALATION) at 14:00

## 2020-03-14 RX ADMIN — MESALAMINE SCH MG: 250 CAPSULE ORAL at 11:21

## 2020-03-14 RX ADMIN — FUROSEMIDE SCH MG: 10 INJECTION, SOLUTION INTRAMUSCULAR; INTRAVENOUS at 21:25

## 2020-03-14 RX ADMIN — CAPTOPRIL SCH MG: 25 TABLET ORAL at 11:22

## 2020-03-14 RX ADMIN — MESALAMINE SCH MG: 250 CAPSULE ORAL at 17:36

## 2020-03-14 RX ADMIN — OXYCODONE HYDROCHLORIDE AND ACETAMINOPHEN SCH MG: 500 TABLET ORAL at 08:56

## 2020-03-14 RX ADMIN — HALOPERIDOL LACTATE SCH: 5 INJECTION, SOLUTION INTRAMUSCULAR at 08:50

## 2020-03-14 RX ADMIN — MESALAMINE SCH MG: 250 CAPSULE ORAL at 21:25

## 2020-03-14 RX ADMIN — ATORVASTATIN CALCIUM SCH MG: 40 TABLET, FILM COATED ORAL at 21:24

## 2020-03-14 RX ADMIN — IPRATROPIUM BROMIDE AND ALBUTEROL SULFATE PRN ML: 2.5; .5 SOLUTION RESPIRATORY (INHALATION) at 09:44

## 2020-03-14 RX ADMIN — OSELTAMIVIR PHOSPHATE SCH MG: 75 CAPSULE ORAL at 21:25

## 2020-03-14 RX ADMIN — FLUTICASONE FUROATE, UMECLIDINIUM BROMIDE AND VILANTEROL TRIFENATATE SCH INHALER: 100; 62.5; 25 POWDER RESPIRATORY (INHALATION) at 14:22

## 2020-03-14 RX ADMIN — POTASSIUM CHLORIDE SCH MEQ: 750 TABLET, FILM COATED, EXTENDED RELEASE ORAL at 08:56

## 2020-03-14 RX ADMIN — LEVALBUTEROL HYDROCHLORIDE SCH MG: 0.63 SOLUTION RESPIRATORY (INHALATION) at 20:50

## 2020-03-14 RX ADMIN — OSELTAMIVIR PHOSPHATE SCH MG: 75 CAPSULE ORAL at 11:22

## 2020-03-14 RX ADMIN — METHYLPREDNISOLONE SODIUM SUCCINATE SCH MG: 125 INJECTION, POWDER, FOR SOLUTION INTRAMUSCULAR; INTRAVENOUS at 17:23

## 2020-03-14 RX ADMIN — HALOPERIDOL LACTATE SCH MG: 5 INJECTION, SOLUTION INTRAMUSCULAR at 21:26

## 2020-03-14 RX ADMIN — CEFEPIME HYDROCHLORIDE SCH MLS/HR: 1 INJECTION, SOLUTION INTRAVENOUS at 21:26

## 2020-03-14 RX ADMIN — METOPROLOL SUCCINATE SCH MG: 25 TABLET, EXTENDED RELEASE ORAL at 11:22

## 2020-03-14 RX ADMIN — DUTASTERIDE SCH: 0.5 CAPSULE, LIQUID FILLED ORAL at 08:10

## 2020-03-14 RX ADMIN — MESALAMINE SCH: 250 CAPSULE ORAL at 17:30

## 2020-03-14 RX ADMIN — FERROUS SULFATE TAB 325 MG (65 MG ELEMENTAL FE) SCH MG: 325 (65 FE) TAB at 11:22

## 2020-03-14 RX ADMIN — ENOXAPARIN SODIUM SCH MG: 40 INJECTION SUBCUTANEOUS at 11:22

## 2020-03-14 RX ADMIN — IPRATROPIUM BROMIDE SCH MG: 0.5 SOLUTION RESPIRATORY (INHALATION) at 14:00

## 2020-03-14 RX ADMIN — METHYLPREDNISOLONE SODIUM SUCCINATE SCH MG: 125 INJECTION, POWDER, FOR SOLUTION INTRAMUSCULAR; INTRAVENOUS at 21:40

## 2020-03-14 RX ADMIN — VANCOMYCIN HYDROCHLORIDE SCH MLS/HR: 1 INJECTION, POWDER, LYOPHILIZED, FOR SOLUTION INTRAVENOUS at 17:36

## 2020-03-14 RX ADMIN — DIGOXIN SCH MG: 0.12 TABLET ORAL at 16:29

## 2020-03-14 RX ADMIN — TAMSULOSIN HYDROCHLORIDE SCH MG: 0.4 CAPSULE ORAL at 21:24

## 2020-03-14 RX ADMIN — IPRATROPIUM BROMIDE SCH MG: 0.5 SOLUTION RESPIRATORY (INHALATION) at 20:50

## 2020-03-14 NOTE — PDOC PROGRESS REPORT
Subjective


Progress Note for:: 03/14/20


Subjective:: 


LUIS F VELA is a 86 year old male with past medical history significant for 

chronic A. fib, HTN, HLD, COPD, Crohn's disease who presented to ED with severe 

respiratory distress that awoke him from sleep.  Patient contracted a viral 

upper respiratory illness proximately 3 days prior to admission and this became 

worse gradually causing productive cough shortness of breath/ROBERTSON.  Patient does 

not use home oxygen prior to admission.  He was requiring BiPAP to maintain 

saturations on admission.  Given steroids, nebulizers, antibiotics on admission.

 Chest x-ray did not specifically show pneumonia the lungs had scattered 

congestion on exam.  Patient noted to be in A. fib with RVR on admission with 

rates in the 150s and is promptly came down with diltiazem drip with a rate of 

10mg/h.  Daughter is in charge of patient's medications and states he has not 

missed any doses including his digoxin and diltiazem. 





3/14/2020.  No acute events overnight, patient complaining of congestion, dry 

mouth, and being very anxious, otherwise denies any fever, chills, nausea, vo

miting, diarrhea, constipation or any urinary symptoms.


Reason For Visit: 


CAP,COPD EXACERBATION,ACUTE ON CHRONIC AFIB WITH








Physical Exam


Vital Signs: 


                                        











Temp Pulse Resp BP Pulse Ox


 


 97.2 F   91   20   166/62 H  96 


 


 03/14/20 11:30  03/14/20 11:30  03/14/20 11:30  03/14/20 11:30  03/14/20 11:30








                                 Intake & Output











 03/13/20 03/14/20 03/15/20





 06:59 06:59 06:59


 


Intake Total 1497 1306 


 


Output Total 1075 1725 


 


Balance 422 -419 


 


Weight 67.7 kg 68.7 kg 











General appearance: PRESENT: mild distress


Head exam: PRESENT: atraumatic, normocephalic


Respiratory exam: PRESENT: accessory muscle use, crackles, prolonged expiratory 

phas, symmetrical, wheezes.  ABSENT: rales, rhonchi


Cardiovascular exam: PRESENT: RRR.  ABSENT: diastolic murmur, rubs, systolic 

murmur


GI/Abdominal exam: PRESENT: normal bowel sounds, soft.  ABSENT: distended, 

guarding, mass, organolmegaly, rebound, tenderness


Neurological exam: PRESENT: alert, awake, oriented to person, oriented to place,

CN II-XII grossly intact.  ABSENT: motor sensory deficit





Results


Laboratory Results: 


                                        





                                 03/13/20 05:53 





                                 03/13/20 05:53 





                                        











  03/04/20 03/04/20 03/05/20





  21:51 21:51 00:20


 


Creatine Kinase  128  


 


Troponin I   0.057  0.152


 


NT-Pro-B Natriuret Pep   5170 H 














  03/07/20





  13:37


 


Creatine Kinase 


 


Troponin I 


 


NT-Pro-B Natriuret Pep  5250 H











Impressions: 


                                        





Chest X-Ray  03/07/20 00:00


IMPRESSION:  Improved vascular congestion.


 








Modified Barium Swallow  03/12/20 00:00


IMPRESSION:  TRACE LARYNGEAL PENETRATION WITHOUT ASPIRATION. PLEASE SEE SPEECH 

PATHOLOGIST REPORT FOR OTHER FINDINGS AND RECOMMENDATIONS.


 














Assessment and Plan





- Diagnosis


(1) Acute respiratory failure with hypoxia


Is this a current diagnosis for this admission?: Yes   


Plan: 


No significant improvement since admission. SPO2 96% on 3.5 L NC.





Likely due to underlying upper respiratory function, atypical pneumonia, COPD 

and CHF exacerbation.





Pulmonary toileting, duo nebs, BiPAP, LABA, ICS, LABA, empiric IV antibiotics, 

incentive spirometry, flutter valve.








(2) Atrial fibrillation with RVR


Is this a current diagnosis for this admission?: Yes   


Plan: 


History of chronic persistent atrial fibrillation.


Worsening likely due to respiratory failure and hypoxia


Initially was started on IV diltiazem drip and transition to p.o.


Continue diltiazem, digoxin, beta-blockers.  Monitor dig level.


Outpatient PCP and cardiology follow-up.








(3) Atypical pneumonia


Is this a current diagnosis for this admission?: Yes   


Plan: 


Patient afebrile however still hypoxic with mild neutrophilic leukocytosis.


Sputum culture on admission positive for strep to coccus species.  


Likely bacterial infection followed viral illness


Received complete dose of ceftriaxone/doxycycline (azithromycin cannot be used 

due to very long QTC on admission)


Oxygen as needed with goal 89 to 94%


Bronchial hygiene


Given the fact that patient not improving still hypoxic with significant 

neutrophilic leukocytosis patient likely having recurrent pneumonia or 

healthcare associated pneumonia.  





We will restart on empiric IV antibiotics.


Obtain new sputum and blood culture.








(4) CHF exacerbation


Qualifiers: 


   Heart failure type: systolic   Qualified Code(s): I50.23 - Acute on chronic 

systolic (congestive) heart failure   


Is this a current diagnosis for this admission?: Yes   


Plan: 


Acute systolic heart failure


BNP elevated to over 5000 on admission.


Likely due to hospital-acquired pneumonia complicated by underlying A. fib RVR. 




3/12/2020 2D echo LVEF 50 to 55%.  Mild diastolic dysfunction.  RSVP 60 mmHg.





Cardiac diet, strict in and out, fluid restriction, diuretics, ACE, beta-

blockers, digoxin.








(5) COPD exacerbation


Is this a current diagnosis for this admission?: Yes   


Plan: 


History of non-oxygen dependent COPD.


Former smoker.


Plan as per #1.








(6) Crohn's disease


Is this a current diagnosis for this admission?: No   


Plan: 


Does not appear to be acutely exacerbated.


H&H stable.  Denies any melena or hematochezia.


Takes Pentasa at home


Resume home meds.


Outpatient PCP and gastroenterology follow-up.








(7) Dysphagia


Is this a current diagnosis for this admission?: Yes   


Plan: 


Status post modified barium swallow study.


Mechanical soft diet recommended.








(8) HTN (hypertension)


Is this a current diagnosis for this admission?: Yes   


Plan: 


Uncontrolled.





Home medications are: captopril 50 mg p.o. twice daily, diltiazem 120 mg p.o. 

daily, Lasix 20 mg p.o. twice daily, metoprolol 25 mg p.o. daily.





Switch captopril to lisinopril for ease of administration.


Continue diltiazem, Lasix, metoprolol.  PRN hydralazine and metoprolol.


Adjust meds as needed.  Outpatient PCP follow-up.








(9) Influenza A


Is this a current diagnosis for this admission?: Yes   


Plan: 


Patient tested positive for Haemophilus influenza type b on admission.


Unfortunately was not started on Tamiflu until 3/10/2020.


Tamiflu day 3/5.  








- Plan Summary


Summary: 


3/6/2020


Temperature 97.9 pulse 76 blood pressure 144/58 O2 sat 94% on 4 L


We will have nursing try to wean him back down to 3 L


Patient has no history of thyroid disease will recheck free T3 and free T4


Medications include Lasix 10 mg IV every 12 hours Solu-Medrol 40 mg IV every 12 

hours Vibramycin 100 mg every 12 hours and Rocephin 2 g daily


Plus other incidental medications


Patient states he is breathing slightly better than on admission.  Patient lives

at home with his daughter and son-in-law


WBCs on admission were 18,000 today 13,000


Chemistry is grossly normal


1 blood culture grew out Streptococcus species


Will consult discharge planning to assess patient's needs at time of discharge





3/7/2020


Patient is afebrile 97 7 pulse is still between 90 and 100 blood pressure is 

elevated 186/57, although earlier this morning it was 144/58


I am also going to increase his Apresoline to 20 mg as needed


Patient is on captopril 50 mg every 12 hours, Cardizem 60 mg every 8 hours, 

Lasix was increased to 20 mg IV every 12 hours, Solu-Medrol was increased to 40 

mg every 8 hours.


Patient was also on Toprol XL 25 mg daily prior to admission and I will resume t

his


Patient's oxygen saturation is between 91 and 97% on 2 L nasal cannula





Patient is on IV Vibramycin as well as Rocephin, sensitivities appear to be 

appropriate for these 2 drugs


Patient is making slow progress


Patient lives at home with family.  Patient states he was not on oxygen prior to

coming in


Chest x-ray does showed pneumonia and patient also was flu A+





3/8/2020


Patient is more agitated when he is awake although you can speak to him and he 

will calm down.  Patient pulled out his Ball catheter yesterday and caused a 

little bit of trauma resulting in hematuria which has cleared on its own.


Patient does much better when on the BiPAP but this does require Ativan


Overall blood pressures are starting to come down


ABGs done early this morning are actually fairly normal


White count on admission was 18,000 and is now now down to 7.8


Lactic acid remains normal





Had a long conversation yesterday with the patient's daughter, Danita, I 

explained to her how sick her father was and that this could be a life-

threatening illness.  Patient told me yesterday that "he would see me in [facility]".  


I think we should continue on the same course of treatment since he appears to 

be gradually improving


Portable chest x-ray done yesterday is actually improved from admission





3/9/2020


This morning his temp is 97 6 pulse is anywhere from  blood pressure 

148/57 respiratory respiratory rates 25-37 on BiPAP he is between 90 and 98% 

saturation


Patient had a blood gas yesterday which was not all that terribly abnormal


Chemistry panel appears stable


1 blood culture cultures appears to be growing out a strep mitis , patient 

currently on IV doxycycline and IV Rocephin





Patient is requiring IV Haldol to keep him in the bed.  This morning I talked to

respiratory therapy about trying to switch him over to nasal cannula as he 

appears to be more anxious and agitated with the mask on than without it.  We 

will see how that goes





Patient's mental baseline was probably of some mild dementia certainly not is 

confused and altered as he is now.  I think patient's pulmonary status prior to 

admission was also marginal.





Patient's chest x-ray from 2 days ago really still shows just a cardiomegaly 

which is impressive but no acute pulmonary problems.


I will consult cardiology today to see if they have any insight

## 2020-03-14 NOTE — RADIOLOGY REPORT (SQ)
EXAM DESCRIPTION:  CHEST SINGLE VIEW



COMPLETED DATE/TIME:  3/14/2020 2:03 pm



REASON FOR STUDY:  hypoxia



COMPARISON:  3/7/2020



FINDINGS:  One view chest AP portable upright.

Patient is rotated to the right.

Allowing for this, stable cardiomediastinal silhouette with cardiomegaly.

There is vascular congestion present on today's study.

No pneumothorax.  No large pleural effusion.



TECHNICAL DOCUMENTATION:  JOB ID:  [id number]



Reading location - IP/workstation name: [email or web address]

## 2020-03-15 LAB
ABSOLUTE LYMPHOCYTES# (MANUAL): 0.3 10^3/UL (ref 0.5–4.7)
ABSOLUTE MONOCYTES # (MANUAL): 0.4 10^3/UL (ref 0.1–1.4)
ADD MANUAL DIFF: YES
ANION GAP SERPL CALC-SCNC: 5 MMOL/L (ref 5–19)
ANISOCYTOSIS BLD QL SMEAR: (no result)
BASOPHILS NFR BLD MANUAL: 0 % (ref 0–2)
BUN SERPL-MCNC: 28 MG/DL (ref 7–20)
BURR CELLS BLD QL SMEAR: (no result)
CALCIUM: 7.9 MG/DL (ref 8.4–10.2)
CHLORIDE SERPL-SCNC: 90 MMOL/L (ref 98–107)
CO2 SERPL-SCNC: 30 MMOL/L (ref 22–30)
DIGOXIN SERPL-MCNC: 0.76 NG/ML (ref 0.8–2)
EOSINOPHIL NFR BLD MANUAL: 2 % (ref 0–6)
ERYTHROCYTE [DISTWIDTH] IN BLOOD BY AUTOMATED COUNT: 15.6 % (ref 11.5–14)
GLUCOSE SERPL-MCNC: 120 MG/DL (ref 75–110)
HCT VFR BLD CALC: 32 % (ref 37.9–51)
HGB BLD-MCNC: 11.2 G/DL (ref 13.5–17)
MCH RBC QN AUTO: 28.5 PG (ref 27–33.4)
MCHC RBC AUTO-ENTMCNC: 35 G/DL (ref 32–36)
MCV RBC AUTO: 82 FL (ref 80–97)
MONOCYTES % (MANUAL): 3 % (ref 3–13)
NEUTS BAND NFR BLD MANUAL: 4 % (ref 3–5)
PLATELET # BLD: 210 10^3/UL (ref 150–450)
PLATELET COMMENT: ADEQUATE
POLYCHROMASIA BLD QL SMEAR: (no result)
POTASSIUM SERPL-SCNC: 4.1 MMOL/L (ref 3.6–5)
RBC # BLD AUTO: 3.92 10^6/UL (ref 4.35–5.55)
SEGMENTED NEUTROPHILS % (MAN): 89 % (ref 42–78)
TOTAL CELLS COUNTED BLD: 100
VARIANT LYMPHS NFR BLD MANUAL: 2 % (ref 13–45)
WBC # BLD AUTO: 12.6 10^3/UL (ref 4–10.5)

## 2020-03-15 RX ADMIN — CEFEPIME HYDROCHLORIDE SCH MLS/HR: 1 INJECTION, SOLUTION INTRAVENOUS at 21:33

## 2020-03-15 RX ADMIN — METOPROLOL SUCCINATE SCH MG: 25 TABLET, EXTENDED RELEASE ORAL at 09:41

## 2020-03-15 RX ADMIN — LEVALBUTEROL HYDROCHLORIDE SCH MG: 0.63 SOLUTION RESPIRATORY (INHALATION) at 14:04

## 2020-03-15 RX ADMIN — IPRATROPIUM BROMIDE SCH MG: 0.5 SOLUTION RESPIRATORY (INHALATION) at 08:42

## 2020-03-15 RX ADMIN — METHYLPREDNISOLONE SODIUM SUCCINATE SCH MG: 125 INJECTION, POWDER, FOR SOLUTION INTRAMUSCULAR; INTRAVENOUS at 15:58

## 2020-03-15 RX ADMIN — VANCOMYCIN HYDROCHLORIDE SCH MLS/HR: 1 INJECTION, POWDER, LYOPHILIZED, FOR SOLUTION INTRAVENOUS at 18:32

## 2020-03-15 RX ADMIN — VANCOMYCIN HYDROCHLORIDE SCH MLS/HR: 1 INJECTION, POWDER, LYOPHILIZED, FOR SOLUTION INTRAVENOUS at 06:09

## 2020-03-15 RX ADMIN — POTASSIUM CHLORIDE SCH MEQ: 750 TABLET, FILM COATED, EXTENDED RELEASE ORAL at 09:41

## 2020-03-15 RX ADMIN — FUROSEMIDE SCH MG: 10 INJECTION, SOLUTION INTRAMUSCULAR; INTRAVENOUS at 21:34

## 2020-03-15 RX ADMIN — FLUTICASONE FUROATE, UMECLIDINIUM BROMIDE AND VILANTEROL TRIFENATATE SCH INH: 100; 62.5; 25 POWDER RESPIRATORY (INHALATION) at 09:42

## 2020-03-15 RX ADMIN — ENOXAPARIN SODIUM SCH MG: 40 INJECTION SUBCUTANEOUS at 09:43

## 2020-03-15 RX ADMIN — METOPROLOL SUCCINATE SCH MG: 25 TABLET, EXTENDED RELEASE ORAL at 21:35

## 2020-03-15 RX ADMIN — IPRATROPIUM BROMIDE SCH MG: 0.5 SOLUTION RESPIRATORY (INHALATION) at 14:04

## 2020-03-15 RX ADMIN — TAMSULOSIN HYDROCHLORIDE SCH MG: 0.4 CAPSULE ORAL at 21:35

## 2020-03-15 RX ADMIN — MESALAMINE SCH MG: 250 CAPSULE ORAL at 15:58

## 2020-03-15 RX ADMIN — CEFEPIME HYDROCHLORIDE SCH MLS/HR: 1 INJECTION, SOLUTION INTRAVENOUS at 09:43

## 2020-03-15 RX ADMIN — DUTASTERIDE SCH MG: 0.5 CAPSULE, LIQUID FILLED ORAL at 09:42

## 2020-03-15 RX ADMIN — FERROUS SULFATE TAB 325 MG (65 MG ELEMENTAL FE) SCH: 325 (65 FE) TAB at 14:11

## 2020-03-15 RX ADMIN — DILTIAZEM HYDROCHLORIDE SCH MG: 120 CAPSULE, COATED, EXTENDED RELEASE ORAL at 09:41

## 2020-03-15 RX ADMIN — OXYCODONE HYDROCHLORIDE AND ACETAMINOPHEN SCH MG: 500 TABLET ORAL at 09:40

## 2020-03-15 RX ADMIN — METHYLPREDNISOLONE SODIUM SUCCINATE SCH MG: 125 INJECTION, POWDER, FOR SOLUTION INTRAMUSCULAR; INTRAVENOUS at 21:34

## 2020-03-15 RX ADMIN — MESALAMINE SCH MG: 250 CAPSULE ORAL at 09:43

## 2020-03-15 RX ADMIN — HALOPERIDOL LACTATE SCH: 5 INJECTION, SOLUTION INTRAMUSCULAR at 04:05

## 2020-03-15 RX ADMIN — LISINOPRIL SCH MG: 10 TABLET ORAL at 09:41

## 2020-03-15 RX ADMIN — MESALAMINE SCH MG: 250 CAPSULE ORAL at 21:36

## 2020-03-15 RX ADMIN — ATORVASTATIN CALCIUM SCH MG: 40 TABLET, FILM COATED ORAL at 21:35

## 2020-03-15 RX ADMIN — LEVALBUTEROL HYDROCHLORIDE SCH MG: 0.63 SOLUTION RESPIRATORY (INHALATION) at 08:42

## 2020-03-15 RX ADMIN — DIGOXIN SCH MG: 0.12 TABLET ORAL at 15:58

## 2020-03-15 RX ADMIN — FUROSEMIDE SCH MG: 10 INJECTION, SOLUTION INTRAMUSCULAR; INTRAVENOUS at 09:40

## 2020-03-15 RX ADMIN — METHYLPREDNISOLONE SODIUM SUCCINATE SCH MG: 125 INJECTION, POWDER, FOR SOLUTION INTRAMUSCULAR; INTRAVENOUS at 06:09

## 2020-03-15 RX ADMIN — OSELTAMIVIR PHOSPHATE SCH MG: 75 CAPSULE ORAL at 09:42

## 2020-03-15 RX ADMIN — IPRATROPIUM BROMIDE SCH MG: 0.5 SOLUTION RESPIRATORY (INHALATION) at 20:08

## 2020-03-15 RX ADMIN — MESALAMINE SCH MG: 250 CAPSULE ORAL at 18:33

## 2020-03-15 RX ADMIN — LEVALBUTEROL HYDROCHLORIDE SCH MG: 0.63 SOLUTION RESPIRATORY (INHALATION) at 20:09

## 2020-03-15 NOTE — PDOC PROGRESS REPORT
Subjective


Progress Note for:: 03/15/20


Subjective:: 


LUIS F VELA is a 86 year old male with past medical history significant for 

chronic A. fib, HTN, HLD, COPD, Crohn's disease who presented to ED with severe 

respiratory distress that awoke him from sleep.  Patient contracted a viral 

upper respiratory illness proximately 3 days prior to admission and this became 

worse gradually causing productive cough shortness of breath/ROBERTSON.  Patient does 

not use home oxygen prior to admission.  He was requiring BiPAP to maintain 

saturations on admission.  Given steroids, nebulizers, antibiotics on admission.

 Chest x-ray did not specifically show pneumonia the lungs had scattered 

congestion on exam.  Patient noted to be in A. fib with RVR on admission with 

rates in the 150s and is promptly came down with diltiazem drip with a rate of 

10mg/h.  Daughter is in charge of patient's medications and states he has not 

missed any doses including his digoxin and diltiazem. 





3/14/2020.  No acute events overnight, patient complaining of congestion, dry 

mouth, and being very anxious, otherwise denies any fever, chills, nausea, vo

miting, diarrhea, constipation or any urinary symptoms.





3/15/2020.  No acute events overnight.  Patient comfortably resting in bed no 

apparent distress, denies being anxious, denies any fever, chills, nausea, 

vomiting, diarrhea, constipation or any urinary symptoms.  Alert and oriented 

x2.  P.o. tolerant.


Reason For Visit: 


CAP,COPD EXACERBATION,ACUTE ON CHRONIC AFIB WITH








Physical Exam


Vital Signs: 


                                        











Temp Pulse Resp BP Pulse Ox


 


 97.5 F   70   15   147/57 H  98 


 


 03/15/20 08:12  03/15/20 08:42  03/15/20 08:42  03/15/20 08:12  03/15/20 08:42








                                 Intake & Output











 03/14/20 03/15/20 03/16/20





 06:59 06:59 06:59


 


Intake Total 1306 1426 250


 


Output Total 1725 1300 


 


Balance -419 126 250


 


Weight 68.7 kg 60.9 kg 











General appearance: PRESENT: no acute distress, well-developed, well-nourished


Head exam: PRESENT: atraumatic, normocephalic


Respiratory exam: PRESENT: crackles, prolonged expiratory phas, wheezes.  ABS

ENT: rales, rhonchi


Cardiovascular exam: PRESENT: RRR.  ABSENT: diastolic murmur, rubs, systolic 

murmur


GI/Abdominal exam: PRESENT: normal bowel sounds, soft.  ABSENT: distended, 

guarding, mass, organolmegaly, rebound, tenderness


Neurological exam: PRESENT: alert, awake, oriented to person, oriented to place,

CN II-XII grossly intact.  ABSENT: motor sensory deficit





Results


Laboratory Results: 


                                        





                                 03/15/20 05:16 





                                 03/15/20 05:16 





                                        











  03/15/20 03/15/20





  05:16 05:16


 


WBC   12.6 H


 


RBC   3.92 L


 


Hgb   11.2 L


 


Hct   32.0 L


 


MCV   82


 


MCH   28.5


 


MCHC   35.0


 


RDW   15.6 H


 


Plt Count   210


 


Seg Neutrophils %   Not Reportable


 


Sodium  124.9 L 


 


Potassium  4.1 


 


Chloride  90 L 


 


Carbon Dioxide  30 


 


Anion Gap  5 


 


BUN  28 H 


 


Creatinine  0.46 L 


 


Est GFR (African Amer)  > 60 


 


Glucose  120 H 


 


Calcium  7.9 L 


 


Magnesium  2.4 H 








                                        











  03/04/20 03/04/20 03/05/20





  21:51 21:51 00:20


 


Creatine Kinase  128  


 


Troponin I   0.057  0.152


 


NT-Pro-B Natriuret Pep   5170 H 














  03/07/20





  13:37


 


Creatine Kinase 


 


Troponin I 


 


NT-Pro-B Natriuret Pep  5250 H











Impressions: 


                                        





Modified Barium Swallow  03/12/20 00:00


IMPRESSION:  TRACE LARYNGEAL PENETRATION WITHOUT ASPIRATION. PLEASE SEE SPEECH 

PATHOLOGIST REPORT FOR OTHER FINDINGS AND RECOMMENDATIONS.


 














Assessment and Plan





- Diagnosis


(1) Acute respiratory failure with hypoxia


Is this a current diagnosis for this admission?: Yes   


Plan: 


Moderate improvement compared to yesterday.  SPO2 WNL on 3 L nasal cannula.  





Likely due to underlying upper respiratory function, atypical pneumonia, COPD 

and CHF exacerbation.





Pulmonary toileting, duo nebs, BiPAP, LABA, ICS, LABA, empiric IV antibiotics, 

incentive spirometry, flutter valve.








(2) Atrial fibrillation with RVR


Is this a current diagnosis for this admission?: Yes   


Plan: 


History of chronic persistent atrial fibrillation.


Worsening likely due to respiratory failure and hypoxia


Initially was started on IV diltiazem drip and transition to p.o.


Continue diltiazem, digoxin, beta-blockers.  Monitor dig level.


Outpatient PCP and cardiology follow-up.








(3) Atypical pneumonia


Is this a current diagnosis for this admission?: Yes   


Plan: 


Patient afebrile however still hypoxic with mild neutrophilic leukocytosis.


Sputum culture on admission positive for Streptococcus species.


Likely bacterial infection followed viral illness


Received complete dose of ceftriaxone/doxycycline (azithromycin cannot be used 

due to very long QTC on admission)


Oxygen as needed with goal 89 to 94%


Bronchial hygiene


Given the fact that patient not improving still hypoxic with significant 

neutrophilic leukocytosis patient likely having recurrent pneumonia or 

healthcare associated pneumonia.  





Day 2 empiric IV antibiotics.


Day 2 IV vancomycin.


Day 2 IV cefepime.  


Obtain new sputum and blood culture.








(4) CHF exacerbation


Qualifiers: 


   Heart failure type: systolic   Qualified Code(s): I50.23 - Acute on chronic 

systolic (congestive) heart failure   


Is this a current diagnosis for this admission?: Yes   


Plan: 


Acute systolic heart failure


BNP elevated to over 5000 on admission.


Likely due to hospital-acquired pneumonia complicated by underlying A. fib RVR. 




3/12/2020 2D echo LVEF 50 to 55%.  Mild diastolic dysfunction.  RSVP 60 mmHg.





Cardiac diet, strict in and out, fluid restriction, diuretics, ACE, beta-

blockers, digoxin.








(5) COPD exacerbation


Is this a current diagnosis for this admission?: Yes   


Plan: 


History of non-oxygen dependent COPD.


Former smoker.


Plan as per #1.








(6) Crohn's disease


Is this a current diagnosis for this admission?: No   


Plan: 


Does not appear to be acutely exacerbated.


H&H stable.  Denies any melena or hematochezia.


Takes Pentasa at home


Resume home meds.


Outpatient PCP and gastroenterology follow-up.








(7) Dysphagia


Is this a current diagnosis for this admission?: Yes   


Plan: 


Status post modified barium swallow study.


Mechanical soft diet recommended.








(8) HTN (hypertension)


Is this a current diagnosis for this admission?: Yes   


Plan: 


Uncontrolled.





Home medications are: captopril 50 mg p.o. twice daily, diltiazem 120 mg p.o. 

daily, Lasix 20 mg p.o. twice daily, metoprolol 25 mg p.o. daily.





Switch captopril to lisinopril for ease of administration.


Continue diltiazem, Lasix, metoprolol.  PRN hydralazine and metoprolol.


Adjust meds as needed.  Outpatient PCP follow-up.








(9) Influenza A


Is this a current diagnosis for this admission?: Yes   


Plan: 


Patient tested positive for Haemophilus influenza type b on admission.


Unfortunately was not started on Tamiflu until 3/10/2020.


Completed a course of Tamiflu.








- Plan Summary


Summary: 


3/6/2020


Temperature 97.9 pulse 76 blood pressure 144/58 O2 sat 94% on 4 L


We will have nursing try to wean him back down to 3 L


Patient has no history of thyroid disease will recheck free T3 and free T4


Medications include Lasix 10 mg IV every 12 hours Solu-Medrol 40 mg IV every 12 

hours Vibramycin 100 mg every 12 hours and Rocephin 2 g daily


Plus other incidental medications


Patient states he is breathing slightly better than on admission.  Patient lives

at home with his daughter and son-in-law


WBCs on admission were 18,000 today 13,000


Chemistry is grossly normal


1 blood culture grew out Streptococcus species


Will consult discharge planning to assess patient's needs at time of discharge





3/7/2020


Patient is afebrile 97 7 pulse is still between 90 and 100 blood pressure is 

elevated 186/57, although earlier this morning it was 144/58


I am also going to increase his Apresoline to 20 mg as needed


Patient is on captopril 50 mg every 12 hours, Cardizem 60 mg every 8 hours, 

Lasix was increased to 20 mg IV every 12 hours, Solu-Medrol was increased to 40 

mg every 8 hours.


Patient was also on Toprol XL 25 mg daily prior to admission and I will resume 

this


Patient's oxygen saturation is between 91 and 97% on 2 L nasal cannula





Patient is on IV Vibramycin as well as Rocephin, sensitivities appear to be 

appropriate for these 2 drugs


Patient is making slow progress


Patient lives at home with family.  Patient states he was not on oxygen prior to

coming in


Chest x-ray does showed pneumonia and patient also was flu A+





3/8/2020


Patient is more agitated when he is awake although you can speak to him and he 

will calm down.  Patient pulled out his Ball catheter yesterday and caused a 

little bit of trauma resulting in hematuria which has cleared on its own.


Patient does much better when on the BiPAP but this does require Ativan


Overall blood pressures are starting to come down


ABGs done early this morning are actually fairly normal


White count on admission was 18,000 and is now now down to 7.8


Lactic acid remains normal





Had a long conversation yesterday with the patient's daughter, Danita, I 

explained to her how sick her father was and that this could be a life-

threatening illness.  Patient told me yesterday that "he would see me in 

[facility]".  


I think we should continue on the same course of treatment since he appears to 

be gradually improving


Portable chest x-ray done yesterday is actually improved from admission





3/9/2020


This morning his temp is 97 6 pulse is anywhere from  blood pressure 

148/57 respiratory respiratory rates 25-37 on BiPAP he is between 90 and 98% 

saturation


Patient had a blood gas yesterday which was not all that terribly abnormal


Chemistry panel appears stable


1 blood culture cultures appears to be growing out a strep mitis , patient 

currently on IV doxycycline and IV Rocephin





Patient is requiring IV Haldol to keep him in the bed.  This morning I talked to

respiratory therapy about trying to switch him over to nasal cannula as he appea

rs to be more anxious and agitated with the mask on than without it.  We will 

see how that goes





Patient's mental baseline was probably of some mild dementia certainly not is 

confused and altered as he is now.  I think patient's pulmonary status prior to 

admission was also marginal.





Patient's chest x-ray from 2 days ago really still shows just a cardiomegaly 

which is impressive but no acute pulmonary problems.


I will consult cardiology today to see if they have any insight

## 2020-03-16 LAB
ANION GAP SERPL CALC-SCNC: 6 MMOL/L (ref 5–19)
BUN SERPL-MCNC: 28 MG/DL (ref 7–20)
CALCIUM: 8.2 MG/DL (ref 8.4–10.2)
CHLORIDE SERPL-SCNC: 88 MMOL/L (ref 98–107)
CO2 SERPL-SCNC: 30 MMOL/L (ref 22–30)
ERYTHROCYTE [DISTWIDTH] IN BLOOD BY AUTOMATED COUNT: 15.6 % (ref 11.5–14)
GLUCOSE SERPL-MCNC: 124 MG/DL (ref 75–110)
HCT VFR BLD CALC: 32.7 % (ref 37.9–51)
HGB BLD-MCNC: 11.7 G/DL (ref 13.5–17)
MCH RBC QN AUTO: 28.8 PG (ref 27–33.4)
MCHC RBC AUTO-ENTMCNC: 35.9 G/DL (ref 32–36)
MCV RBC AUTO: 80 FL (ref 80–97)
PLATELET # BLD: 222 10^3/UL (ref 150–450)
POTASSIUM SERPL-SCNC: 4 MMOL/L (ref 3.6–5)
RBC # BLD AUTO: 4.07 10^6/UL (ref 4.35–5.55)
VANCOMYCIN,TROUGH: 8.9 UG/ML (ref 5–20)
WBC # BLD AUTO: 16.1 10^3/UL (ref 4–10.5)

## 2020-03-16 RX ADMIN — VANCOMYCIN HYDROCHLORIDE SCH MLS/HR: 1 INJECTION, POWDER, LYOPHILIZED, FOR SOLUTION INTRAVENOUS at 06:00

## 2020-03-16 RX ADMIN — CEFEPIME HYDROCHLORIDE SCH MLS/HR: 1 INJECTION, SOLUTION INTRAVENOUS at 10:15

## 2020-03-16 RX ADMIN — DUTASTERIDE SCH MG: 0.5 CAPSULE, LIQUID FILLED ORAL at 10:20

## 2020-03-16 RX ADMIN — METHYLPREDNISOLONE SODIUM SUCCINATE SCH MG: 125 INJECTION, POWDER, FOR SOLUTION INTRAMUSCULAR; INTRAVENOUS at 15:18

## 2020-03-16 RX ADMIN — CEFEPIME HYDROCHLORIDE SCH MLS/HR: 1 INJECTION, SOLUTION INTRAVENOUS at 22:16

## 2020-03-16 RX ADMIN — VANCOMYCIN HYDROCHLORIDE SCH MLS/HR: 1 INJECTION, POWDER, LYOPHILIZED, FOR SOLUTION INTRAVENOUS at 23:17

## 2020-03-16 RX ADMIN — FLUTICASONE FUROATE, UMECLIDINIUM BROMIDE AND VILANTEROL TRIFENATATE SCH INH: 100; 62.5; 25 POWDER RESPIRATORY (INHALATION) at 10:19

## 2020-03-16 RX ADMIN — LEVALBUTEROL HYDROCHLORIDE SCH MG: 0.63 SOLUTION RESPIRATORY (INHALATION) at 20:00

## 2020-03-16 RX ADMIN — FERROUS SULFATE TAB 325 MG (65 MG ELEMENTAL FE) SCH MG: 325 (65 FE) TAB at 18:17

## 2020-03-16 RX ADMIN — NYSTATIN SCH UNIT: 100000 SUSPENSION ORAL at 23:17

## 2020-03-16 RX ADMIN — IPRATROPIUM BROMIDE SCH MG: 0.5 SOLUTION RESPIRATORY (INHALATION) at 14:09

## 2020-03-16 RX ADMIN — MESALAMINE SCH MG: 250 CAPSULE ORAL at 10:21

## 2020-03-16 RX ADMIN — MESALAMINE SCH MG: 250 CAPSULE ORAL at 22:15

## 2020-03-16 RX ADMIN — METOPROLOL SUCCINATE SCH MG: 25 TABLET, EXTENDED RELEASE ORAL at 10:16

## 2020-03-16 RX ADMIN — ENOXAPARIN SODIUM SCH MG: 40 INJECTION SUBCUTANEOUS at 10:15

## 2020-03-16 RX ADMIN — POTASSIUM CHLORIDE SCH MEQ: 750 TABLET, FILM COATED, EXTENDED RELEASE ORAL at 10:13

## 2020-03-16 RX ADMIN — METOPROLOL SUCCINATE SCH MG: 25 TABLET, EXTENDED RELEASE ORAL at 22:15

## 2020-03-16 RX ADMIN — LISINOPRIL SCH MG: 10 TABLET ORAL at 10:14

## 2020-03-16 RX ADMIN — MESALAMINE SCH: 250 CAPSULE ORAL at 15:15

## 2020-03-16 RX ADMIN — DILTIAZEM HYDROCHLORIDE SCH MG: 120 CAPSULE, COATED, EXTENDED RELEASE ORAL at 10:14

## 2020-03-16 RX ADMIN — DIGOXIN SCH MG: 0.12 TABLET ORAL at 18:19

## 2020-03-16 RX ADMIN — MESALAMINE SCH MG: 250 CAPSULE ORAL at 18:18

## 2020-03-16 RX ADMIN — LEVALBUTEROL HYDROCHLORIDE SCH MG: 0.63 SOLUTION RESPIRATORY (INHALATION) at 14:09

## 2020-03-16 RX ADMIN — OXYCODONE HYDROCHLORIDE AND ACETAMINOPHEN SCH MG: 500 TABLET ORAL at 10:14

## 2020-03-16 RX ADMIN — TAMSULOSIN HYDROCHLORIDE SCH MG: 0.4 CAPSULE ORAL at 22:16

## 2020-03-16 RX ADMIN — NYSTATIN SCH UNIT: 100000 SUSPENSION ORAL at 20:23

## 2020-03-16 RX ADMIN — LEVALBUTEROL HYDROCHLORIDE SCH MG: 0.63 SOLUTION RESPIRATORY (INHALATION) at 07:55

## 2020-03-16 RX ADMIN — METHYLPREDNISOLONE SODIUM SUCCINATE SCH MG: 125 INJECTION, POWDER, FOR SOLUTION INTRAMUSCULAR; INTRAVENOUS at 06:00

## 2020-03-16 RX ADMIN — ATORVASTATIN CALCIUM SCH MG: 40 TABLET, FILM COATED ORAL at 22:16

## 2020-03-16 RX ADMIN — IPRATROPIUM BROMIDE SCH MG: 0.5 SOLUTION RESPIRATORY (INHALATION) at 20:00

## 2020-03-16 RX ADMIN — VANCOMYCIN HYDROCHLORIDE SCH MLS/HR: 1 INJECTION, POWDER, LYOPHILIZED, FOR SOLUTION INTRAVENOUS at 15:27

## 2020-03-16 RX ADMIN — METHYLPREDNISOLONE SODIUM SUCCINATE SCH MG: 125 INJECTION, POWDER, FOR SOLUTION INTRAMUSCULAR; INTRAVENOUS at 22:16

## 2020-03-16 RX ADMIN — IPRATROPIUM BROMIDE SCH MG: 0.5 SOLUTION RESPIRATORY (INHALATION) at 07:55

## 2020-03-16 NOTE — PDOC PROGRESS REPORT
Subjective


Progress Note for:: 03/16/20


Subjective:: 


LUIS F VELA is a 86 year old male with past medical history significant for 

chronic A. fib, HTN, HLD, COPD, Crohn's disease who presented to ED with severe 

respiratory distress that awoke him from sleep.  Patient contracted a viral 

upper respiratory illness proximately 3 days prior to admission and this became 

worse gradually causing productive cough shortness of breath/ROBERTSON.  Patient does 

not use home oxygen prior to admission.  He was requiring BiPAP to maintain 

saturations on admission.  Given steroids, nebulizers, antibiotics on admission.

 Chest x-ray did not specifically show pneumonia the lungs had scattered 

congestion on exam.  Patient noted to be in A. fib with RVR on admission with 

rates in the 150s and is promptly came down with diltiazem drip with a rate of 

10mg/h.  Daughter is in charge of patient's medications and states he has not 

missed any doses including his digoxin and diltiazem. 





3/14/2020.  No acute events overnight, patient complaining of congestion, dry 

mouth, and being very anxious, otherwise denies any fever, chills, nausea, vo

miting, diarrhea, constipation or any urinary symptoms.





3/15/2020.  No acute events overnight.  Patient comfortably resting in bed no 

apparent distress, denies being anxious, denies any fever, chills, nausea, 

vomiting, diarrhea, constipation or any urinary symptoms.  Alert and oriented 

x2.  P.o. tolerant.





3/16/2020.  No acute events overnight.  Significant improvement of respiratory 

symptoms, in no apparent distress, denies any fever, chills, nausea, vomiting, 

diarrhea, constipation or any urinary symptoms.  Pending placement.


Reason For Visit: 


CAP,COPD EXACERBATION,ACUTE ON CHRONIC AFIB WITH








Physical Exam


Vital Signs: 


                                        











Temp Pulse Resp BP Pulse Ox


 


 97.4 F   67   16   153/74 H  91 L


 


 03/16/20 07:24  03/16/20 07:55  03/16/20 07:55  03/16/20 07:24  03/16/20 07:55








                                 Intake & Output











 03/15/20 03/16/20 03/17/20





 06:59 06:59 06:59


 


Intake Total 1426 2086 


 


Output Total 1300 1625 


 


Balance 126 461 


 


Weight 60.9 kg 60.7 kg 











General appearance: PRESENT: no acute distress, well-developed, well-nourished


Head exam: PRESENT: atraumatic, normocephalic


Respiratory exam: PRESENT: clear to auscultation say.  ABSENT: rales, rhonchi, 

wheezes


Cardiovascular exam: PRESENT: RRR.  ABSENT: diastolic murmur, rubs, systolic 

murmur


GI/Abdominal exam: PRESENT: normal bowel sounds, soft.  ABSENT: distended, 

guarding, mass, organolmegaly, rebound, tenderness


Neurological exam: PRESENT: alert, awake, oriented to person, oriented to place,

oriented to time, oriented to situation, CN II-XII grossly intact.  ABSENT: 

motor sensory deficit





Results


Laboratory Results: 


                                        





                                 03/16/20 05:43 





                                 03/16/20 05:43 





                                        











  03/16/20 03/16/20





  05:43 05:43


 


WBC  16.1 H 


 


RBC  4.07 L 


 


Hgb  11.7 L 


 


Hct  32.7 L 


 


MCV  80 


 


MCH  28.8 


 


MCHC  35.9 


 


RDW  15.6 H 


 


Plt Count  222 


 


Sodium   123.9 L


 


Potassium   4.0


 


Chloride   88 L


 


Carbon Dioxide   30


 


Anion Gap   6


 


BUN   28 H


 


Creatinine   0.52


 


Est GFR (African Amer)   > 60


 


Glucose   124 H


 


Calcium   8.2 L








                                        











  03/04/20 03/04/20 03/05/20





  21:51 21:51 00:20


 


Creatine Kinase  128  


 


Troponin I   0.057  0.152


 


NT-Pro-B Natriuret Pep   5170 H 














  03/07/20





  13:37


 


Creatine Kinase 


 


Troponin I 


 


NT-Pro-B Natriuret Pep  5250 H











Impressions: 


                                        





Modified Barium Swallow  03/12/20 00:00


IMPRESSION:  TRACE LARYNGEAL PENETRATION WITHOUT ASPIRATION. PLEASE SEE SPEECH 

PATHOLOGIST REPORT FOR OTHER FINDINGS AND RECOMMENDATIONS.


 














Assessment and Plan





- Diagnosis


(1) Acute respiratory failure with hypoxia


Is this a current diagnosis for this admission?: Yes   


Plan: 


Moderate improvement compared to yesterday.  SPO2 WNL on 2 L nasal cannula.  





Likely due to underlying upper respiratory function, atypical pneumonia, COPD 

and CHF exacerbation.





Pulmonary toileting, duo nebs, BiPAP, LABA, ICS, LABA, empiric IV antibiotics, 

incentive spirometry, flutter valve.








(2) Atrial fibrillation with RVR


Is this a current diagnosis for this admission?: Yes   


Plan: 


History of chronic persistent atrial fibrillation.


Worsening likely due to respiratory failure and hypoxia


Initially was started on IV diltiazem drip and transition to p.o.


Continue diltiazem, digoxin, beta-blockers.  Monitor dig level.


Outpatient PCP and cardiology follow-up.








(3) Atypical pneumonia


Is this a current diagnosis for this admission?: Yes   


Plan: 


Patient afebrile however still hypoxic with mild neutrophilic leukocytosis.


Sputum culture on admission positive for Streptococcus species.


Likely bacterial infection followed viral illness


Received complete dose of ceftriaxone/doxycycline (azithromycin cannot be used 

due to very long QTC on admission)


Oxygen as needed with goal 89 to 94%


Bronchial hygiene


Given the fact that patient not improving still hypoxic with significant bubba

trophilic leukocytosis patient likely having recurrent pneumonia or healthcare 

associated pneumonia.  





Day 3 empiric IV antibiotics.


Day 3 IV vancomycin.


Day 3 IV cefepime.  


Obtain new sputum and blood culture.








(4) CHF exacerbation


Qualifiers: 


   Heart failure type: systolic   Qualified Code(s): I50.23 - Acute on chronic 

systolic (congestive) heart failure   


Is this a current diagnosis for this admission?: Yes   


Plan: 


Acute systolic heart failure


BNP elevated to over 5000 on admission.


Likely due to hospital-acquired pneumonia complicated by underlying A. fib RVR. 




3/12/2020 2D echo LVEF 50 to 55%.  Mild diastolic dysfunction.  RSVP 60 mmHg.





Cardiac diet, strict in and out, fluid restriction, diuretics, ACE, beta-

blockers, digoxin.








(5) COPD exacerbation


Is this a current diagnosis for this admission?: Yes   


Plan: 


History of non-oxygen dependent COPD.


Former smoker.


Plan as per #1.








(6) Crohn's disease


Is this a current diagnosis for this admission?: No   


Plan: 


Does not appear to be acutely exacerbated.


H&H stable.  Denies any melena or hematochezia.


Takes Pentasa at home


Resume home meds.


Outpatient PCP and gastroenterology follow-up.








(7) Dysphagia


Is this a current diagnosis for this admission?: Yes   


Plan: 


Status post modified barium swallow study.


Mechanical soft diet recommended.








(8) HTN (hypertension)


Is this a current diagnosis for this admission?: Yes   


Plan: 


Uncontrolled.





Home medications are: captopril 50 mg p.o. twice daily, diltiazem 120 mg p.o. 

daily, Lasix 20 mg p.o. twice daily, metoprolol 25 mg p.o. daily.





Switch captopril to lisinopril for ease of administration.


Continue diltiazem, Lasix, metoprolol.  PRN hydralazine and metoprolol.


Adjust meds as needed.  Outpatient PCP follow-up.








(9) Influenza A


Is this a current diagnosis for this admission?: Yes   


Plan: 


Patient tested positive for Haemophilus influenza type b on admission.


Unfortunately was not started on Tamiflu until 3/10/2020.


Completed a course of Tamiflu.








- Plan Summary


Summary: 


3/6/2020


Temperature 97.9 pulse 76 blood pressure 144/58 O2 sat 94% on 4 L


We will have nursing try to wean him back down to 3 L


Patient has no history of thyroid disease will recheck free T3 and free T4


Medications include Lasix 10 mg IV every 12 hours Solu-Medrol 40 mg IV every 12 

hours Vibramycin 100 mg every 12 hours and Rocephin 2 g daily


Plus other incidental medications


Patient states he is breathing slightly better than on admission.  Patient lives

at home with his daughter and son-in-law


WBCs on admission were 18,000 today 13,000


Chemistry is grossly normal


1 blood culture grew out Streptococcus species


Will consult discharge planning to assess patient's needs at time of discharge





3/7/2020


Patient is afebrile 97 7 pulse is still between 90 and 100 blood pressure is 

elevated 186/57, although earlier this morning it was 144/58


I am also going to increase his Apresoline to 20 mg as needed


Patient is on captopril 50 mg every 12 hours, Cardizem 60 mg every 8 hours, 

Lasix was increased to 20 mg IV every 12 hours, Solu-Medrol was increased to 40 

mg every 8 hours.


Patient was also on Toprol XL 25 mg daily prior to admission and I will resume 

this


Patient's oxygen saturation is between 91 and 97% on 2 L nasal cannula





Patient is on IV Vibramycin as well as Rocephin, sensitivities appear to be 

appropriate for these 2 drugs


Patient is making slow progress


Patient lives at home with family.  Patient states he was not on oxygen prior to

coming in


Chest x-ray does showed pneumonia and patient also was flu A+





3/8/2020


Patient is more agitated when he is awake although you can speak to him and he 

will calm down.  Patient pulled out his Ball catheter yesterday and caused a 

little bit of trauma resulting in hematuria which has cleared on its own.


Patient does much better when on the BiPAP but this does require Ativan


Overall blood pressures are starting to come down


ABGs done early this morning are actually fairly normal


White count on admission was 18,000 and is now now down to 7.8


Lactic acid remains normal





Had a long conversation yesterday with the patient's daughter, Danita, I 

explained to her how sick her father was and that this could be a life-

threatening illness.  Patient told me yesterday that "he would see me in 

[facility]".  


I think we should continue on the same course of treatment since he appears to 

be gradually improving


Portable chest x-ray done yesterday is actually improved from admission





3/9/2020


This morning his temp is 97 6 pulse is anywhere from  blood pressure 

148/57 respiratory respiratory rates 25-37 on BiPAP he is between 90 and 98% 

saturation


Patient had a blood gas yesterday which was not all that terribly abnormal


Chemistry panel appears stable


1 blood culture cultures appears to be growing out a strep mitis , patient 

currently on IV doxycycline and IV Rocephin





Patient is requiring IV Haldol to keep him in the bed.  This morning I talked to

respiratory therapy about trying to switch him over to nasal cannula as he 

appears to be more anxious and agitated with the mask on than without it.  We 

will see how that goes





Patient's mental baseline was probably of some mild dementia certainly not is 

confused and altered as he is now.  I think patient's pulmonary status prior to 

admission was also marginal.





Patient's chest x-ray from 2 days ago really still shows just a cardiomegaly 

which is impressive but no acute pulmonary problems.


I will consult cardiology today to see if they have any insight

## 2020-03-17 LAB
ABSOLUTE LYMPHOCYTES# (MANUAL): 0.4 10^3/UL (ref 0.5–4.7)
ABSOLUTE MONOCYTES # (MANUAL): 0.2 10^3/UL (ref 0.1–1.4)
ADD MANUAL DIFF: YES
ANION GAP SERPL CALC-SCNC: 5 MMOL/L (ref 5–19)
ANISOCYTOSIS BLD QL SMEAR: (no result)
BASOPHILS NFR BLD MANUAL: 0 % (ref 0–2)
BUN SERPL-MCNC: 27 MG/DL (ref 7–20)
BURR CELLS BLD QL SMEAR: (no result)
CALCIUM: 8 MG/DL (ref 8.4–10.2)
CHLORIDE SERPL-SCNC: 87 MMOL/L (ref 98–107)
CO2 SERPL-SCNC: 27 MMOL/L (ref 22–30)
CREAT UR-MCNC: 65.4 MG/DL (ref 22–328)
EOSINOPHIL NFR BLD MANUAL: 0 % (ref 0–6)
ERYTHROCYTE [DISTWIDTH] IN BLOOD BY AUTOMATED COUNT: 15.3 % (ref 11.5–14)
GLUCOSE SERPL-MCNC: 116 MG/DL (ref 75–110)
HCT VFR BLD CALC: 32.2 % (ref 37.9–51)
HGB BLD-MCNC: 11.6 G/DL (ref 13.5–17)
MCH RBC QN AUTO: 28.9 PG (ref 27–33.4)
MCHC RBC AUTO-ENTMCNC: 35.9 G/DL (ref 32–36)
MCV RBC AUTO: 80 FL (ref 80–97)
MONOCYTES % (MANUAL): 1 % (ref 3–13)
OSMOLALITY,URINE: 768 MOSM/KG (ref 300–900)
OVALOCYTES BLD QL SMEAR: SLIGHT
PLATELET # BLD: 233 10^3/UL (ref 150–450)
PLATELET COMMENT: ADEQUATE
POIKILOCYTOSIS BLD QL SMEAR: (no result)
POTASSIUM SERPL-SCNC: 4.3 MMOL/L (ref 3.6–5)
RBC # BLD AUTO: 4.01 10^6/UL (ref 4.35–5.55)
SCHISTOCYTES BLD QL SMEAR: (no result)
SEGMENTED NEUTROPHILS % (MAN): 97 % (ref 42–78)
TOTAL CELLS COUNTED BLD: 100
TOXIC GRANULES BLD QL SMEAR: (no result)
URINE SODIUM: < 5 MMOL/L (ref 30–90)
VANCOMYCIN,TROUGH: 18.4 UG/ML (ref 5–20)
VARIANT LYMPHS NFR BLD MANUAL: 2 % (ref 13–45)
WBC # BLD AUTO: 18 10^3/UL (ref 4–10.5)
WBC TOXIC VACUOLES BLD QL SMEAR: PRESENT

## 2020-03-17 RX ADMIN — FLUTICASONE FUROATE, UMECLIDINIUM BROMIDE AND VILANTEROL TRIFENATATE SCH INH: 100; 62.5; 25 POWDER RESPIRATORY (INHALATION) at 09:44

## 2020-03-17 RX ADMIN — DILTIAZEM HYDROCHLORIDE SCH MG: 120 CAPSULE, COATED, EXTENDED RELEASE ORAL at 09:44

## 2020-03-17 RX ADMIN — DUTASTERIDE SCH MG: 0.5 CAPSULE, LIQUID FILLED ORAL at 09:44

## 2020-03-17 RX ADMIN — CEFEPIME HYDROCHLORIDE SCH MLS/HR: 1 INJECTION, SOLUTION INTRAVENOUS at 09:43

## 2020-03-17 RX ADMIN — ATORVASTATIN CALCIUM SCH MG: 40 TABLET, FILM COATED ORAL at 21:54

## 2020-03-17 RX ADMIN — OXYCODONE HYDROCHLORIDE AND ACETAMINOPHEN SCH MG: 500 TABLET ORAL at 09:43

## 2020-03-17 RX ADMIN — ENOXAPARIN SODIUM SCH MG: 40 INJECTION SUBCUTANEOUS at 09:44

## 2020-03-17 RX ADMIN — LISINOPRIL SCH MG: 10 TABLET ORAL at 09:44

## 2020-03-17 RX ADMIN — FAMOTIDINE SCH: 20 TABLET, FILM COATED ORAL at 09:45

## 2020-03-17 RX ADMIN — LEVALBUTEROL HYDROCHLORIDE SCH MG: 0.63 SOLUTION RESPIRATORY (INHALATION) at 20:17

## 2020-03-17 RX ADMIN — MESALAMINE SCH MG: 250 CAPSULE ORAL at 17:09

## 2020-03-17 RX ADMIN — MESALAMINE SCH MG: 250 CAPSULE ORAL at 22:08

## 2020-03-17 RX ADMIN — CEFEPIME HYDROCHLORIDE SCH MLS/HR: 1 INJECTION, SOLUTION INTRAVENOUS at 21:54

## 2020-03-17 RX ADMIN — IPRATROPIUM BROMIDE SCH MG: 0.5 SOLUTION RESPIRATORY (INHALATION) at 14:09

## 2020-03-17 RX ADMIN — DIGOXIN SCH MG: 0.12 TABLET ORAL at 17:10

## 2020-03-17 RX ADMIN — METHYLPREDNISOLONE SODIUM SUCCINATE SCH MG: 125 INJECTION, POWDER, FOR SOLUTION INTRAMUSCULAR; INTRAVENOUS at 05:45

## 2020-03-17 RX ADMIN — NYSTATIN SCH UNIT: 100000 SUSPENSION ORAL at 05:45

## 2020-03-17 RX ADMIN — FERROUS SULFATE TAB 325 MG (65 MG ELEMENTAL FE) SCH MG: 325 (65 FE) TAB at 13:53

## 2020-03-17 RX ADMIN — VANCOMYCIN HYDROCHLORIDE SCH MLS/HR: 1 INJECTION, POWDER, LYOPHILIZED, FOR SOLUTION INTRAVENOUS at 05:45

## 2020-03-17 RX ADMIN — MESALAMINE SCH MG: 250 CAPSULE ORAL at 09:45

## 2020-03-17 RX ADMIN — METOPROLOL SUCCINATE SCH MG: 25 TABLET, EXTENDED RELEASE ORAL at 09:43

## 2020-03-17 RX ADMIN — IPRATROPIUM BROMIDE SCH MG: 0.5 SOLUTION RESPIRATORY (INHALATION) at 08:18

## 2020-03-17 RX ADMIN — NYSTATIN SCH UNIT: 100000 SUSPENSION ORAL at 17:10

## 2020-03-17 RX ADMIN — LEVALBUTEROL HYDROCHLORIDE SCH MG: 0.63 SOLUTION RESPIRATORY (INHALATION) at 08:18

## 2020-03-17 RX ADMIN — IPRATROPIUM BROMIDE SCH MG: 0.5 SOLUTION RESPIRATORY (INHALATION) at 20:17

## 2020-03-17 RX ADMIN — MESALAMINE SCH MG: 250 CAPSULE ORAL at 13:53

## 2020-03-17 RX ADMIN — NYSTATIN SCH UNIT: 100000 SUSPENSION ORAL at 13:52

## 2020-03-17 RX ADMIN — LEVALBUTEROL HYDROCHLORIDE SCH MG: 0.63 SOLUTION RESPIRATORY (INHALATION) at 14:09

## 2020-03-17 RX ADMIN — FAMOTIDINE SCH MG: 20 TABLET, FILM COATED ORAL at 21:53

## 2020-03-17 RX ADMIN — TAMSULOSIN HYDROCHLORIDE SCH MG: 0.4 CAPSULE ORAL at 21:53

## 2020-03-17 RX ADMIN — METOPROLOL SUCCINATE SCH MG: 25 TABLET, EXTENDED RELEASE ORAL at 21:53

## 2020-03-17 NOTE — PDOC PROGRESS REPORT
Subjective


Progress Note for:: 03/17/20


Subjective:: 


LUIS F VELA is a 86 year old male with past medical history significant for 

chronic A. fib, HTN, HLD, COPD, Crohn's disease who presented to ED with severe 

respiratory distress that awoke him from sleep.  Patient contracted a viral 

upper respiratory illness proximately 3 days prior to admission and this became 

worse gradually causing productive cough shortness of breath/ROBERTSON.  Patient does 

not use home oxygen prior to admission.  He was requiring BiPAP to maintain 

saturations on admission.  Given steroids, nebulizers, antibiotics on admission.

 Chest x-ray did not specifically show pneumonia the lungs had scattered 

congestion on exam.  Patient noted to be in A. fib with RVR on admission with 

rates in the 150s and is promptly came down with diltiazem drip with a rate of 

10mg/h.  Daughter is in charge of patient's medications and states he has not 

missed any doses including his digoxin and diltiazem. 





3/14/2020.  No acute events overnight, patient complaining of congestion, dry 

mouth, and being very anxious, otherwise denies any fever, chills, nausea, vo

miting, diarrhea, constipation or any urinary symptoms.





3/15/2020.  No acute events overnight.  Patient comfortably resting in bed no 

apparent distress, denies being anxious, denies any fever, chills, nausea, 

vomiting, diarrhea, constipation or any urinary symptoms.  Alert and oriented 

x2.  P.o. tolerant.





3/16/2020.  No acute events overnight.  Significant improvement of respiratory 

symptoms, in no apparent distress, denies any fever, chills, nausea, vomiting, 

diarrhea, constipation or any urinary symptoms.  Pending placement.





3/17/2020.  No acute events overnight.  Patient currently resting in bed no 

apparent distress.  Denies any fever, chills, nausea, vomiting, diarrhea, 

constipation or any urinary symptoms.  Patient could potentially be discharged 

home as daughter does not want him to be placed in SNF at this point because of 

the fact that she will not be able to visit him at the SNF.  Patient is in 

agreement.  Unfortunately patient's sodium trending down and nephrology has been

consulted pending recommendations.


Reason For Visit: 


CAP,COPD EXACERBATION,ACUTE ON CHRONIC AFIB WITH








Physical Exam


Vital Signs: 


                                        











Temp Pulse Resp BP Pulse Ox


 


 97.1 F   70   22 H  136/64 H  92 


 


 03/17/20 03:22  03/17/20 07:00  03/17/20 03:22  03/17/20 03:22  03/17/20 03:22








                                 Intake & Output











 03/16/20 03/17/20 03/18/20





 06:59 06:59 06:59


 


Intake Total 2086 1876 


 


Output Total 1625 550 


 


Balance 461 1326 


 


Weight 60.7 kg 60.7 kg 











General appearance: PRESENT: no acute distress, well-developed, well-nourished


Respiratory exam: PRESENT: clear to auscultation say.  ABSENT: rales, rhonchi, 

wheezes


Cardiovascular exam: PRESENT: RRR.  ABSENT: diastolic murmur, rubs, systolic 

murmur


GI/Abdominal exam: PRESENT: normal bowel sounds, soft.  ABSENT: distended, 

guarding, mass, organolmegaly, rebound, tenderness


Neurological exam: PRESENT: alert, awake, oriented to person, oriented to place,

oriented to time, oriented to situation, CN II-XII grossly intact.  ABSENT: 

motor sensory deficit





Results


Laboratory Results: 


                                        





                                 03/17/20 05:55 





                                 03/17/20 05:55 





                                        











  03/17/20 03/17/20 03/17/20





  05:55 05:55 05:55


 


WBC   18.0 H 


 


RBC   4.01 L 


 


Hgb   11.6 L 


 


Hct   32.2 L 


 


MCV   80 


 


MCH   28.9 


 


MCHC   35.9 


 


RDW   15.3 H 


 


Plt Count   233 


 


Seg Neutrophils %   Not Reportable 


 


Sodium    119.1 L*


 


Potassium    4.3


 


Chloride    87 L


 


Carbon Dioxide    27


 


Anion Gap    5


 


BUN    27 H


 


Creatinine  Cancelled   0.38 L


 


Est GFR ( Amer)  Cancelled   > 60


 


Est GFR (Non-Af Amer)  Cancelled  


 


Glucose    116 H


 


Calcium    8.0 L








                                        











  03/04/20 03/04/20 03/05/20





  21:51 21:51 00:20


 


Creatine Kinase  128  


 


Troponin I   0.057  0.152


 


NT-Pro-B Natriuret Pep   5170 H 














  03/07/20





  13:37


 


Creatine Kinase 


 


Troponin I 


 


NT-Pro-B Natriuret Pep  5250 H











Impressions: 


                                        





Modified Barium Swallow  03/12/20 00:00


IMPRESSION:  TRACE LARYNGEAL PENETRATION WITHOUT ASPIRATION. PLEASE SEE SPEECH 

PATHOLOGIST REPORT FOR OTHER FINDINGS AND RECOMMENDATIONS.


 














Assessment and Plan





- Diagnosis


(1) Hyponatremia


Is this a current diagnosis for this admission?: Yes   


Plan: 


Euvolemic hyponatremia.





Denies any vomiting or diarrhea.





Was on scheduled antipsychotics which has been DC'd for the last 3 days.





Lasix has been held for the last 3 days.  Has been on fluid restriction of 2 L 

per 24 hours for the last 3 days.





Not on SSRIs, PPIs or antiseizure meds.





Hold diuretics.  Consult nephrology for further recommendations.  Monitor for 

seizure.  Switch to regular diet briefly.








(2) Acute respiratory failure with hypoxia


Is this a current diagnosis for this admission?: Yes   


Plan: 


Moderate improvement compared to yesterday.  SPO2 WNL on RA.  





Likely due to underlying upper respiratory function, atypical pneumonia, COPD 

and CHF exacerbation.





Continue pulmonary toileting, duo nebs, BiPAP, LABA, ICS, LABA, empiric IV 

antibiotics, incentive spirometry, flutter valve.








(3) Atrial fibrillation with RVR


Is this a current diagnosis for this admission?: Yes   


Plan: 


Rate controlled.


History of chronic persistent atrial fibrillation.


Worsening likely due to respiratory failure and hypoxia


Initially was started on IV diltiazem drip and transition to p.o.


Continue diltiazem, digoxin, beta-blockers.  Monitor dig level.


Outpatient PCP and cardiology follow-up.








(4) Atypical pneumonia


Is this a current diagnosis for this admission?: Yes   


Plan: 


SPO2 WNL on RA.  Afebrile.  Worsening leukocytosis likely due to IV steroids for

underlying COPD exacerbation.  


Sputum culture on admission positive for Streptococcus species.


Likely bacterial infection followed viral illness


Received complete dose of ceftriaxone/doxycycline (azithromycin cannot be used 

due to very long QTC on admission)


Oxygen as needed with goal 89 to 94%


Given the fact that patient was not improving ans was still hypoxic with 

significant neutrophilic leukocytosis patient have been treated for healthcare 

associated pneumonia.  





Day 4 IV antibiotics.


Day 4 IV cefepime.  


Received 3 days of IV vancomycin.  DC vancomycin 3/17/2020.








(5) CHF exacerbation


Qualifiers: 


   Heart failure type: systolic   Qualified Code(s): I50.23 - Acute on chronic 

systolic (congestive) heart failure   


Is this a current diagnosis for this admission?: Yes   


Plan: 


Compensated.


Presented with acute systolic heart failure


BNP elevated to > 5000 on admission.


Likely due to hospital-acquired pneumonia complicated by underlying A. fib RVR. 




3/12/2020 2D echo LVEF 50 to 55%.  Mild diastolic dysfunction.  RSVP 60 mmHg.





Cardiac diet, strict in and out, fluid restriction, diuretics, ACE, beta-

blockers, digoxin.








(6) COPD exacerbation


Is this a current diagnosis for this admission?: Yes   


Plan: 


Moderate improvement.  SPO2 WNL on RA.


History of non-oxygen dependent COPD.


Former smoker.


Plan as per #1.








(7) Crohn's disease


Is this a current diagnosis for this admission?: No   


Plan: 


Does not appear to be acutely exacerbated.


H&H stable.  Denies any melena or hematochezia.


Takes Pentasa at home


Resume home meds.


Outpatient PCP and gastroenterology follow-up.








(8) Dysphagia


Is this a current diagnosis for this admission?: Yes   


Plan: 


Status post modified barium swallow study.


Mechanical soft diet recommended.








(9) HTN (hypertension)


Is this a current diagnosis for this admission?: Yes   


Plan: 


Uncontrolled.





Home medications are: captopril 50 mg p.o. twice daily, diltiazem 120 mg p.o. 

daily, Lasix 20 mg p.o. twice daily, metoprolol 25 mg p.o. daily.





Switch captopril to lisinopril for ease of administration.


Continue diltiazem, Lasix, metoprolol.  PRN hydralazine and metoprolol.


Adjust meds as needed.  Outpatient PCP follow-up.








(10) Influenza A


Is this a current diagnosis for this admission?: Yes   


Plan: 


Patient tested positive for Haemophilus influenza type b on admission.


Unfortunately was not started on Tamiflu until 3/10/2020.


Completed a course of Tamiflu.








- Plan Summary


Summary: 


3/6/2020


Temperature 97.9 pulse 76 blood pressure 144/58 O2 sat 94% on 4 L


We will have nursing try to wean him back down to 3 L


Patient has no history of thyroid disease will recheck free T3 and free T4


Medications include Lasix 10 mg IV every 12 hours Solu-Medrol 40 mg IV every 12 

hours Vibramycin 100 mg every 12 hours and Rocephin 2 g daily


Plus other incidental medications


Patient states he is breathing slightly better than on admission.  Patient lives

at home with his daughter and son-in-law


WBCs on admission were 18,000 today 13,000


Chemistry is grossly normal


1 blood culture grew out Streptococcus species


Will consult discharge planning to assess patient's needs at time of discharge





3/7/2020


Patient is afebrile 97 7 pulse is still between 90 and 100 blood pressure is 

elevated 186/57, although earlier this morning it was 144/58


I am also going to increase his Apresoline to 20 mg as needed


Patient is on captopril 50 mg every 12 hours, Cardizem 60 mg every 8 hours, 

Lasix was increased to 20 mg IV every 12 hours, Solu-Medrol was increased to 40 

mg every 8 hours.


Patient was also on Toprol XL 25 mg daily prior to admission and I will resume 

this


Patient's oxygen saturation is between 91 and 97% on 2 L nasal cannula





Patient is on IV Vibramycin as well as Rocephin, sensitivities appear to be 

appropriate for these 2 drugs


Patient is making slow progress


Patient lives at home with family.  Patient states he was not on oxygen prior to

coming in


Chest x-ray does showed pneumonia and patient also was flu A+





3/8/2020


Patient is more agitated when he is awake although you can speak to him and he 

will calm down.  Patient pulled out his Ball catheter yesterday and caused a 

little bit of trauma resulting in hematuria which has cleared on its own.


Patient does much better when on the BiPAP but this does require Ativan


Overall blood pressures are starting to come down


ABGs done early this morning are actually fairly normal


White count on admission was 18,000 and is now now down to 7.8


Lactic acid remains normal





Had a long conversation yesterday with the patient's daughter, Danita, I 

explained to her how sick her father was and that this could be a life-

threatening illness.  Patient told me yesterday that "he would see me in 

[facility]".  


I think we should continue on the same course of treatment since he appears to 

be gradually improving


Portable chest x-ray done yesterday is actually improved from admission





3/9/2020


This morning his temp is 97 6 pulse is anywhere from  blood pressure 

148/57 respiratory respiratory rates 25-37 on BiPAP he is between 90 and 98% 

saturation


Patient had a blood gas yesterday which was not all that terribly abnormal


Chemistry panel appears stable


1 blood culture cultures appears to be growing out a strep mitis , patient 

currently on IV doxycycline and IV Rocephin





Patient is requiring IV Haldol to keep him in the bed.  This morning I talked to

respiratory therapy about trying to switch him over to nasal cannula as he 

appears to be more anxious and agitated with the mask on than without it.  We 

will see how that goes





Patient's mental baseline was probably of some mild dementia certainly not is 

confused and altered as he is now.  I think patient's pulmonary status prior to 

admission was also marginal.





Patient's chest x-ray from 2 days ago really still shows just a cardiomegaly 

which is impressive but no acute pulmonary problems.


I will consult cardiology today to see if they have any insight

## 2020-03-17 NOTE — PDOC CONSULTATION
Consultation


Consult Date: 03/17/20


Provider Consulted: TIP VAZQUEZ


Consult reason:: Hyponatremia





History of Present Illness


Admission Date/PCP: 


  03/04/20 23:54





  INEZ WAYNE PA-C





History of Present Illness: 


LUIS F VELA is a 86 year old male with history of atrial fibrillation, 

congestive heart failure, hypertension and COPD who is was admitted on March 4, 2020 due to acute respiratory distress.  Patient was initially requiring BiPAP. 

He was found to be positive for influenza A and completed Tamiflu.  He was also 

diagnosed with atypical pneumonia,  completed ceftriaxone and doxycycline.  He 

was also given vancomycin, last day today.  Currently he is still on IV 

cefepime.  Patient has developed worsening hyponatremia for at least the last 5 

days.  Since admission the patient's sodium level ranges anywhere between 130-

134.  On March 13 he had a sodium of 129.6, yesterday March 16 he had a sodium 

of 123.9 and today he had a sodium level of 119.1.  His TSH was mildly low at 

0.26 but both the free T4 and free T3 are within normal limits.  He was on 

Haldol and lorazepam but at one point both were now discontinued with the Haldol

being stopped on March 14 and lorazepam on March 10.  He has been on Lasix 

discontinued on March 15.  He has been placed on fluid restriction of 2 L a day 

for the past 3 days.  Currently he is not on any SSRIs nor any antiseizure 

medications.  His urine output has been quite acceptable 12 to 1600 mL daily 

except for yesterday when he only had 550 mL blood.  However this might be due 

to removal of Ball catheter yesterday as well.  His intake is anywhere between 

1.5 to 2 L daily.


When I saw the patient today he complains that his stomach is messed up with 

some nausea but no vomiting.  He does not seem to be very reliable and is very 

hard of hearing.  When I was in the room he was very much concerned of calling 

his daughter from TV remote.  When the CNA came, she told the patient that they 

have tried calling his daughter already earlier.  Patient denies any shortness 

of breath, chest pains or palpitations.  He states that he feels like he has 

some indigestion.





Past Medical History


Cardiac Medical History: Reports: Atrial Fibrillation, CHF-Systolic, 

Hyperlipidemia, Hypertension-primary


Pulmonary Medical History: Reports: Chronic Obstructive Pulmonary Disease (COPD)





Past Surgical History


Past Surgical History: Reports: Vascular Surgery





Social History


Information Source: [facility] Records


Lives with: Family


Smoking Status: Former Smoker


Electronic Cigarette use?: No


Hx Recreational Drug Use: No





- Advance Directive


Resuscitation Status: Do Not Resuscitate





Family History


Family History: 





I attempted to ask the patient about family history but patient has not been 

able to provide any significant responses.


Parental Family History Reviewed: Yes


Children Family History Reviewed: Yes


Sibling(s) Family History Reviewed.: Yes





Medication/Allergy


Home Medications: 








Albuterol Sulfate [Proair Hfa Inhalation Aerosol 8.5 gm Mdi] 2 puff IH Q4HP PRN 

03/05/20 


Ascorbic Acid [Vitamin C] 1,000 mg PO DAILY@0800 03/05/20 


Atorvastatin Calcium [Lipitor 40 mg Tablet] 40 mg PO QHS 03/05/20 


Captopril 50 mg PO BID@0800,2000 03/05/20 


Cetirizine HCl [Zyrtec 10 mg Tablet] 10 mg PO HSP PRN 03/05/20 


Digoxin 0.125 mg PO DAILY@1600 03/05/20 


Diltiazem HCl [Diltiazem 24Hr ER (Cd)] 120 mg PO DAILY@0800 03/05/20 


Dutasteride 0.5 mg PO DAILY@0800 03/05/20 


Ferrous Sulfate [Feosol 325 mg Tablet] 325 mg PO DAILY@1200 03/05/20 


Furosemide [Lasix 40 mg Tablet] 20 mg PO BID@0800,1600 03/05/20 


Meloxicam [Mobic 15 mg Tablet] 7.5 mg PO BID@0800,1600 03/05/20 


Mesalamine [Pentasa] 1,000 mg PO QID 03/05/20 


Metoprolol Succinate [Toprol Xl 25 mg Tab.sr] 25 mg PO DAILY@0800 03/05/20 


Pantoprazole Sodium 40 mg PO DAILY@0800 03/05/20 


Potassium Chloride 20 meq PO DAILY@0800 03/05/20 


Silver Sulfadiazine [Ssd] 1 applic TOP BIDP PRN 03/05/20 


Tamsulosin HCl [Flomax] 0.4 mg PO QHS 03/05/20 








Allergies/Adverse Reactions: 


                                        





Penicillins Allergy (Verified 03/04/20 22:01)


   











Review of Systems


All systems: reviewed and no additional remarkable complaints except as stated


Review of Systems: 





Constitutional: ABSENT: chills, fatigue, fever(s), headache(s), weight gain, 

weight loss


Eyes: ABSENT: visual disturbances


Ears: ABSENT: hearing changes


Cardiovascular: ABSENT: chest pain, dyspnea on exertion, edema, orthropnea, 

palpitations


Respiratory: ABSENT: cough, dyspnea, hemoptysis


Gastrointestinal: ABSENT: abdominal pain, constipation, diarrhea, hematemesis, 

hematochezia,  vomiting; admits nausea and feeling of indigestion


Genitourinary: ABSENT: dysuria, hematuria


Musculoskeletal: ABSENT: joint swelling


Integumentary: ABSENT: rash, wounds


Neurological: ABSENT: abnormal gait, abnormal speech, confusion, dizziness, 

focal weakness, numbness, syncope


Psychiatric: ABSENT: anxiety, depression


Endocrine: ABSENT: cold intolerance, heat intolerance, polydipsia, polyuria


Hematologic/Lymphatic: ABSENT: easy bleeding, easy bruising, lymphadenopathy





Physical Exam


Vital Signs: 


                                        











Temp Pulse Resp BP Pulse Ox


 


 98.1 F   72   24 H  149/48 H  88 L


 


 03/17/20 08:15  03/17/20 08:21  03/17/20 08:21  03/17/20 08:15  03/17/20 08:21








                                 Intake & Output











 03/16/20 03/17/20 03/18/20





 06:59 06:59 06:59


 


Intake Total 2086 1876 300


 


Output Total 1625 550 


 


Balance 461 1326 300


 


Weight 60.7 kg 60.7 kg 











Exam: 





General appearance: No acute distress, cooperative, well-developed, well-

nourished


Head exam: PRESENT: atraumatic, normocephalic


Eye exam: PRESENT: Conjunctiva slightly pale, EOMI, PERRLA.  ABSENT: 

conjunctival injection, scleral icterus


Mouth exam: PRESENT: moist, neck supple, tongue midline


Neck exam: PRESENT: full ROM.  ABSENT: carotid bruit, JVD, lymphadenopathy, 

thyromegaly


Respiratory exam: PRESENT: Coarse breath sounds diffusely to auscultation 

bilaterally.  ABSENT: rales, rhonchi, stridor, wheezes


Cardiovascular exam: PRESENT: RRR, +S1, +S2.  ABSENT: systolic murmur


Pulses: PRESENT: normal radial pulses, normal dorsalis pedis pulses


GI/Abdominal exam: PRESENT: normal bowel sounds, soft.  ABSENT: guarding, mass, 

tenderness


Rectal exam: Deferred


Extremities exam: PRESENT: full ROM.  ABSENT: calf tenderness, pedal edema


Musculoskeletal: PRESENT: full ROM.  ABSENT: deformity


Neurological exam: PRESENT: alert, Awake, Oriented to person, Oriented to place,

 Oriented to time, reflexes normal, CN II-XII grossly intact.  ABSENT: motor 

sensory deficit


Psychiatric exam: PRESENT: appropriate affect, normal mood.  ABSENT: homicidal 

ideation, suicidal ideation


Skin exam: PRESENT: intact, dry, warm.  ABSENT: rash





Results


Laboratory Results: 


                                        





                                 03/17/20 05:55 





                                 03/17/20 05:55 





                                        











  03/17/20 03/17/20 03/17/20





  05:55 05:55 05:55


 


WBC   18.0 H 


 


RBC   4.01 L 


 


Hgb   11.6 L 


 


Hct   32.2 L 


 


MCV   80 


 


MCH   28.9 


 


MCHC   35.9 


 


RDW   15.3 H 


 


Plt Count   233 


 


Seg Neutrophils %   Not Reportable 


 


Sodium    119.1 L*


 


Potassium    4.3


 


Chloride    87 L


 


Carbon Dioxide    27


 


Anion Gap    5


 


BUN    27 H


 


Creatinine  Cancelled   0.38 L


 


Est GFR ( Amer)  Cancelled   > 60


 


Est GFR (Non-Af Amer)  Cancelled  


 


Glucose    116 H


 


Calcium    8.0 L








                                        











  03/04/20 03/04/20 03/05/20





  21:51 21:51 00:20


 


Creatine Kinase  128  


 


Troponin I   0.057  0.152


 


NT-Pro-B Natriuret Pep   5170 H 














  03/07/20





  13:37


 


Creatine Kinase 


 


Troponin I 


 


NT-Pro-B Natriuret Pep  5250 H











Impressions: 


                                        





Modified Barium Swallow  03/12/20 00:00


IMPRESSION:  TRACE LARYNGEAL PENETRATION WITHOUT ASPIRATION. PLEASE SEE SPEECH 

PATHOLOGIST REPORT FOR OTHER FINDINGS AND RECOMMENDATIONS.


 














Assessment & Plan





- Diagnosis


(1) Hyponatremia


Is this a current diagnosis for this admission?: Yes   


Plan: 


Patient appears to be clinically euvolemic.  Initial work-up for cause of 

hyponatremia has been negative to include acceptable thyroid functions.  Need to

 consider SIADH.  I will check the patient's urine osmolality, urine osmolality,

 urine sodium, chloride and creatinine.  After urine collection I will give the 

patient a trial of a dose of tolvaptan 10 mg x 1 dose.  Repeat BMP in the 

morning.








(2) Influenza A


Is this a current diagnosis for this admission?: Yes   


Plan: 


Completed Tamiflu.








(3) Atypical pneumonia


Is this a current diagnosis for this admission?: Yes   


Plan: 


Completed ceftriaxone, doxycycline, given 3 days of vancomycin and is currently 

on IV cefepime.








(4) HTN (hypertension)


Is this a current diagnosis for this admission?: Yes   





(5) Dementia


Is this a current diagnosis for this admission?: Yes   





- Notes


Notes: 





Thank you very much for this consultation.  I will  follow patient with you.





- Time


Time Spent: 50 to 70 Minutes

## 2020-03-18 LAB
ABSOLUTE LYMPHOCYTES# (MANUAL): 0 10^3/UL (ref 0.5–4.7)
ABSOLUTE MONOCYTES # (MANUAL): 0.9 10^3/UL (ref 0.1–1.4)
ADD MANUAL DIFF: YES
ANION GAP SERPL CALC-SCNC: 5 MMOL/L (ref 5–19)
ANISOCYTOSIS BLD QL SMEAR: SLIGHT
BASOPHILS NFR BLD MANUAL: 0 % (ref 0–2)
BUN SERPL-MCNC: 29 MG/DL (ref 7–20)
BURR CELLS BLD QL SMEAR: (no result)
CALCIUM: 8.1 MG/DL (ref 8.4–10.2)
CHLORIDE SERPL-SCNC: 92 MMOL/L (ref 98–107)
CO2 SERPL-SCNC: 27 MMOL/L (ref 22–30)
EOSINOPHIL NFR BLD MANUAL: 0 % (ref 0–6)
ERYTHROCYTE [DISTWIDTH] IN BLOOD BY AUTOMATED COUNT: 15.8 % (ref 11.5–14)
GLUCOSE SERPL-MCNC: 84 MG/DL (ref 75–110)
HCT VFR BLD CALC: 30.2 % (ref 37.9–51)
HGB BLD-MCNC: 11.1 G/DL (ref 13.5–17)
MCH RBC QN AUTO: 29.4 PG (ref 27–33.4)
MCHC RBC AUTO-ENTMCNC: 36.7 G/DL (ref 32–36)
MCV RBC AUTO: 80 FL (ref 80–97)
MONOCYTES % (MANUAL): 5 % (ref 3–13)
NEUTS HYPERSEG BLD QL SMEAR: PRESENT
OVALOCYTES BLD QL SMEAR: SLIGHT
PLATELET # BLD: 207 10^3/UL (ref 150–450)
PLATELET COMMENT: ADEQUATE
POIKILOCYTOSIS BLD QL SMEAR: (no result)
POTASSIUM SERPL-SCNC: 3.8 MMOL/L (ref 3.6–5)
RBC # BLD AUTO: 3.78 10^6/UL (ref 4.35–5.55)
SEGMENTED NEUTROPHILS % (MAN): 95 % (ref 42–78)
TOTAL CELLS COUNTED BLD: 100
VARIANT LYMPHS NFR BLD MANUAL: 0 % (ref 13–45)
WBC # BLD AUTO: 18.6 10^3/UL (ref 4–10.5)

## 2020-03-18 RX ADMIN — DIGOXIN SCH MG: 0.12 TABLET ORAL at 17:39

## 2020-03-18 RX ADMIN — ACETAMINOPHEN PRN MG: 325 TABLET ORAL at 12:07

## 2020-03-18 RX ADMIN — MESALAMINE SCH MG: 250 CAPSULE ORAL at 09:49

## 2020-03-18 RX ADMIN — LISINOPRIL SCH MG: 10 TABLET ORAL at 09:46

## 2020-03-18 RX ADMIN — DUTASTERIDE SCH MG: 0.5 CAPSULE, LIQUID FILLED ORAL at 09:48

## 2020-03-18 RX ADMIN — MESALAMINE SCH MG: 250 CAPSULE ORAL at 14:55

## 2020-03-18 RX ADMIN — NYSTATIN SCH UNIT: 100000 SUSPENSION ORAL at 12:03

## 2020-03-18 RX ADMIN — MESALAMINE SCH MG: 250 CAPSULE ORAL at 17:39

## 2020-03-18 RX ADMIN — NYSTATIN SCH UNIT: 100000 SUSPENSION ORAL at 17:39

## 2020-03-18 RX ADMIN — POTASSIUM CHLORIDE SCH MEQ: 750 TABLET, FILM COATED, EXTENDED RELEASE ORAL at 09:47

## 2020-03-18 RX ADMIN — ENOXAPARIN SODIUM SCH MG: 40 INJECTION SUBCUTANEOUS at 09:46

## 2020-03-18 RX ADMIN — FAMOTIDINE SCH MG: 20 TABLET, FILM COATED ORAL at 23:03

## 2020-03-18 RX ADMIN — NYSTATIN SCH UNIT: 100000 SUSPENSION ORAL at 06:15

## 2020-03-18 RX ADMIN — LEVALBUTEROL HYDROCHLORIDE SCH MG: 0.63 SOLUTION RESPIRATORY (INHALATION) at 13:55

## 2020-03-18 RX ADMIN — ATORVASTATIN CALCIUM SCH MG: 40 TABLET, FILM COATED ORAL at 23:02

## 2020-03-18 RX ADMIN — IPRATROPIUM BROMIDE SCH MG: 0.5 SOLUTION RESPIRATORY (INHALATION) at 08:27

## 2020-03-18 RX ADMIN — TAMSULOSIN HYDROCHLORIDE SCH MG: 0.4 CAPSULE ORAL at 23:02

## 2020-03-18 RX ADMIN — CEFEPIME HYDROCHLORIDE SCH MLS/HR: 1 INJECTION, SOLUTION INTRAVENOUS at 23:01

## 2020-03-18 RX ADMIN — METOPROLOL SUCCINATE SCH MG: 25 TABLET, EXTENDED RELEASE ORAL at 23:03

## 2020-03-18 RX ADMIN — CEFEPIME HYDROCHLORIDE SCH MLS/HR: 1 INJECTION, SOLUTION INTRAVENOUS at 09:47

## 2020-03-18 RX ADMIN — LEVALBUTEROL HYDROCHLORIDE SCH MG: 0.63 SOLUTION RESPIRATORY (INHALATION) at 08:27

## 2020-03-18 RX ADMIN — FERROUS SULFATE TAB 325 MG (65 MG ELEMENTAL FE) SCH MG: 325 (65 FE) TAB at 12:03

## 2020-03-18 RX ADMIN — METOPROLOL SUCCINATE SCH MG: 25 TABLET, EXTENDED RELEASE ORAL at 09:47

## 2020-03-18 RX ADMIN — OXYCODONE HYDROCHLORIDE AND ACETAMINOPHEN SCH MG: 500 TABLET ORAL at 09:46

## 2020-03-18 RX ADMIN — IPRATROPIUM BROMIDE SCH MG: 0.5 SOLUTION RESPIRATORY (INHALATION) at 20:40

## 2020-03-18 RX ADMIN — FLUTICASONE FUROATE, UMECLIDINIUM BROMIDE AND VILANTEROL TRIFENATATE SCH INH: 100; 62.5; 25 POWDER RESPIRATORY (INHALATION) at 09:49

## 2020-03-18 RX ADMIN — IPRATROPIUM BROMIDE SCH MG: 0.5 SOLUTION RESPIRATORY (INHALATION) at 13:55

## 2020-03-18 RX ADMIN — MESALAMINE SCH MG: 250 CAPSULE ORAL at 23:02

## 2020-03-18 RX ADMIN — NYSTATIN SCH: 100000 SUSPENSION ORAL at 01:51

## 2020-03-18 RX ADMIN — DILTIAZEM HYDROCHLORIDE SCH MG: 120 CAPSULE, COATED, EXTENDED RELEASE ORAL at 09:47

## 2020-03-18 RX ADMIN — LEVALBUTEROL HYDROCHLORIDE SCH MG: 0.63 SOLUTION RESPIRATORY (INHALATION) at 20:40

## 2020-03-18 RX ADMIN — FAMOTIDINE SCH MG: 20 TABLET, FILM COATED ORAL at 09:47

## 2020-03-18 NOTE — PDOC PROGRESS REPORT
Subjective


Progress Note for:: 03/18/20


Subjective:: 


LUIS F VELA is a 86 year old male with past medical history significant for 

chronic A. fib, HTN, HLD, COPD, Crohn's disease who presented to ED with severe 

respiratory distress that awoke him from sleep.  Patient contracted a viral 

upper respiratory illness proximately 3 days prior to admission and this became 

worse gradually causing productive cough shortness of breath/ROBERTSON.  Patient does 

not use home oxygen prior to admission.  He was requiring BiPAP to maintain 

saturations on admission.  Given steroids, nebulizers, antibiotics on admission.

 Chest x-ray did not specifically show pneumonia the lungs had scattered 

congestion on exam.  Patient noted to be in A. fib with RVR on admission with 

rates in the 150s and is promptly came down with diltiazem drip with a rate of 

10mg/h.  Daughter is in charge of patient's medications and states he has not 

missed any doses including his digoxin and diltiazem. 





3/14/2020.  No acute events overnight, patient complaining of congestion, dry 

mouth, and being very anxious, otherwise denies any fever, chills, nausea, vo

miting, diarrhea, constipation or any urinary symptoms.





3/15/2020.  No acute events overnight.  Patient comfortably resting in bed no 

apparent distress, denies being anxious, denies any fever, chills, nausea, 

vomiting, diarrhea, constipation or any urinary symptoms.  Alert and oriented 

x2.  P.o. tolerant.





3/16/2020.  No acute events overnight.  Significant improvement of respiratory 

symptoms, in no apparent distress, denies any fever, chills, nausea, vomiting, 

diarrhea, constipation or any urinary symptoms.  Pending placement.





3/17/2020.  No acute events overnight.  Patient currently resting in bed no 

apparent distress.  Denies any fever, chills, nausea, vomiting, diarrhea, 

constipation or any urinary symptoms.  Patient could potentially be discharged 

home as daughter does not want him to be placed in SNF at this point because of 

the fact that she will not be able to visit him at the SNF.  Patient is in 

agreement.  Unfortunately patient's sodium trending down and nephrology has been

consulted pending recommendations.





3/18/2020.  No acute events overnight.  Comfortably resting in bed no apparent 

distress, denies any fever, chills, nausea, vomiting, diarrhea, constipation or 

any urinary symptoms.  Serum sodium improving however still not within normal 

limits.


Reason For Visit: 


CAP,COPD EXACERBATION,ACUTE ON CHRONIC AFIB WITH








Physical Exam


Vital Signs: 


                                        











Temp Pulse Resp BP Pulse Ox


 


 97.2 F   76   18   105/55 L  92 


 


 03/17/20 23:14  03/18/20 08:00  03/18/20 08:00  03/17/20 23:14  03/18/20 08:00








                                 Intake & Output











 03/17/20 03/18/20 03/19/20





 06:59 06:59 06:59


 


Intake Total 1876 560 


 


Output Total 550 1925 


 


Balance 1326 -1365 


 


Weight 60.7 kg 61 kg 











General appearance: PRESENT: no acute distress, well-developed, well-nourished


Head exam: PRESENT: atraumatic, normocephalic


Respiratory exam: PRESENT: clear to auscultation say.  ABSENT: rales, rhonchi, 

wheezes


Cardiovascular exam: PRESENT: RRR.  ABSENT: diastolic murmur, rubs, systolic 

murmur


GI/Abdominal exam: PRESENT: normal bowel sounds, soft.  ABSENT: distended, 

guarding, mass, organolmegaly, rebound, tenderness


Neurological exam: PRESENT: alert, awake, oriented to person, oriented to place,

CN II-XII grossly intact.  ABSENT: motor sensory deficit





Results


Laboratory Results: 


                                        





                                 03/18/20 04:08 





                                 03/18/20 04:08 





                                        











  03/17/20 03/17/20 03/17/20





  05:55 05:55 12:57


 


WBC   


 


RBC   


 


Hgb   


 


Hct   


 


MCV   


 


MCH   


 


MCHC   


 


RDW   


 


Plt Count   


 


Seg Neutrophils %   


 


Sodium   


 


Potassium   


 


Chloride   Cancelled 


 


Carbon Dioxide   


 


Anion Gap   


 


BUN   


 


Creatinine   


 


Est GFR (African Amer)   


 


Glucose   


 


Serum Osmolality  255 L  


 


Calcium   


 


Urine Osmolality    768














  03/18/20 03/18/20





  04:08 04:08


 


WBC   18.6 H


 


RBC   3.78 L


 


Hgb   11.1 L


 


Hct   30.2 L


 


MCV   80


 


MCH   29.4


 


MCHC   36.7 H


 


RDW   15.8 H


 


Plt Count   207


 


Seg Neutrophils %   Not Reportable


 


Sodium  124.3 L 


 


Potassium  3.8 


 


Chloride  92 L 


 


Carbon Dioxide  27 


 


Anion Gap  5 


 


BUN  29 H 


 


Creatinine  0.49 L 


 


Est GFR (African Amer)  > 60 


 


Glucose  84 


 


Serum Osmolality  


 


Calcium  8.1 L 


 


Urine Osmolality  








                                        











  03/04/20 03/04/20 03/05/20





  21:51 21:51 00:20


 


Creatine Kinase  128  


 


Troponin I   0.057  0.152


 


NT-Pro-B Natriuret Pep   5170 H 














  03/07/20





  13:37


 


Creatine Kinase 


 


Troponin I 


 


NT-Pro-B Natriuret Pep  5250 H











Impressions: 


                                        





Modified Barium Swallow  03/12/20 00:00


IMPRESSION:  TRACE LARYNGEAL PENETRATION WITHOUT ASPIRATION. PLEASE SEE SPEECH 

PATHOLOGIST REPORT FOR OTHER FINDINGS AND RECOMMENDATIONS.


 














Assessment and Plan





- Diagnosis


(1) Hyponatremia


Is this a current diagnosis for this admission?: Yes   


Plan: 


Mild improvement after receiving 1 dose of tolvaptan.





Euvolemic hyponatremia.





Denies any vomiting or diarrhea.





Was on scheduled antipsychotics which has been DC'd for the last 3 days.





Lasix has been held for the last 3 days.  Has been on fluid restriction of 2 L 

per 24 hours for the last 3 days.





Not on SSRIs, PPIs or antiseizure meds.





Hold diuretics.  Nephrology on board.  Recommendations noted.  





Monitor for seizure.  Switch to regular diet briefly.








(2) Acute respiratory failure with hypoxia


Is this a current diagnosis for this admission?: Yes   


Plan: 


Moderate improvement.  SPO2 WNL on 2 L nasal cannula.  





Likely due to underlying upper respiratory function, atypical pneumonia, COPD 

and CHF exacerbation.





Received 5 days of IV steroids.





Continue pulmonary toileting, duo nebs, BiPAP, LABA, ICS, LABA, empiric IV 

antibiotics, incentive spirometry, flutter valve.








(3) Atrial fibrillation with RVR


Is this a current diagnosis for this admission?: Yes   


Plan: 


Rate controlled.


History of chronic persistent atrial fibrillation.


Worsening likely due to respiratory failure and hypoxia


Initially was started on IV diltiazem drip and transition to p.o.


Continue diltiazem, digoxin, beta-blockers.  Monitor dig level.


Outpatient PCP and cardiology follow-up.








(4) Atypical pneumonia


Is this a current diagnosis for this admission?: Yes   


Plan: 


SPO2 WNL on RA.  Afebrile. 


Worsening leukocytosis likely due to IV steroids for underlying COPD 

exacerbation.  


Sputum culture on admission positive for Streptococcus species.


Likely bacterial infection followed viral illness


Received complete dose of ceftriaxone/doxycycline (azithromycin cannot be used 

due to very long QTC on admission)


Oxygen as needed with goal 89 to 94%


Given the fact that patient was not improving ans was still hypoxic with 

significant neutrophilic leukocytosis patient have been treated for healthcare 

associated pneumonia.  





Day 5 IV antibiotics.


Day 5 IV cefepime.  


Received 3 days of IV vancomycin.  DC vancomycin 3/17/2020.








(5) CHF exacerbation


Qualifiers: 


   Heart failure type: systolic   Qualified Code(s): I50.23 - Acute on chronic 

systolic (congestive) heart failure   


Is this a current diagnosis for this admission?: Yes   


Plan: 


Compensated.


Presented with acute systolic heart failure


BNP elevated to > 5000 on admission.


Likely due to hospital-acquired pneumonia complicated by underlying A. fib RVR. 




3/12/2020 2D echo LVEF 50 to 55%.  Mild diastolic dysfunction.  RSVP 60 mmHg.





Cardiac diet, strict in and out, fluid restriction, diuretics, ACE, beta-blocker

s, digoxin.








(6) COPD exacerbation


Is this a current diagnosis for this admission?: Yes   


Plan: 


Moderate improvement.  SPO2 WNL on RA.


History of non-oxygen dependent COPD.


Former smoker.


Plan as per #1.








(7) Crohn's disease


Is this a current diagnosis for this admission?: No   


Plan: 


Does not appear to be acutely exacerbated.


H&H stable.  Denies any melena or hematochezia.


Takes Pentasa at home


Resume home meds.


Outpatient PCP and gastroenterology follow-up.








(8) Dysphagia


Is this a current diagnosis for this admission?: Yes   


Plan: 


Status post modified barium swallow study.


Mechanical soft diet recommended.








(9) HTN (hypertension)


Is this a current diagnosis for this admission?: Yes   


Plan: 


Uncontrolled.





Home medications are: captopril 50 mg p.o. twice daily, diltiazem 120 mg p.o. 

daily, Lasix 20 mg p.o. twice daily, metoprolol 25 mg p.o. daily.





Switch captopril to lisinopril for ease of administration.


Continue diltiazem, Lasix, metoprolol.  PRN hydralazine and metoprolol.


Adjust meds as needed.  Outpatient PCP follow-up.








(10) Influenza A


Is this a current diagnosis for this admission?: Yes   


Plan: 


Patient tested positive for Haemophilus influenza type b on admission.


Unfortunately was not started on Tamiflu until 3/10/2020.


Completed a course of Tamiflu.








- Plan Summary


Summary: 


3/6/2020


Temperature 97.9 pulse 76 blood pressure 144/58 O2 sat 94% on 4 L


We will have nursing try to wean him back down to 3 L


Patient has no history of thyroid disease will recheck free T3 and free T4


Medications include Lasix 10 mg IV every 12 hours Solu-Medrol 40 mg IV every 12 

hours Vibramycin 100 mg every 12 hours and Rocephin 2 g daily


Plus other incidental medications


Patient states he is breathing slightly better than on admission.  Patient lives

at home with his daughter and son-in-law


WBCs on admission were 18,000 today 13,000


Chemistry is grossly normal


1 blood culture grew out Streptococcus species


Will consult discharge planning to assess patient's needs at time of discharge





3/7/2020


Patient is afebrile 97 7 pulse is still between 90 and 100 blood pressure is 

elevated 186/57, although earlier this morning it was 144/58


I am also going to increase his Apresoline to 20 mg as needed


Patient is on captopril 50 mg every 12 hours, Cardizem 60 mg every 8 hours, 

Lasix was increased to 20 mg IV every 12 hours, Solu-Medrol was increased to 40 

mg every 8 hours.


Patient was also on Toprol XL 25 mg daily prior to admission and I will resume 

this


Patient's oxygen saturation is between 91 and 97% on 2 L nasal cannula





Patient is on IV Vibramycin as well as Rocephin, sensitivities appear to be 

appropriate for these 2 drugs


Patient is making slow progress


Patient lives at home with family.  Patient states he was not on oxygen prior to

coming in


Chest x-ray does showed pneumonia and patient also was flu A+





3/8/2020


Patient is more agitated when he is awake although you can speak to him and he 

will calm down.  Patient pulled out his Ball catheter yesterday and caused a 

little bit of trauma resulting in hematuria which has cleared on its own.


Patient does much better when on the BiPAP but this does require Ativan


Overall blood pressures are starting to come down


ABGs done early this morning are actually fairly normal


White count on admission was 18,000 and is now now down to 7.8


Lactic acid remains normal





Had a long conversation yesterday with the patient's daughter, Danita, I 

explained to her how sick her father was and that this could be a life-

threatening illness.  Patient told me yesterday that "he would see me in 

[facility]".  


I think we should continue on the same course of treatment since he appears to 

be gradually improving


Portable chest x-ray done yesterday is actually improved from admission





3/9/2020


This morning his temp is 97 6 pulse is anywhere from  blood pressure 

148/57 respiratory respiratory rates 25-37 on BiPAP he is between 90 and 98% 

saturation


Patient had a blood gas yesterday which was not all that terribly abnormal


Chemistry panel appears stable


1 blood culture cultures appears to be growing out a strep mitis , patient 

currently on IV doxycycline and IV Rocephin





Patient is requiring IV Haldol to keep him in the bed.  This morning I talked to

respiratory therapy about trying to switch him over to nasal cannula as he 

appears to be more anxious and agitated with the mask on than without it.  We 

will see how that goes





Patient's mental baseline was probably of some mild dementia certainly not is 

confused and altered as he is now.  I think patient's pulmonary status prior to 

admission was also marginal.





Patient's chest x-ray from 2 days ago really still shows just a cardiomegaly 

which is impressive but no acute pulmonary problems.


I will consult cardiology today to see if they have any insight

## 2020-03-18 NOTE — PDOC PROGRESS REPORT
Subjective


Progress Note for:: 03/18/20


Subjective:: 





Patient is lying comfortably in bed when I entered the room.  He does not 

verbalize any complaints.  I gave him a dose of tolvaptan yesterday and he 

passed about 1925 mL of urine output.  His sodium also improved to 124.3 from 

119.  His blood pressure is slightly lower this morning but still acceptable.


Reason For Visit: 


CAP,COPD EXACERBATION,ACUTE ON CHRONIC AFIB WITH








Physical Exam


Vital Signs: 


                                        











Temp Pulse Resp BP Pulse Ox


 


 98.1 F   76   18   121/29 L  92 


 


 03/18/20 07:58  03/18/20 08:00  03/18/20 08:00  03/18/20 07:58  03/18/20 08:00








                                 Intake & Output











 03/17/20 03/18/20 03/19/20





 06:59 06:59 06:59


 


Intake Total 1876 610 


 


Output Total 550 1925 


 


Balance 1326 -1315 


 


Weight 60.7 kg 61 kg 











Exam: 





General appearance: PRESENT: no acute distress, cooperative, well-developed, 

well-nourished


Head exam: PRESENT: atraumatic, normocephalic


Eye exam: PRESENT: conjunctiva pale, PERRLA.  ABSENT: scleral icterus


Neck exam: ABSENT: JVD


Respiratory exam: PRESENT: Still mildly coarse breath sounds.  ABSENT: crackles,

rales, rhonchi, unlabored, wheezes


Cardiovascular exam: PRESENT: Regular rate rhythm -+S1, +S2.  ABSENT: diastolic 

murmur, systolic murmur


GI/Abdominal exam: PRESENT: normal bowel sounds, soft.  ABSENT: guarding, mass, 

tenderness


Extremities exam: ABSENT: No edema


Neurological exam: PRESENT: alert, awake, oriented to person, place and time.


Skin exam: PRESENT: dry, warm,





Results


Laboratory Results: 


                                        





                                 03/18/20 04:08 





                                 03/18/20 04:08 





                                        











  03/17/20 03/17/20 03/17/20





  05:55 05:55 12:57


 


WBC   


 


RBC   


 


Hgb   


 


Hct   


 


MCV   


 


MCH   


 


MCHC   


 


RDW   


 


Plt Count   


 


Seg Neutrophils %   


 


Sodium   


 


Potassium   


 


Chloride   Cancelled 


 


Carbon Dioxide   


 


Anion Gap   


 


BUN   


 


Creatinine   


 


Est GFR (African Amer)   


 


Glucose   


 


Serum Osmolality  255 L  


 


Calcium   


 


Urine Osmolality    768














  03/18/20 03/18/20





  04:08 04:08


 


WBC   18.6 H


 


RBC   3.78 L


 


Hgb   11.1 L


 


Hct   30.2 L


 


MCV   80


 


MCH   29.4


 


MCHC   36.7 H


 


RDW   15.8 H


 


Plt Count   207


 


Seg Neutrophils %   Not Reportable


 


Sodium  124.3 L 


 


Potassium  3.8 


 


Chloride  92 L 


 


Carbon Dioxide  27 


 


Anion Gap  5 


 


BUN  29 H 


 


Creatinine  0.49 L 


 


Est GFR (African Amer)  > 60 


 


Glucose  84 


 


Serum Osmolality  


 


Calcium  8.1 L 


 


Urine Osmolality  








                                        











  03/04/20 03/04/20 03/05/20





  21:51 21:51 00:20


 


Creatine Kinase  128  


 


Troponin I   0.057  0.152


 


NT-Pro-B Natriuret Pep   5170 H 














  03/07/20





  13:37


 


Creatine Kinase 


 


Troponin I 


 


NT-Pro-B Natriuret Pep  5250 H











Impressions: 


                                        





Modified Barium Swallow  03/12/20 00:00


IMPRESSION:  TRACE LARYNGEAL PENETRATION WITHOUT ASPIRATION. PLEASE SEE SPEECH 

PATHOLOGIST REPORT FOR OTHER FINDINGS AND RECOMMENDATIONS.


 














Assessment & Plan





- Diagnosis


(1) Hyponatremia


Is this a current diagnosis for this admission?: Yes   


Plan: 


His urine sodium is less than 5 but his urine osmolality is elevated at 768.  

His urine creatinine is 65.4.  His serum osmolality is 255 as expected.  Overall

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  He has a pretty good response to tolvaptan with 

improvement of his sodium level but is not within acceptable limits yet.  I will

give him another dose of tolvaptan 15 mg x 1 dose today.








(2) Influenza A


Is this a current diagnosis for this admission?: Yes   





(3) Atypical pneumonia


Is this a current diagnosis for this admission?: Yes   





(4) HTN (hypertension)


Is this a current diagnosis for this admission?: Yes   





(5) Dementia


Is this a current diagnosis for this admission?: Yes   





- Time


Time with patient: 15-25 minutes

## 2020-03-19 LAB
ABSOLUTE LYMPHOCYTES# (MANUAL): 0.2 10^3/UL (ref 0.5–4.7)
ABSOLUTE MONOCYTES # (MANUAL): 0.7 10^3/UL (ref 0.1–1.4)
ADD MANUAL DIFF: YES
ANION GAP SERPL CALC-SCNC: 6 MMOL/L (ref 5–19)
ANISOCYTOSIS BLD QL SMEAR: SLIGHT
BASOPHILS NFR BLD MANUAL: 0 % (ref 0–2)
BUN SERPL-MCNC: 24 MG/DL (ref 7–20)
CALCIUM: 7.6 MG/DL (ref 8.4–10.2)
CHLORIDE SERPL-SCNC: 98 MMOL/L (ref 98–107)
CO2 SERPL-SCNC: 27 MMOL/L (ref 22–30)
EOSINOPHIL NFR BLD MANUAL: 0 % (ref 0–6)
ERYTHROCYTE [DISTWIDTH] IN BLOOD BY AUTOMATED COUNT: 15.5 % (ref 11.5–14)
GLUCOSE SERPL-MCNC: 91 MG/DL (ref 75–110)
HCT VFR BLD CALC: 30.2 % (ref 37.9–51)
HGB BLD-MCNC: 10.8 G/DL (ref 13.5–17)
MCH RBC QN AUTO: 28.9 PG (ref 27–33.4)
MCHC RBC AUTO-ENTMCNC: 35.6 G/DL (ref 32–36)
MCV RBC AUTO: 81 FL (ref 80–97)
MONOCYTES % (MANUAL): 6 % (ref 3–13)
OVALOCYTES BLD QL SMEAR: SLIGHT
PLATELET # BLD: 171 10^3/UL (ref 150–450)
PLATELET COMMENT: ADEQUATE
POTASSIUM SERPL-SCNC: 3.5 MMOL/L (ref 3.6–5)
RBC # BLD AUTO: 3.72 10^6/UL (ref 4.35–5.55)
SEGMENTED NEUTROPHILS % (MAN): 92 % (ref 42–78)
TOTAL CELLS COUNTED BLD: 100
VARIANT LYMPHS NFR BLD MANUAL: 2 % (ref 13–45)
WBC # BLD AUTO: 12.2 10^3/UL (ref 4–10.5)

## 2020-03-19 RX ADMIN — METOPROLOL SUCCINATE SCH: 25 TABLET, EXTENDED RELEASE ORAL at 22:16

## 2020-03-19 RX ADMIN — LEVALBUTEROL HYDROCHLORIDE SCH MG: 0.63 SOLUTION RESPIRATORY (INHALATION) at 20:08

## 2020-03-19 RX ADMIN — IPRATROPIUM BROMIDE SCH MG: 0.5 SOLUTION RESPIRATORY (INHALATION) at 20:08

## 2020-03-19 RX ADMIN — FUROSEMIDE SCH MG: 10 INJECTION, SOLUTION INTRAMUSCULAR; INTRAVENOUS at 22:17

## 2020-03-19 RX ADMIN — FLUTICASONE FUROATE, UMECLIDINIUM BROMIDE AND VILANTEROL TRIFENATATE SCH INH: 100; 62.5; 25 POWDER RESPIRATORY (INHALATION) at 09:22

## 2020-03-19 RX ADMIN — POTASSIUM CHLORIDE SCH MEQ: 750 TABLET, FILM COATED, EXTENDED RELEASE ORAL at 09:21

## 2020-03-19 RX ADMIN — CEFEPIME HYDROCHLORIDE SCH MLS/HR: 1 INJECTION, SOLUTION INTRAVENOUS at 22:18

## 2020-03-19 RX ADMIN — METOPROLOL SUCCINATE SCH MG: 25 TABLET, EXTENDED RELEASE ORAL at 09:21

## 2020-03-19 RX ADMIN — NYSTATIN SCH UNIT: 100000 SUSPENSION ORAL at 05:14

## 2020-03-19 RX ADMIN — ENOXAPARIN SODIUM SCH MG: 40 INJECTION SUBCUTANEOUS at 09:22

## 2020-03-19 RX ADMIN — FAMOTIDINE SCH MG: 20 TABLET, FILM COATED ORAL at 09:21

## 2020-03-19 RX ADMIN — DILTIAZEM HYDROCHLORIDE SCH MG: 120 CAPSULE, COATED, EXTENDED RELEASE ORAL at 09:21

## 2020-03-19 RX ADMIN — FAMOTIDINE SCH MG: 20 TABLET, FILM COATED ORAL at 22:14

## 2020-03-19 RX ADMIN — MESALAMINE SCH MG: 250 CAPSULE ORAL at 17:27

## 2020-03-19 RX ADMIN — MESALAMINE SCH MG: 250 CAPSULE ORAL at 09:22

## 2020-03-19 RX ADMIN — NYSTATIN SCH UNIT: 100000 SUSPENSION ORAL at 01:11

## 2020-03-19 RX ADMIN — TAMSULOSIN HYDROCHLORIDE SCH MG: 0.4 CAPSULE ORAL at 22:14

## 2020-03-19 RX ADMIN — IPRATROPIUM BROMIDE SCH MG: 0.5 SOLUTION RESPIRATORY (INHALATION) at 13:20

## 2020-03-19 RX ADMIN — NYSTATIN SCH UNIT: 100000 SUSPENSION ORAL at 13:56

## 2020-03-19 RX ADMIN — NYSTATIN SCH UNIT: 100000 SUSPENSION ORAL at 17:28

## 2020-03-19 RX ADMIN — ATORVASTATIN CALCIUM SCH MG: 40 TABLET, FILM COATED ORAL at 22:16

## 2020-03-19 RX ADMIN — FERROUS SULFATE TAB 325 MG (65 MG ELEMENTAL FE) SCH MG: 325 (65 FE) TAB at 13:56

## 2020-03-19 RX ADMIN — DUTASTERIDE SCH MG: 0.5 CAPSULE, LIQUID FILLED ORAL at 09:22

## 2020-03-19 RX ADMIN — LISINOPRIL SCH MG: 10 TABLET ORAL at 09:20

## 2020-03-19 RX ADMIN — IPRATROPIUM BROMIDE SCH MG: 0.5 SOLUTION RESPIRATORY (INHALATION) at 08:32

## 2020-03-19 RX ADMIN — FUROSEMIDE SCH MG: 10 INJECTION, SOLUTION INTRAMUSCULAR; INTRAVENOUS at 09:39

## 2020-03-19 RX ADMIN — OXYCODONE HYDROCHLORIDE AND ACETAMINOPHEN SCH MG: 500 TABLET ORAL at 09:21

## 2020-03-19 RX ADMIN — FUROSEMIDE SCH: 10 INJECTION, SOLUTION INTRAMUSCULAR; INTRAVENOUS at 16:58

## 2020-03-19 RX ADMIN — MESALAMINE SCH MG: 250 CAPSULE ORAL at 13:56

## 2020-03-19 RX ADMIN — LEVALBUTEROL HYDROCHLORIDE SCH MG: 0.63 SOLUTION RESPIRATORY (INHALATION) at 13:19

## 2020-03-19 RX ADMIN — LEVALBUTEROL HYDROCHLORIDE SCH MG: 0.63 SOLUTION RESPIRATORY (INHALATION) at 08:32

## 2020-03-19 RX ADMIN — MESALAMINE SCH MG: 250 CAPSULE ORAL at 22:15

## 2020-03-19 RX ADMIN — DIGOXIN SCH MG: 0.12 TABLET ORAL at 17:28

## 2020-03-19 RX ADMIN — CEFEPIME HYDROCHLORIDE SCH MLS/HR: 1 INJECTION, SOLUTION INTRAVENOUS at 09:22

## 2020-03-19 NOTE — PDOC PROGRESS REPORT
Subjective


Progress Note for:: 03/19/20


Subjective:: 


LUIS F VELA is a 86 year old male with past medical history significant for 

chronic A. fib, HTN, HLD, COPD, Crohn's disease who presented to ED with severe 

respiratory distress that awoke him from sleep.  Patient contracted a viral 

upper respiratory illness proximately 3 days prior to admission and this became 

worse gradually causing productive cough shortness of breath/ROBERTSON.  Patient does 

not use home oxygen prior to admission.  He was requiring BiPAP to maintain 

saturations on admission.  Given steroids, nebulizers, antibiotics on admission.

 Chest x-ray did not specifically show pneumonia the lungs had scattered 

congestion on exam.  Patient noted to be in A. fib with RVR on admission with 

rates in the 150s and is promptly came down with diltiazem drip with a rate of 

10mg/h.  Daughter is in charge of patient's medications and states he has not 

missed any doses including his digoxin and diltiazem. 





3/14/2020.  No acute events overnight, patient complaining of congestion, dry 

mouth, and being very anxious, otherwise denies any fever, chills, nausea, vo

miting, diarrhea, constipation or any urinary symptoms.





3/15/2020.  No acute events overnight.  Patient comfortably resting in bed no 

apparent distress, denies being anxious, denies any fever, chills, nausea, 

vomiting, diarrhea, constipation or any urinary symptoms.  Alert and oriented 

x2.  P.o. tolerant.





3/16/2020.  No acute events overnight.  Significant improvement of respiratory 

symptoms, in no apparent distress, denies any fever, chills, nausea, vomiting, 

diarrhea, constipation or any urinary symptoms.  Pending placement.





3/17/2020.  No acute events overnight.  Patient currently resting in bed no 

apparent distress.  Denies any fever, chills, nausea, vomiting, diarrhea, 

constipation or any urinary symptoms.  Patient could potentially be discharged 

home as daughter does not want him to be placed in SNF at this point because of 

the fact that she will not be able to visit him at the SNF.  Patient is in 

agreement.  Unfortunately patient's sodium trending down and nephrology has been

consulted pending recommendations.





3/18/2020.  No acute events overnight.  Comfortably resting in bed no apparent 

distress, denies any fever, chills, nausea, vomiting, diarrhea, constipation or 

any urinary symptoms.  Serum sodium improving however still not within normal 

limits.





3/19/2020.  No acute events overnight.  Patient currently resting in bed no 

apparent distress, denies any fever, chills, nausea, vomiting, diarrhea, 

constipation or any urinary symptoms.  Unfortunately patient's sodium is still 

130 and would benefit from staying another day.


Reason For Visit: 


CAP,COPD EXACERBATION,ACUTE ON CHRONIC AFIB WITH








Physical Exam


Vital Signs: 


                                        











Temp Pulse Resp BP Pulse Ox


 


 98.2 F   100   18   120/31 L  86 L


 


 03/19/20 07:28  03/19/20 08:33  03/19/20 08:33  03/19/20 07:28  03/19/20 08:33








                                 Intake & Output











 03/18/20 03/19/20 03/20/20





 06:59 06:59 06:59


 


Intake Total 610 650 


 


Output Total 1925 1200 


 


Balance -1315 -550 


 


Weight 61 kg 58 kg 











General appearance: PRESENT: no acute distress, well-developed, well-nourished


Head exam: PRESENT: atraumatic, normocephalic


Respiratory exam: PRESENT: clear to auscultation say, crackles.  ABSENT: rales, 

rhonchi, wheezes


Cardiovascular exam: PRESENT: RRR.  ABSENT: diastolic murmur, rubs, systolic 

murmur


GI/Abdominal exam: PRESENT: normal bowel sounds, soft.  ABSENT: distended, 

guarding, mass, organolmegaly, rebound, tenderness


Extremities exam: PRESENT: full ROM.  ABSENT: calf tenderness, clubbing, pedal 

edema


Neurological exam: PRESENT: alert, awake, oriented to person, oriented to place,

CN II-XII grossly intact.  ABSENT: motor sensory deficit





Results


Laboratory Results: 


                                        





                                 03/19/20 04:25 





                                 03/19/20 04:25 





                                        











  03/19/20 03/19/20





  04:25 04:25


 


WBC  12.2 H 


 


RBC  3.72 L 


 


Hgb  10.8 L 


 


Hct  30.2 L 


 


MCV  81 


 


MCH  28.9 


 


MCHC  35.6 


 


RDW  15.5 H 


 


Plt Count  171 


 


Seg Neutrophils %  Not Reportable 


 


Sodium   130.5 L


 


Potassium   3.5 L


 


Chloride   98


 


Carbon Dioxide   27


 


Anion Gap   6


 


BUN   24 H


 


Creatinine   0.51 L


 


Est GFR (African Amer)   > 60


 


Glucose   91


 


Calcium   7.6 L








                                        











  03/04/20 03/04/20 03/05/20





  21:51 21:51 00:20


 


Creatine Kinase  128  


 


Troponin I   0.057  0.152


 


NT-Pro-B Natriuret Pep   5170 H 














  03/07/20





  13:37


 


Creatine Kinase 


 


Troponin I 


 


NT-Pro-B Natriuret Pep  5250 H











Impressions: 


                                        





Modified Barium Swallow  03/12/20 00:00


IMPRESSION:  TRACE LARYNGEAL PENETRATION WITHOUT ASPIRATION. PLEASE SEE SPEECH 

PATHOLOGIST REPORT FOR OTHER FINDINGS AND RECOMMENDATIONS.


 














Assessment and Plan





- Diagnosis


(1) Hyponatremia


Is this a current diagnosis for this admission?: Yes   


Plan: 


Sodium is 130 daily after receiving 2 doses of tolvaptan.  





Euvolemic hyponatremia.





Denies any vomiting or diarrhea.





Was on scheduled antipsychotics which has been DC'd for the last 3 days.





Lasix has been held for the last 3 days.  Has been on fluid restriction of 2 L 

per 24 hours for the last 3 days.





Not on SSRIs, PPIs or antiseizure meds.





Hold diuretics.  Nephrology on board.  Recommendations noted.  





Monitor for seizure.  Switch to regular diet briefly.








(2) Acute respiratory failure with hypoxia


Is this a current diagnosis for this admission?: Yes   


Plan: 


Moderate improvement.  SPO2 WNL on 2 L nasal cannula.  





Likely due to underlying upper respiratory function, atypical pneumonia, COPD 

and CHF exacerbation.





Received 5 days of IV steroids.  





Continue pulmonary toileting, duo nebs, BiPAP, LABA, ICS, LABA, empiric IV ant

ibiotics, incentive spirometry, flutter valve.








(3) Atrial fibrillation with RVR


Is this a current diagnosis for this admission?: Yes   


Plan: 


Rate controlled.


History of chronic persistent atrial fibrillation.


Worsening likely due to respiratory failure and hypoxia


Initially was started on IV diltiazem drip and transition to p.o.


Continue diltiazem, digoxin, beta-blockers.  Monitor dig level.


Outpatient PCP and cardiology follow-up.








(4) Atypical pneumonia


Is this a current diagnosis for this admission?: Yes   


Plan: 


SPO2 WNL on RA.  Afebrile. 


Worsening leukocytosis likely due to IV steroids for underlying COPD 

exacerbation.  


Sputum culture on admission positive for Streptococcus species.


Likely bacterial infection followed viral illness


Received complete dose of ceftriaxone/doxycycline (azithromycin cannot be used 

due to very long QTC on admission)


Oxygen as needed with goal 89 to 94%


Given the fact that patient was not improving ans was still hypoxic with 

significant neutrophilic leukocytosis patient have been treated for healthcare 

associated pneumonia.  





Day 6 IV antibiotics.


Day 6 IV cefepime.  


Received 3 days of IV vancomycin.  DC vancomycin 3/17/2020.








(5) CHF exacerbation


Qualifiers: 


   Heart failure type: systolic   Qualified Code(s): I50.23 - Acute on chronic 

systolic (congestive) heart failure   


Is this a current diagnosis for this admission?: Yes   


Plan: 


Compensated.


Presented with acute systolic heart failure


BNP elevated to > 5000 on admission.


Likely due to hospital-acquired pneumonia complicated by underlying A. fib RVR. 




3/12/2020 2D echo LVEF 50 to 55%.  Mild diastolic dysfunction.  RSVP 60 mmHg.





Cardiac diet, strict in and out, fluid restriction, diuretics, ACE, beta-

blockers, digoxin.








(6) COPD exacerbation


Is this a current diagnosis for this admission?: Yes   


Plan: 


Moderate improvement.  SPO2 WNL on RA.


History of non-oxygen dependent COPD.


Former smoker.


Plan as per #1.








(7) Crohn's disease


Is this a current diagnosis for this admission?: No   


Plan: 


Does not appear to be acutely exacerbated.


H&H stable.  Denies any melena or hematochezia.


Takes Pentasa at home


Resume home meds.


Outpatient PCP and gastroenterology follow-up.








(8) Dysphagia


Is this a current diagnosis for this admission?: Yes   


Plan: 


Status post modified barium swallow study.


Mechanical soft diet recommended.








(9) HTN (hypertension)


Is this a current diagnosis for this admission?: Yes   


Plan: 


Uncontrolled.





Home medications are: captopril 50 mg p.o. twice daily, diltiazem 120 mg p.o. 

daily, Lasix 20 mg p.o. twice daily, metoprolol 25 mg p.o. daily.





Switch captopril to lisinopril for ease of administration.


Continue diltiazem, Lasix, metoprolol.  PRN hydralazine and metoprolol.


Adjust meds as needed.  Outpatient PCP follow-up.








(10) Influenza A


Is this a current diagnosis for this admission?: Yes   


Plan: 


Patient tested positive for Haemophilus influenza type b on admission.


Unfortunately was not started on Tamiflu until 3/10/2020.


Completed a course of Tamiflu.








- Plan Summary


Summary: 


3/6/2020


Temperature 97.9 pulse 76 blood pressure 144/58 O2 sat 94% on 4 L


We will have nursing try to wean him back down to 3 L


Patient has no history of thyroid disease will recheck free T3 and free T4


Medications include Lasix 10 mg IV every 12 hours Solu-Medrol 40 mg IV every 12 

hours Vibramycin 100 mg every 12 hours and Rocephin 2 g daily


Plus other incidental medications


Patient states he is breathing slightly better than on admission.  Patient lives

at home with his daughter and son-in-law


WBCs on admission were 18,000 today 13,000


Chemistry is grossly normal


1 blood culture grew out Streptococcus species


Will consult discharge planning to assess patient's needs at time of discharge





3/7/2020


Patient is afebrile 97 7 pulse is still between 90 and 100 blood pressure is 

elevated 186/57, although earlier this morning it was 144/58


I am also going to increase his Apresoline to 20 mg as needed


Patient is on captopril 50 mg every 12 hours, Cardizem 60 mg every 8 hours, 

Lasix was increased to 20 mg IV every 12 hours, Solu-Medrol was increased to 40 

mg every 8 hours.


Patient was also on Toprol XL 25 mg daily prior to admission and I will resume 

this


Patient's oxygen saturation is between 91 and 97% on 2 L nasal cannula





Patient is on IV Vibramycin as well as Rocephin, sensitivities appear to be 

appropriate for these 2 drugs


Patient is making slow progress


Patient lives at home with family.  Patient states he was not on oxygen prior to

coming in


Chest x-ray does showed pneumonia and patient also was flu A+





3/8/2020


Patient is more agitated when he is awake although you can speak to him and he 

will calm down.  Patient pulled out his Ball catheter yesterday and caused a 

little bit of trauma resulting in hematuria which has cleared on its own.


Patient does much better when on the BiPAP but this does require Ativan


Overall blood pressures are starting to come down


ABGs done early this morning are actually fairly normal


White count on admission was 18,000 and is now now down to 7.8


Lactic acid remains normal





Had a long conversation yesterday with the patient's daughter, Danita, I 

explained to her how sick her father was and that this could be a life-

threatening illness.  Patient told me yesterday that "he would [facility] me [facility] 

[facility]".  


I think we should continue on the same course of treatment since he appears to 

be gradually improving


Portable chest x-ray done yesterday is actually improved from admission





3/9/2020


This morning his temp is 97 6 pulse is anywhere from  blood pressure 

148/57 respiratory respiratory rates 25-37 on BiPAP he is between 90 and 98% 

saturation


Patient had a blood gas yesterday which was not all that terribly abnormal


Chemistry panel appears stable


1 blood culture cultures appears to be growing out a strep mitis , patient 

currently on IV doxycycline and IV Rocephin





Patient is requiring IV Haldol to keep him in the bed.  This morning I talked to

respiratory therapy about trying to switch him over to nasal cannula as he 

appears to be more anxious and agitated with the mask on than without it.  We 

will see how that goes





Patient's mental baseline was probably of some mild dementia certainly not is 

confused and altered as he is now.  I think patient's pulmonary status prior to 

admission was also marginal.





Patient's chest x-ray from 2 days ago really still shows just a cardiomegaly 

which is impressive but no acute pulmonary problems.


I will consult cardiology today to see if they have any insight

## 2020-03-19 NOTE — PDOC PROGRESS REPORT
Subjective


Progress Note for:: 03/19/20


Subjective:: 





Patient is lying down comfortably in his bed.  He states that he feels fine and 

does not have any new complaints.  He made about 1200 mL of urine output for the

past 24 hours.


Reason For Visit: 


CAP,COPD EXACERBATION,ACUTE ON CHRONIC AFIB WITH








Physical Exam


Vital Signs: 


                                        











Temp Pulse Resp BP Pulse Ox


 


 98.2 F   100   18   120/31 L  86 L


 


 03/19/20 07:28  03/19/20 08:33  03/19/20 08:33  03/19/20 07:28  03/19/20 08:33








                                 Intake & Output











 03/18/20 03/19/20 03/20/20





 06:59 06:59 06:59


 


Intake Total 610 650 


 


Output Total 1925 1200 


 


Balance -1315 -550 


 


Weight 61 kg 58 kg 











Exam: 





General appearance: PRESENT: no acute distress, cooperative, well-developed, 

well-nourished


Head exam: PRESENT: atraumatic, normocephalic


Eye exam: PRESENT: conjunctiva pink, PERRLA.  ABSENT: scleral icterus


Neck exam: ABSENT: JVD


Respiratory exam: PRESENT: Coarse breath sounds.  ABSENT: crackles, rales, 

rhonchi, unlabored, wheezes


Cardiovascular exam: PRESENT: Regular rate rhythm -+S1, +S2.  ABSENT: diastolic 

murmur, systolic murmur


GI/Abdominal exam: PRESENT: normal bowel sounds, soft.  ABSENT: guarding, mass, 

tenderness


Extremities exam: Bilateral ankle edema


Neurological exam: PRESENT: alert, awake, oriented to person, place and time.


Skin exam: PRESENT: dry, warm,





Results


Laboratory Results: 


                                        





                                 03/19/20 04:25 





                                 03/19/20 04:25 





                                        











  03/19/20 03/19/20





  04:25 04:25


 


WBC  12.2 H 


 


RBC  3.72 L 


 


Hgb  10.8 L 


 


Hct  30.2 L 


 


MCV  81 


 


MCH  28.9 


 


MCHC  35.6 


 


RDW  15.5 H 


 


Plt Count  171 


 


Seg Neutrophils %  Not Reportable 


 


Sodium   130.5 L


 


Potassium   3.5 L


 


Chloride   98


 


Carbon Dioxide   27


 


Anion Gap   6


 


BUN   24 H


 


Creatinine   0.51 L


 


Est GFR (African Amer)   > 60


 


Glucose   91


 


Calcium   7.6 L








                                        











  03/04/20 03/04/20 03/05/20





  21:51 21:51 00:20


 


Creatine Kinase  128  


 


Troponin I   0.057  0.152


 


NT-Pro-B Natriuret Pep   5170 H 














  03/07/20





  13:37


 


Creatine Kinase 


 


Troponin I 


 


NT-Pro-B Natriuret Pep  5250 H











Impressions: 


                                        





Modified Barium Swallow  03/12/20 00:00


IMPRESSION:  TRACE LARYNGEAL PENETRATION WITHOUT ASPIRATION. PLEASE SEE SPEECH 

PATHOLOGIST REPORT FOR OTHER FINDINGS AND RECOMMENDATIONS.


 














Assessment & Plan





- Diagnosis


(1) Hyponatremia


Is this a current diagnosis for this admission?: Yes   


Plan: 


His urine sodium is less than 5 but his urine osmolality is elevated at 768.  

His urine creatinine is 65.4.  His serum osmolality is 255 as expected.  Overall

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  He has a pretty good response to tolvaptan given

for the past 2 days with improvement of his sodium level but is not within 

acceptable limits yet.  No further the tolvaptan needed at this time.  Recheck 

the patient's sodium level tomorrow.








(2) Influenza A


Is this a current diagnosis for this admission?: Yes   





(3) Atypical pneumonia


Is this a current diagnosis for this admission?: Yes   





(4) HTN (hypertension)


Is this a current diagnosis for this admission?: Yes   


Plan: 


Controlled.








(5) Dementia


Is this a current diagnosis for this admission?: Yes   





- Time


Time with patient: 15-25 minutes

## 2020-03-20 LAB
ABSOLUTE LYMPHOCYTES# (MANUAL): 0.3 10^3/UL (ref 0.5–4.7)
ABSOLUTE MONOCYTES # (MANUAL): 0.1 10^3/UL (ref 0.1–1.4)
ADD MANUAL DIFF: YES
ANION GAP SERPL CALC-SCNC: 5 MMOL/L (ref 5–19)
ANISOCYTOSIS BLD QL SMEAR: SLIGHT
BASOPHILS NFR BLD MANUAL: 0 % (ref 0–2)
BUN SERPL-MCNC: 17 MG/DL (ref 7–20)
BURR CELLS BLD QL SMEAR: (no result)
CALCIUM: 7.2 MG/DL (ref 8.4–10.2)
CHLORIDE SERPL-SCNC: 98 MMOL/L (ref 98–107)
CO2 SERPL-SCNC: 25 MMOL/L (ref 22–30)
EOSINOPHIL NFR BLD MANUAL: 0 % (ref 0–6)
ERYTHROCYTE [DISTWIDTH] IN BLOOD BY AUTOMATED COUNT: 15.4 % (ref 11.5–14)
GLUCOSE SERPL-MCNC: 102 MG/DL (ref 75–110)
HCT VFR BLD CALC: 26.9 % (ref 37.9–51)
HGB BLD-MCNC: 9.6 G/DL (ref 13.5–17)
MCH RBC QN AUTO: 29.2 PG (ref 27–33.4)
MCHC RBC AUTO-ENTMCNC: 35.9 G/DL (ref 32–36)
MCV RBC AUTO: 81 FL (ref 80–97)
MONOCYTES % (MANUAL): 1 % (ref 3–13)
NEUTS BAND NFR BLD MANUAL: 2 % (ref 3–5)
NRBC BLD AUTO-RTO: 0 /100 WBC
OVALOCYTES BLD QL SMEAR: SLIGHT
PLATELET # BLD: 165 10^3/UL (ref 150–450)
PLATELET COMMENT: ADEQUATE
POIKILOCYTOSIS BLD QL SMEAR: SLIGHT
POTASSIUM SERPL-SCNC: 3.3 MMOL/L (ref 3.6–5)
RBC # BLD AUTO: 3.3 10^6/UL (ref 4.35–5.55)
SCHISTOCYTES BLD QL SMEAR: SLIGHT
SEGMENTED NEUTROPHILS % (MAN): 94 % (ref 42–78)
TOTAL CELLS COUNTED BLD: 100
TOXIC GRANULES BLD QL SMEAR: SLIGHT
VARIANT LYMPHS NFR BLD MANUAL: 3 % (ref 13–45)
WBC # BLD AUTO: 8.9 10^3/UL (ref 4–10.5)

## 2020-03-20 RX ADMIN — NYSTATIN SCH UNIT: 100000 SUSPENSION ORAL at 17:11

## 2020-03-20 RX ADMIN — IPRATROPIUM BROMIDE SCH MG: 0.5 SOLUTION RESPIRATORY (INHALATION) at 20:17

## 2020-03-20 RX ADMIN — Medication SCH MG: at 10:25

## 2020-03-20 RX ADMIN — DILTIAZEM HYDROCHLORIDE SCH MG: 120 CAPSULE, COATED, EXTENDED RELEASE ORAL at 10:25

## 2020-03-20 RX ADMIN — LISINOPRIL SCH MG: 10 TABLET ORAL at 10:25

## 2020-03-20 RX ADMIN — LEVALBUTEROL HYDROCHLORIDE SCH MG: 0.63 SOLUTION RESPIRATORY (INHALATION) at 20:17

## 2020-03-20 RX ADMIN — MESALAMINE SCH MG: 250 CAPSULE ORAL at 22:00

## 2020-03-20 RX ADMIN — ATORVASTATIN CALCIUM SCH MG: 40 TABLET, FILM COATED ORAL at 21:59

## 2020-03-20 RX ADMIN — NYSTATIN SCH UNIT: 100000 SUSPENSION ORAL at 00:15

## 2020-03-20 RX ADMIN — IPRATROPIUM BROMIDE SCH MG: 0.5 SOLUTION RESPIRATORY (INHALATION) at 14:09

## 2020-03-20 RX ADMIN — METOPROLOL SUCCINATE SCH MG: 25 TABLET, EXTENDED RELEASE ORAL at 10:25

## 2020-03-20 RX ADMIN — FAMOTIDINE SCH MG: 20 TABLET, FILM COATED ORAL at 21:59

## 2020-03-20 RX ADMIN — Medication SCH MG: at 21:59

## 2020-03-20 RX ADMIN — NYSTATIN SCH UNIT: 100000 SUSPENSION ORAL at 05:13

## 2020-03-20 RX ADMIN — LEVALBUTEROL HYDROCHLORIDE SCH MG: 0.63 SOLUTION RESPIRATORY (INHALATION) at 14:09

## 2020-03-20 RX ADMIN — LEVALBUTEROL HYDROCHLORIDE SCH MG: 0.63 SOLUTION RESPIRATORY (INHALATION) at 09:18

## 2020-03-20 RX ADMIN — TAMSULOSIN HYDROCHLORIDE SCH MG: 0.4 CAPSULE ORAL at 21:59

## 2020-03-20 RX ADMIN — MESALAMINE SCH MG: 250 CAPSULE ORAL at 15:30

## 2020-03-20 RX ADMIN — MESALAMINE SCH MG: 250 CAPSULE ORAL at 17:15

## 2020-03-20 RX ADMIN — FERROUS SULFATE TAB 325 MG (65 MG ELEMENTAL FE) SCH: 325 (65 FE) TAB at 14:03

## 2020-03-20 RX ADMIN — FAMOTIDINE SCH MG: 20 TABLET, FILM COATED ORAL at 10:25

## 2020-03-20 RX ADMIN — DUTASTERIDE SCH MG: 0.5 CAPSULE, LIQUID FILLED ORAL at 10:25

## 2020-03-20 RX ADMIN — NYSTATIN SCH: 100000 SUSPENSION ORAL at 14:03

## 2020-03-20 RX ADMIN — ACETAMINOPHEN PRN MG: 325 TABLET ORAL at 17:11

## 2020-03-20 RX ADMIN — METOPROLOL SUCCINATE SCH MG: 25 TABLET, EXTENDED RELEASE ORAL at 21:59

## 2020-03-20 RX ADMIN — IPRATROPIUM BROMIDE SCH MG: 0.5 SOLUTION RESPIRATORY (INHALATION) at 09:14

## 2020-03-20 RX ADMIN — FLUTICASONE FUROATE, UMECLIDINIUM BROMIDE AND VILANTEROL TRIFENATATE SCH INH: 100; 62.5; 25 POWDER RESPIRATORY (INHALATION) at 10:33

## 2020-03-20 RX ADMIN — MESALAMINE SCH MG: 250 CAPSULE ORAL at 10:24

## 2020-03-20 RX ADMIN — OXYCODONE HYDROCHLORIDE AND ACETAMINOPHEN SCH MG: 500 TABLET ORAL at 10:24

## 2020-03-20 RX ADMIN — POTASSIUM CHLORIDE SCH MEQ: 750 TABLET, FILM COATED, EXTENDED RELEASE ORAL at 10:25

## 2020-03-20 RX ADMIN — ENOXAPARIN SODIUM SCH MG: 40 INJECTION SUBCUTANEOUS at 10:25

## 2020-03-20 RX ADMIN — DIGOXIN SCH MG: 0.12 TABLET ORAL at 17:11

## 2020-03-20 NOTE — PDOC PROGRESS REPORT
Subjective


Progress Note for:: 03/20/20


Subjective:: 


LUIS F VELA is a 86 year old male with past medical history significant for 

chronic A. fib, HTN, HLD, COPD, Crohn's disease who presented to ED with severe 

respiratory distress that awoke him from sleep.  Patient contracted a viral 

upper respiratory illness proximately 3 days prior to admission and this became 

worse gradually causing productive cough shortness of breath/ROBERTSON.  Patient does 

not use home oxygen prior to admission.  He was requiring BiPAP to maintain 

saturations on admission.  Given steroids, nebulizers, antibiotics on admission.

 Chest x-ray did not specifically show pneumonia the lungs had scattered 

congestion on exam.  Patient noted to be in A. fib with RVR on admission with 

rates in the 150s and is promptly came down with diltiazem drip with a rate of 

10mg/h.  Daughter is in charge of patient's medications and states he has not 

missed any doses including his digoxin and diltiazem. 





3/14/2020.  No acute events overnight, patient complaining of congestion, dry 

mouth, and being very anxious, otherwise denies any fever, chills, nausea, vo

miting, diarrhea, constipation or any urinary symptoms.





3/15/2020.  No acute events overnight.  Patient comfortably resting in bed no 

apparent distress, denies being anxious, denies any fever, chills, nausea, 

vomiting, diarrhea, constipation or any urinary symptoms.  Alert and oriented 

x2.  P.o. tolerant.





3/16/2020.  No acute events overnight.  Significant improvement of respiratory 

symptoms, in no apparent distress, denies any fever, chills, nausea, vomiting, 

diarrhea, constipation or any urinary symptoms.  Pending placement.





3/17/2020.  No acute events overnight.  Patient currently resting in bed no 

apparent distress.  Denies any fever, chills, nausea, vomiting, diarrhea, 

constipation or any urinary symptoms.  Patient could potentially be discharged 

home as daughter does not want him to be placed in SNF at this point because of 

the fact that she will not be able to visit him at the SNF.  Patient is in 

agreement.  Unfortunately patient's sodium trending down and nephrology has been

consulted pending recommendations.





3/18/2020.  No acute events overnight.  Comfortably resting in bed no apparent 

distress, denies any fever, chills, nausea, vomiting, diarrhea, constipation or 

any urinary symptoms.  Serum sodium improving however still not within normal 

limits.





3/19/2020.  No acute events overnight.  Patient currently resting in bed no 

apparent distress, denies any fever, chills, nausea, vomiting, diarrhea, 

constipation or any urinary symptoms.  Unfortunately patient's sodium is still 

130 and would benefit from staying another day.





3/20/2020.  No acute events overnight.  Unfortunately patient sodium is still 

not optimized, family would like for patient to be transferred to SNF, patient 

currently resting in bed no apparent distress, denies any fever, chills, nausea,

vomiting, diarrhea, constipation or any urinary symptoms.


Reason For Visit: 


CAP,COPD EXACERBATION,ACUTE ON CHRONIC AFIB WITH








Physical Exam


Vital Signs: 


                                        











Temp Pulse Resp BP Pulse Ox


 


 97.6 F   79   18   127/43 H  94 


 


 03/20/20 07:38  03/20/20 09:19  03/20/20 09:19  03/20/20 07:38  03/20/20 09:19








                                 Intake & Output











 03/19/20 03/20/20 03/21/20





 06:59 06:59 06:59


 


Intake Total 650 655 


 


Output Total 1200 1250 


 


Balance -550 -595 


 


Weight 58 kg 56.8 kg 











General appearance: PRESENT: no acute distress, well-developed, well-nourished


Head exam: PRESENT: atraumatic, normocephalic


Neck exam: ABSENT: carotid bruit, JVD, lymphadenopathy, thyromegaly


Respiratory exam: PRESENT: clear to auscultation say.  ABSENT: rales, rhonchi, 

wheezes


Cardiovascular exam: PRESENT: RRR.  ABSENT: diastolic murmur, rubs, systolic 

murmur


GI/Abdominal exam: PRESENT: normal bowel sounds, soft.  ABSENT: distended, 

guarding, mass, organolmegaly, rebound, tenderness


Neurological exam: PRESENT: alert, awake, oriented to person, oriented to place,

CN II-XII grossly intact.  ABSENT: motor sensory deficit





Results


Laboratory Results: 


                                        





                                 03/20/20 04:25 





                                 03/20/20 04:25 





                                        











  03/20/20 03/20/20





  04:25 04:25


 


WBC  8.9 


 


RBC  3.30 L 


 


Hgb  9.6 L 


 


Hct  26.9 L 


 


MCV  81 


 


MCH  29.2 


 


MCHC  35.9 


 


RDW  15.4 H 


 


Plt Count  165 


 


Seg Neutrophils %  Not Reportable 


 


Sodium   128.2 L


 


Potassium   3.3 L


 


Chloride   98


 


Carbon Dioxide   25


 


Anion Gap   5


 


BUN   17


 


Creatinine   0.40 L


 


Est GFR (African Amer)   > 60


 


Glucose   102


 


Calcium   7.2 L








                                        











  03/04/20 03/04/20 03/05/20





  21:51 21:51 00:20


 


Creatine Kinase  128  


 


Troponin I   0.057  0.152


 


NT-Pro-B Natriuret Pep   5170 H 














  03/07/20





  13:37


 


Creatine Kinase 


 


Troponin I 


 


NT-Pro-B Natriuret Pep  5250 H











Impressions: 


                                        





Modified Barium Swallow  03/12/20 00:00


IMPRESSION:  TRACE LARYNGEAL PENETRATION WITHOUT ASPIRATION. PLEASE SEE SPEECH 

PATHOLOGIST REPORT FOR OTHER FINDINGS AND RECOMMENDATIONS.


 














Assessment and Plan





- Diagnosis


(1) Hyponatremia


Is this a current diagnosis for this admission?: Yes   


Plan: 


Improving.  Not optimized.  





Has received 2 doses of tolvaptan.  





Euvolemic hyponatremia.





Denies any vomiting or diarrhea.





Was on scheduled antipsychotics which has been DC'd for the last 3 days.





Lasix has been held for the last 3 days.  Has been on fluid restriction of 2 L 

per 24 hours for the last 3 days.





Not on SSRIs, PPIs or antiseizure meds.





Hold diuretics.  Nephrology on board.  Recommendations noted.  





Monitor for seizure.  Switch to regular diet briefly.








(2) Acute respiratory failure with hypoxia


Is this a current diagnosis for this admission?: Yes   


Plan: 


Moderate improvement.  SPO2 WNL on 2 L nasal cannula.  





Likely due to underlying upper respiratory function, atypical pneumonia, COPD 

and CHF exacerbation.





Received 5 days of IV steroids.  


Received 6 days of empiric IV antibiotics.





Continue pulmonary toileting, duo nebs, BiPAP, LABA, ICS, LABA, incentive 

spirometry, flutter valve.








(3) Atrial fibrillation with RVR


Is this a current diagnosis for this admission?: Yes   


Plan: 


Rate controlled.


History of chronic persistent atrial fibrillation.


Worsening likely due to respiratory failure and hypoxia


Initially was started on IV diltiazem drip and transition to p.o.


Continue diltiazem, digoxin, beta-blockers.  Monitor dig level.


Outpatient PCP and cardiology follow-up.








(4) Atypical pneumonia


Is this a current diagnosis for this admission?: Yes   


Plan: 


SPO2 WNL on 2 L. Afebrile.  WBC WNL.


Sputum culture on admission positive for Streptococcus species.


Likely bacterial infection followed viral illness


Received complete dose of ceftriaxone/doxycycline (azithromycin cannot be used 

due to very long QTC on admission)


Given the fact that patient was not improving and was still hypoxic with 

significant neutrophilic leukocytosis patient was started on broad-spectrum 

empiric IV antibiotics for healthcare associated pneumonia.  





Received 6 days of IV antibiotics.


DC IV antibiotics.  


Received 3 days of IV vancomycin.  DC vancomycin 3/17/2020.








(5) CHF exacerbation


Qualifiers: 


   Heart failure type: systolic   Qualified Code(s): I50.23 - Acute on chronic 

systolic (congestive) heart failure   


Is this a current diagnosis for this admission?: Yes   


Plan: 


Compensated.


Presented with acute systolic heart failure


BNP elevated to > 5000 on admission.


Likely due to hospital-acquired pneumonia complicated by underlying A. fib RVR. 




3/12/2020 2D echo LVEF 50 to 55%.  Mild diastolic dysfunction.  RSVP 60 mmHg.





Cardiac diet, strict in and out, fluid restriction, diuretics, ACE, beta-block

ers, digoxin.








(6) COPD exacerbation


Is this a current diagnosis for this admission?: Yes   


Plan: 


Moderate improvement.  SPO2 WNL on RA.


History of non-oxygen dependent COPD.


Former smoker.


Plan as per #1.








(7) Crohn's disease


Is this a current diagnosis for this admission?: No   


Plan: 


Does not appear to be acutely exacerbated.


H&H stable.  Denies any melena or hematochezia.


Takes Pentasa at home


Resume home meds.


Outpatient PCP and gastroenterology follow-up.








(8) Dysphagia


Is this a current diagnosis for this admission?: Yes   


Plan: 


Status post modified barium swallow study.


Mechanical soft diet recommended.








(9) HTN (hypertension)


Is this a current diagnosis for this admission?: Yes   


Plan: 


Euvolemic.  Normotensive.  





Home medications are: captopril 50 mg p.o. twice daily, diltiazem 120 mg p.o. 

daily, Lasix 20 mg p.o. twice daily, metoprolol 25 mg p.o. daily.





Switch captopril to lisinopril for ease of administration.


Continue diltiazem, lisinopril, metoprolol.  PRN hydralazine and metoprolol.


Hold Lasix due to persistent hyponatremia. 


Adjust meds as needed.  Outpatient PCP follow-up.








(10) Influenza A


Is this a current diagnosis for this admission?: Yes   


Plan: 


Patient tested positive for Haemophilus influenza type b on admission.


Unfortunately was not started on Tamiflu until 3/10/2020.


Completed a course of Tamiflu.








- Plan Summary


Summary: 


3/6/2020


Temperature 97.9 pulse 76 blood pressure 144/58 O2 sat 94% on 4 L


We will have nursing try to wean him back down to 3 L


Patient has no history of thyroid disease will recheck free T3 and free T4


Medications include Lasix 10 mg IV every 12 hours Solu-Medrol 40 mg IV every 12 

hours Vibramycin 100 mg every 12 hours and Rocephin 2 g daily


Plus other incidental medications


Patient states he is breathing slightly better than on admission.  Patient lives

at home with his daughter and son-in-law


WBCs on admission were 18,000 today 13,000


Chemistry is grossly normal


1 blood culture grew out Streptococcus species


Will consult discharge planning to assess patient's needs at time of discharge





3/7/2020


Patient is afebrile 97 7 pulse is still between 90 and 100 blood pressure is 

elevated 186/57, although earlier this morning it was 144/58


I am also going to increase his Apresoline to 20 mg as needed


Patient is on captopril 50 mg every 12 hours, Cardizem 60 mg every 8 hours, 

Lasix was increased to 20 mg IV every 12 hours, Solu-Medrol was increased to 40 

mg every 8 hours.


Patient was also on Toprol XL 25 mg daily prior to admission and I will resume 

this


Patient's oxygen saturation is between 91 and 97% on 2 L nasal cannula





Patient is on IV Vibramycin as well as Rocephin, sensitivities appear to be 

appropriate for these 2 drugs


Patient is making slow progress


Patient lives at home with family.  Patient states he was not on oxygen prior to

coming in


Chest x-ray does showed pneumonia and patient also was flu A+





3/8/2020


Patient is more agitated when he is awake although you can speak to him and he 

will calm down.  Patient pulled out his Ball catheter yesterday and caused a 

little bit of trauma resulting in hematuria which has cleared on its own.


Patient does much better when on the BiPAP but this does require Ativan


Overall blood pressures are starting to come down


ABGs done early this morning are actually fairly normal


White count on admission was 18,000 and is now now down to 7.8


Lactic acid remains normal





Had a long conversation yesterday with the patient's daughter, Danita, I 

explained to her how sick her father was and that this could be a life-

threatening illness.  Patient told me yesterday that "he would see me in 

[facility]".  


I think we should continue on the same course of treatment since he appears to 

be gradually improving


Portable chest x-ray done yesterday is actually improved from admission





3/9/2020


This morning his temp is 97 6 pulse is anywhere from  blood pressure 

148/57 respiratory respiratory rates 25-37 on BiPAP he is between 90 and 98% 

saturation


Patient had a blood gas yesterday which was not all that terribly abnormal


Chemistry panel appears stable


1 blood culture cultures appears to be growing out a strep mitis , patient 

currently on IV doxycycline and IV Rocephin





Patient is requiring IV Haldol to keep him in the bed.  This morning I talked to

respiratory therapy about trying to switch him over to nasal cannula as he 

appears to be more anxious and agitated with the mask on than without it.  We 

will see how that goes





Patient's mental baseline was probably of some mild dementia certainly not is 

confused and altered as he is now.  I think patient's pulmonary status prior to 

admission was also marginal.





Patient's chest x-ray from 2 days ago really still shows just a cardiomegaly 

which is impressive but no acute pulmonary problems.


I will consult cardiology today to see if they have any insight

## 2020-03-21 LAB
ANION GAP SERPL CALC-SCNC: 2 MMOL/L (ref 5–19)
BUN SERPL-MCNC: 16 MG/DL (ref 7–20)
CALCIUM: 7.1 MG/DL (ref 8.4–10.2)
CHLORIDE SERPL-SCNC: 99 MMOL/L (ref 98–107)
CO2 SERPL-SCNC: 28 MMOL/L (ref 22–30)
GLUCOSE SERPL-MCNC: 87 MG/DL (ref 75–110)
POTASSIUM SERPL-SCNC: 3.6 MMOL/L (ref 3.6–5)

## 2020-03-21 RX ADMIN — LISINOPRIL SCH MG: 10 TABLET ORAL at 10:47

## 2020-03-21 RX ADMIN — ACETAMINOPHEN PRN MG: 325 TABLET ORAL at 20:15

## 2020-03-21 RX ADMIN — MESALAMINE SCH: 250 CAPSULE ORAL at 21:54

## 2020-03-21 RX ADMIN — LEVALBUTEROL HYDROCHLORIDE SCH MG: 0.63 SOLUTION RESPIRATORY (INHALATION) at 13:57

## 2020-03-21 RX ADMIN — LEVALBUTEROL HYDROCHLORIDE SCH MG: 0.63 SOLUTION RESPIRATORY (INHALATION) at 19:22

## 2020-03-21 RX ADMIN — METOPROLOL SUCCINATE SCH MG: 25 TABLET, EXTENDED RELEASE ORAL at 10:48

## 2020-03-21 RX ADMIN — IPRATROPIUM BROMIDE SCH MG: 0.5 SOLUTION RESPIRATORY (INHALATION) at 19:22

## 2020-03-21 RX ADMIN — IPRATROPIUM BROMIDE SCH MG: 0.5 SOLUTION RESPIRATORY (INHALATION) at 08:04

## 2020-03-21 RX ADMIN — DIGOXIN SCH MG: 0.12 TABLET ORAL at 17:42

## 2020-03-21 RX ADMIN — NYSTATIN SCH UNIT: 100000 SUSPENSION ORAL at 05:36

## 2020-03-21 RX ADMIN — Medication SCH MG: at 10:47

## 2020-03-21 RX ADMIN — MESALAMINE SCH MG: 250 CAPSULE ORAL at 10:49

## 2020-03-21 RX ADMIN — NYSTATIN SCH UNIT: 100000 SUSPENSION ORAL at 12:00

## 2020-03-21 RX ADMIN — FAMOTIDINE SCH: 20 TABLET, FILM COATED ORAL at 21:54

## 2020-03-21 RX ADMIN — NYSTATIN SCH UNIT: 100000 SUSPENSION ORAL at 17:47

## 2020-03-21 RX ADMIN — METOPROLOL SUCCINATE SCH: 25 TABLET, EXTENDED RELEASE ORAL at 21:54

## 2020-03-21 RX ADMIN — POTASSIUM CHLORIDE SCH MEQ: 750 TABLET, FILM COATED, EXTENDED RELEASE ORAL at 10:48

## 2020-03-21 RX ADMIN — MESALAMINE SCH MG: 250 CAPSULE ORAL at 17:40

## 2020-03-21 RX ADMIN — NYSTATIN SCH: 100000 SUSPENSION ORAL at 23:19

## 2020-03-21 RX ADMIN — FERROUS SULFATE TAB 325 MG (65 MG ELEMENTAL FE) SCH MG: 325 (65 FE) TAB at 14:31

## 2020-03-21 RX ADMIN — Medication SCH: at 21:54

## 2020-03-21 RX ADMIN — TAMSULOSIN HYDROCHLORIDE SCH: 0.4 CAPSULE ORAL at 21:54

## 2020-03-21 RX ADMIN — FLUTICASONE FUROATE, UMECLIDINIUM BROMIDE AND VILANTEROL TRIFENATATE SCH INH: 100; 62.5; 25 POWDER RESPIRATORY (INHALATION) at 10:48

## 2020-03-21 RX ADMIN — ATORVASTATIN CALCIUM SCH: 40 TABLET, FILM COATED ORAL at 21:54

## 2020-03-21 RX ADMIN — ENOXAPARIN SODIUM SCH MG: 40 INJECTION SUBCUTANEOUS at 10:47

## 2020-03-21 RX ADMIN — IPRATROPIUM BROMIDE SCH MG: 0.5 SOLUTION RESPIRATORY (INHALATION) at 13:57

## 2020-03-21 RX ADMIN — MESALAMINE SCH MG: 250 CAPSULE ORAL at 14:29

## 2020-03-21 RX ADMIN — FAMOTIDINE SCH: 20 TABLET, FILM COATED ORAL at 14:31

## 2020-03-21 RX ADMIN — NYSTATIN SCH UNIT: 100000 SUSPENSION ORAL at 03:04

## 2020-03-21 RX ADMIN — OXYCODONE HYDROCHLORIDE AND ACETAMINOPHEN SCH MG: 500 TABLET ORAL at 10:47

## 2020-03-21 RX ADMIN — LEVALBUTEROL HYDROCHLORIDE SCH MG: 0.63 SOLUTION RESPIRATORY (INHALATION) at 08:07

## 2020-03-21 RX ADMIN — DUTASTERIDE SCH MG: 0.5 CAPSULE, LIQUID FILLED ORAL at 10:49

## 2020-03-21 NOTE — PDOC PROGRESS REPORT
Subjective


Progress Note for:: 03/21/20


Subjective:: 


LUIS F VELA is a 86 year old male with past medical history significant for 

chronic A. fib, HTN, HLD, COPD, Crohn's disease who presented to ED with severe 

respiratory distress that awoke him from sleep.  Patient contracted a viral 

upper respiratory illness proximately 3 days prior to admission and this became 

worse gradually causing productive cough shortness of breath/ROBERTSON.  Patient does 

not use home oxygen prior to admission.  He was requiring BiPAP to maintain 

saturations on admission.  Given steroids, nebulizers, antibiotics on admission.

 Chest x-ray did not specifically show pneumonia the lungs had scattered 

congestion on exam.  Patient noted to be in A. fib with RVR on admission with 

rates in the 150s and is promptly came down with diltiazem drip with a rate of 

10mg/h.  Daughter is in charge of patient's medications and states he has not 

missed any doses including his digoxin and diltiazem. 





3/14/2020.  No acute events overnight, patient complaining of congestion, dry 

mouth, and being very anxious, otherwise denies any fever, chills, nausea, vo

miting, diarrhea, constipation or any urinary symptoms.





3/15/2020.  No acute events overnight.  Patient comfortably resting in bed no 

apparent distress, denies being anxious, denies any fever, chills, nausea, 

vomiting, diarrhea, constipation or any urinary symptoms.  Alert and oriented 

x2.  P.o. tolerant.





3/16/2020.  No acute events overnight.  Significant improvement of respiratory 

symptoms, in no apparent distress, denies any fever, chills, nausea, vomiting, 

diarrhea, constipation or any urinary symptoms.  Pending placement.





3/17/2020.  No acute events overnight.  Patient currently resting in bed no 

apparent distress.  Denies any fever, chills, nausea, vomiting, diarrhea, 

constipation or any urinary symptoms.  Patient could potentially be discharged 

home as daughter does not want him to be placed in SNF at this point because of 

the fact that she will not be able to visit him at the SNF.  Patient is in 

agreement.  Unfortunately patient's sodium trending down and nephrology has been

consulted pending recommendations.





3/18/2020.  No acute events overnight.  Comfortably resting in bed no apparent 

distress, denies any fever, chills, nausea, vomiting, diarrhea, constipation or 

any urinary symptoms.  Serum sodium improving however still not within normal 

limits.





3/19/2020.  No acute events overnight.  Patient currently resting in bed no 

apparent distress, denies any fever, chills, nausea, vomiting, diarrhea, 

constipation or any urinary symptoms.  Unfortunately patient's sodium is still 

130 and would benefit from staying another day.





3/20/2020.  No acute events overnight.  Unfortunately patient sodium is still 

not optimized, family would like for patient to be transferred to SNF, patient 

currently resting in bed no apparent distress, denies any fever, chills, nausea,

vomiting, diarrhea, constipation or any urinary symptoms.





3/21/2020.  No acute events overnight.  Patient comfortably sitting in bed no 

apparent distress.  Denies any fever, chills, nausea, vomiting, diarrhea, 

constipation or any urinary symptoms.  Unfortunately sodium is still not 

optimized.


Reason For Visit: 


CAP,COPD EXACERBATION,ACUTE ON CHRONIC AFIB WITH








Physical Exam


Vital Signs: 


                                        











Temp Pulse Resp BP Pulse Ox


 


 97.3 F   84   20   154/52 H  95 


 


 03/21/20 08:18  03/21/20 08:18  03/21/20 08:18  03/21/20 08:18  03/21/20 08:18








                                 Intake & Output











 03/20/20 03/21/20 03/22/20





 06:59 06:59 06:59


 


Intake Total 655 910 


 


Output Total 1250 365 


 


Balance -595 545 


 


Weight 56.8 kg 59.4 kg 











General appearance: PRESENT: no acute distress, well-developed, well-nourished


Head exam: PRESENT: atraumatic, normocephalic


Respiratory exam: PRESENT: clear to auscultation say.  ABSENT: rales, rhonchi, 

wheezes


Cardiovascular exam: PRESENT: RRR.  ABSENT: diastolic murmur, rubs, systolic 

murmur


GI/Abdominal exam: PRESENT: normal bowel sounds, soft.  ABSENT: distended, guard

ing, mass, organolmegaly, rebound, tenderness


Neurological exam: PRESENT: alert, awake, oriented to person, oriented to place,

CN II-XII grossly intact.  ABSENT: motor sensory deficit





Results


Laboratory Results: 


                                        





                                 03/20/20 04:25 





                                 03/21/20 04:51 





                                        











  03/21/20





  04:51


 


Sodium  129.1 L


 


Potassium  3.6


 


Chloride  99


 


Carbon Dioxide  28


 


Anion Gap  2 L


 


BUN  16


 


Creatinine  0.43 L


 


Est GFR (African Amer)  > 60


 


Glucose  87


 


Calcium  7.1 L


 


Magnesium  2.0








                                        











  03/04/20 03/04/20 03/05/20





  21:51 21:51 00:20


 


Creatine Kinase  128  


 


Troponin I   0.057  0.152


 


NT-Pro-B Natriuret Pep   5170 H 














  03/07/20





  13:37


 


Creatine Kinase 


 


Troponin I 


 


NT-Pro-B Natriuret Pep  5250 H











Impressions: 


                                        





Modified Barium Swallow  03/12/20 00:00


IMPRESSION:  TRACE LARYNGEAL PENETRATION WITHOUT ASPIRATION. PLEASE SEE SPEECH 

PATHOLOGIST REPORT FOR OTHER FINDINGS AND RECOMMENDATIONS.


 














Assessment and Plan





- Diagnosis


(1) Hyponatremia


Is this a current diagnosis for this admission?: Yes   


Plan: 


Likely SIADH.





Improving.  Not optimized.  





Has received 1 doses of tolvaptan.  





Euvolemic hyponatremia.





Denies any vomiting or diarrhea.





Was on scheduled antipsychotics which has been DC'd for the last 3 days.





Lasix has been on hold. Has been on fluid restriction of 2 L per 24 hours for 

the last 3 days.





Urine sodium 5. Urine osmolarity 768. Urine creatinine 65.4.





Not on SSRIs, PPIs or antiseizure meds.





Hold diuretics.  Nephrology on board.  Recommendations noted.  





Monitor for seizure.  Switch to regular diet briefly.





We will give another dose of tolvaptan.  If sodium optimized will discharge to 

SNF tomorrow. 








(2) Acute respiratory failure with hypoxia


Is this a current diagnosis for this admission?: Yes   


Plan: 


Moderate improvement.  SPO2 WNL on 2 L nasal cannula.  


Afebrile.  No leukocytosis.


This was most likely due to likely due to underlying upper respiratory function,

atypical pneumonia, COPD and CHF exacerbation.





Received 5 days of IV steroids.  


Received 6 days of empiric IV antibiotics.





Continue pulmonary toileting, duo nebs, BiPAP, LABA, ICS, LABA, incentive 

spirometry, flutter valve.








(3) Atrial fibrillation with RVR


Is this a current diagnosis for this admission?: Yes   


Plan: 


Rate controlled.


History of chronic persistent atrial fibrillation.


Was worsening likely due to respiratory failure and hypoxia


Initially was started on IV diltiazem drip and transition to p.o.


Continue diltiazem, digoxin, beta-blockers.  Monitor dig level.


Outpatient PCP and cardiology follow-up.








(4) Atypical pneumonia


Is this a current diagnosis for this admission?: Yes   


Plan: 


SPO2 WNL on 2 L. Afebrile.  WBC WNL.


Sputum culture on admission positive for Streptococcus species.


Likely bacterial infection followed viral illness


Received complete dose of ceftriaxone/doxycycline (azithromycin cannot be used 

due to very long QTC on admission)


Given the fact that patient was not improving and was still hypoxic with 

significant neutrophilic leukocytosis patient was started on broad-spectrum 

empiric IV antibiotics for healthcare associated pneumonia.  


Restarted on empiric IV antibiotics and received 6 days of IV antibiotics. 

Received 3 days of IV vancomycin. Vancomycin was DC'd 3/17/2020.








(5) CHF exacerbation


Qualifiers: 


   Heart failure type: systolic   Qualified Code(s): I50.23 - Acute on chronic 

systolic (congestive) heart failure   


Is this a current diagnosis for this admission?: Yes   


Plan: 


Compensated.


Presented with acute systolic heart failure


BNP elevated to > 5000 on admission.


Likely due to hospital-acquired pneumonia complicated by underlying A. fib RVR. 




3/12/2020 2D echo LVEF 50 to 55%.  Mild diastolic dysfunction.  RSVP 60 mmHg.





Cardiac diet, strict in and out, fluid restriction, diuretics, ACE, beta-

blockers, digoxin.








(6) COPD exacerbation


Is this a current diagnosis for this admission?: Yes   


Plan: 


Moderate improvement.  SPO2 WNL on RA.


History of non-oxygen dependent COPD.


Former smoker.


Plan as per #1.








(7) Crohn's disease


Is this a current diagnosis for this admission?: No   


Plan: 


Does not appear to be acutely exacerbated.


H&H stable.  Denies any melena or hematochezia.


Takes Pentasa at home


Resume home meds.


Outpatient PCP and gastroenterology follow-up.








(8) Dysphagia


Is this a current diagnosis for this admission?: Yes   


Plan: 


Status post modified barium swallow study.


Mechanical soft diet recommended.








(9) HTN (hypertension)


Is this a current diagnosis for this admission?: Yes   


Plan: 


Euvolemic.  Normotensive.  





Home medications are: captopril 50 mg p.o. twice daily, diltiazem 120 mg p.o. 

daily, Lasix 20 mg p.o. twice daily, metoprolol 25 mg p.o. daily.





Switch captopril to lisinopril for ease of administration.


Continue diltiazem, lisinopril, metoprolol.  PRN hydralazine and metoprolol.


Hold Lasix due to persistent hyponatremia. 


Adjust meds as needed.  Outpatient PCP follow-up.








(10) Influenza A


Is this a current diagnosis for this admission?: Yes   


Plan: 


Patient tested positive for Haemophilus influenza type b on admission.


Unfortunately was not started on Tamiflu until 3/10/2020.


Completed a course of Tamiflu.








- Plan Summary


Summary: 


3/6/2020


Temperature 97.9 pulse 76 blood pressure 144/58 O2 sat 94% on 4 L


We will have nursing try to wean him back down to 3 L


Patient has no history of thyroid disease will recheck free T3 and free T4


Medications include Lasix 10 mg IV every 12 hours Solu-Medrol 40 mg IV every 12 

hours Vibramycin 100 mg every 12 hours and Rocephin 2 g daily


Plus other incidental medications


Patient states he is breathing slightly better than on admission.  Patient lives

at home with his daughter and son-in-law


WBCs on admission were 18,000 today 13,000


Chemistry is grossly normal


1 blood culture grew out Streptococcus species


Will consult discharge planning to assess patient's needs at time of discharge





3/7/2020


Patient is afebrile 97 7 pulse is still between 90 and 100 blood pressure is 

elevated 186/57, although earlier this morning it was 144/58


I am also going to increase his Apresoline to 20 mg as needed


Patient is on captopril 50 mg every 12 hours, Cardizem 60 mg every 8 hours, 

Lasix was increased to 20 mg IV every 12 hours, Solu-Medrol was increased to 40 

mg every 8 hours.


Patient was also on Toprol XL 25 mg daily prior to admission and I will resume 

this


Patient's oxygen saturation is between 91 and 97% on 2 L nasal cannula





Patient is on IV Vibramycin as well as Rocephin, sensitivities appear to be 

appropriate for these 2 drugs


Patient is making slow progress


Patient lives at home with family.  Patient states he was not on oxygen prior to

coming in


Chest x-ray does showed pneumonia and patient also was flu A+





3/8/2020


Patient is more agitated when he is awake although you can speak to him and he 

will calm down.  Patient pulled out his Ball catheter yesterday and caused a 

little bit of trauma resulting in hematuria which has cleared on its own.


Patient does much better when on the BiPAP but this does require Ativan


Overall blood pressures are starting to come down


ABGs done early this morning are actually fairly normal


White count on admission was 18,000 and is now now down to 7.8


Lactic acid remains normal





Had a long conversation yesterday with the patient's daughter, Danita, I 

explained to her how sick her father was and that this could be a life-

threatening illness.  Patient told me yesterday that "he would see me in 

[facility]".  


I think we should continue on the same course of treatment since he appears to 

be gradually improving


Portable chest x-ray done yesterday is actually improved from admission





3/9/2020


This morning his temp is 97 6 pulse is anywhere from  blood pressure 

148/57 respiratory respiratory rates 25-37 on BiPAP he is between 90 and 98% 

saturation


Patient had a blood gas yesterday which was not all that terribly abnormal


Chemistry panel appears stable


1 blood culture cultures appears to be growing out a strep mitis , patient 

currently on IV doxycycline and IV Rocephin





Patient is requiring IV Haldol to keep him in the bed.  This morning I talked to

respiratory therapy about trying to switch him over to nasal cannula as he 

appears to be more anxious and agitated with the mask on than without it.  We 

will see how that goes





Patient's mental baseline was probably of some mild dementia certainly not is 

confused and altered as he is now.  I think patient's pulmonary status prior to 

admission was also marginal.





Patient's chest x-ray from 2 days ago really still shows just a cardiomegaly 

which is impressive but no acute pulmonary problems.


I will consult cardiology today to see if they have any insight

## 2020-03-22 LAB
ANION GAP SERPL CALC-SCNC: 6 MMOL/L (ref 5–19)
BUN SERPL-MCNC: 12 MG/DL (ref 7–20)
CALCIUM: 7.4 MG/DL (ref 8.4–10.2)
CHLORIDE SERPL-SCNC: 100 MMOL/L (ref 98–107)
CO2 SERPL-SCNC: 27 MMOL/L (ref 22–30)
GLUCOSE SERPL-MCNC: 142 MG/DL (ref 75–110)
POTASSIUM SERPL-SCNC: 3.6 MMOL/L (ref 3.6–5)

## 2020-03-22 RX ADMIN — NYSTATIN SCH: 100000 SUSPENSION ORAL at 13:09

## 2020-03-22 RX ADMIN — TAMSULOSIN HYDROCHLORIDE SCH MG: 0.4 CAPSULE ORAL at 21:11

## 2020-03-22 RX ADMIN — FERROUS SULFATE TAB 325 MG (65 MG ELEMENTAL FE) SCH: 325 (65 FE) TAB at 13:09

## 2020-03-22 RX ADMIN — METOPROLOL SUCCINATE SCH MG: 25 TABLET, EXTENDED RELEASE ORAL at 10:41

## 2020-03-22 RX ADMIN — LEVALBUTEROL HYDROCHLORIDE SCH MG: 0.63 SOLUTION RESPIRATORY (INHALATION) at 19:27

## 2020-03-22 RX ADMIN — LISINOPRIL SCH MG: 10 TABLET ORAL at 10:41

## 2020-03-22 RX ADMIN — FLUTICASONE FUROATE, UMECLIDINIUM BROMIDE AND VILANTEROL TRIFENATATE SCH INH: 100; 62.5; 25 POWDER RESPIRATORY (INHALATION) at 10:41

## 2020-03-22 RX ADMIN — IPRATROPIUM BROMIDE SCH MG: 0.5 SOLUTION RESPIRATORY (INHALATION) at 19:27

## 2020-03-22 RX ADMIN — LEVALBUTEROL HYDROCHLORIDE SCH MG: 0.63 SOLUTION RESPIRATORY (INHALATION) at 14:00

## 2020-03-22 RX ADMIN — NYSTATIN SCH UNIT: 100000 SUSPENSION ORAL at 05:00

## 2020-03-22 RX ADMIN — Medication SCH MG: at 21:11

## 2020-03-22 RX ADMIN — ENOXAPARIN SODIUM SCH MG: 40 INJECTION SUBCUTANEOUS at 10:41

## 2020-03-22 RX ADMIN — MESALAMINE SCH MG: 250 CAPSULE ORAL at 21:11

## 2020-03-22 RX ADMIN — DUTASTERIDE SCH MG: 0.5 CAPSULE, LIQUID FILLED ORAL at 08:08

## 2020-03-22 RX ADMIN — FAMOTIDINE SCH MG: 20 TABLET, FILM COATED ORAL at 10:41

## 2020-03-22 RX ADMIN — IPRATROPIUM BROMIDE SCH MG: 0.5 SOLUTION RESPIRATORY (INHALATION) at 14:00

## 2020-03-22 RX ADMIN — FAMOTIDINE SCH MG: 20 TABLET, FILM COATED ORAL at 21:11

## 2020-03-22 RX ADMIN — MESALAMINE SCH MG: 250 CAPSULE ORAL at 18:39

## 2020-03-22 RX ADMIN — IPRATROPIUM BROMIDE SCH MG: 0.5 SOLUTION RESPIRATORY (INHALATION) at 09:04

## 2020-03-22 RX ADMIN — POTASSIUM CHLORIDE SCH MEQ: 750 TABLET, FILM COATED, EXTENDED RELEASE ORAL at 08:08

## 2020-03-22 RX ADMIN — NYSTATIN SCH: 100000 SUSPENSION ORAL at 17:54

## 2020-03-22 RX ADMIN — ATORVASTATIN CALCIUM SCH MG: 40 TABLET, FILM COATED ORAL at 21:11

## 2020-03-22 RX ADMIN — Medication SCH MG: at 10:41

## 2020-03-22 RX ADMIN — DIGOXIN SCH: 0.12 TABLET ORAL at 17:53

## 2020-03-22 RX ADMIN — OXYCODONE HYDROCHLORIDE AND ACETAMINOPHEN SCH MG: 500 TABLET ORAL at 08:08

## 2020-03-22 RX ADMIN — METOPROLOL SUCCINATE SCH MG: 25 TABLET, EXTENDED RELEASE ORAL at 21:11

## 2020-03-22 RX ADMIN — ACETAMINOPHEN PRN MG: 325 TABLET ORAL at 20:06

## 2020-03-22 RX ADMIN — LEVALBUTEROL HYDROCHLORIDE SCH MG: 0.63 SOLUTION RESPIRATORY (INHALATION) at 09:04

## 2020-03-22 RX ADMIN — DILTIAZEM HYDROCHLORIDE SCH MG: 120 CAPSULE, COATED, EXTENDED RELEASE ORAL at 08:08

## 2020-03-22 RX ADMIN — DIGOXIN SCH MG: 0.12 TABLET ORAL at 18:38

## 2020-03-22 RX ADMIN — MESALAMINE SCH: 250 CAPSULE ORAL at 17:54

## 2020-03-22 RX ADMIN — MESALAMINE SCH: 250 CAPSULE ORAL at 15:55

## 2020-03-22 RX ADMIN — MESALAMINE SCH MG: 250 CAPSULE ORAL at 10:40

## 2020-03-22 NOTE — PDOC PROGRESS REPORT
Subjective


Progress Note for:: 03/22/20


Subjective:: 


LUIS F VELA is a 86 year old male with past medical history significant for 

chronic A. fib, HTN, HLD, COPD, Crohn's disease who presented to ED with severe 

respiratory distress that awoke him from sleep.  Patient contracted a viral 

upper respiratory illness proximately 3 days prior to admission and this became 

worse gradually causing productive cough shortness of breath/ROBERTSON.  Patient does 

not use home oxygen prior to admission.  He was requiring BiPAP to maintain 

saturations on admission.  Given steroids, nebulizers, antibiotics on admission.

 Chest x-ray did not specifically show pneumonia the lungs had scattered 

congestion on exam.  Patient noted to be in A. fib with RVR on admission with 

rates in the 150s and is promptly came down with diltiazem drip with a rate of 

10mg/h.  Daughter is in charge of patient's medications and states he has not 

missed any doses including his digoxin and diltiazem. 





3/14/2020.  No acute events overnight, patient complaining of congestion, dry 

mouth, and being very anxious, otherwise denies any fever, chills, nausea, vo

miting, diarrhea, constipation or any urinary symptoms.





3/15/2020.  No acute events overnight.  Patient comfortably resting in bed no 

apparent distress, denies being anxious, denies any fever, chills, nausea, 

vomiting, diarrhea, constipation or any urinary symptoms.  Alert and oriented 

x2.  P.o. tolerant.





3/16/2020.  No acute events overnight.  Significant improvement of respiratory 

symptoms, in no apparent distress, denies any fever, chills, nausea, vomiting, 

diarrhea, constipation or any urinary symptoms.  Pending placement.





3/17/2020.  No acute events overnight.  Patient currently resting in bed no 

apparent distress.  Denies any fever, chills, nausea, vomiting, diarrhea, 

constipation or any urinary symptoms.  Patient could potentially be discharged 

home as daughter does not want him to be placed in SNF at this point because of 

the fact that she will not be able to visit him at the SNF.  Patient is in 

agreement.  Unfortunately patient's sodium trending down and nephrology has been

consulted pending recommendations.





3/18/2020.  No acute events overnight.  Comfortably resting in bed no apparent 

distress, denies any fever, chills, nausea, vomiting, diarrhea, constipation or 

any urinary symptoms.  Serum sodium improving however still not within normal 

limits.





3/19/2020.  No acute events overnight.  Patient currently resting in bed no 

apparent distress, denies any fever, chills, nausea, vomiting, diarrhea, 

constipation or any urinary symptoms.  Unfortunately patient's sodium is still 

130 and would benefit from staying another day.





3/20/2020.  No acute events overnight.  Unfortunately patient sodium is still 

not optimized, family would like for patient to be transferred to SNF, patient 

currently resting in bed no apparent distress, denies any fever, chills, nausea,

vomiting, diarrhea, constipation or any urinary symptoms.





3/21/2020.  No acute events overnight.  Patient comfortably sitting in bed no 

apparent distress.  Denies any fever, chills, nausea, vomiting, diarrhea, 

constipation or any urinary symptoms.  Unfortunately sodium is still not 

optimized.





3/22/2020.  No acute events overnight.  Comfortably sitting in apparent 

distress, communicative and interactive, denies any fever, chills, nausea, 

vomiting, diarrhea, constipation.  Could potentially be discharged to SNF but 

unfortunately he is pending placement.  His sodium is 133 today.


Reason For Visit: 


CAP,COPD EXACERBATION,ACUTE ON CHRONIC AFIB WITH








Physical Exam


Vital Signs: 


                                        











Temp Pulse Resp BP Pulse Ox


 


 98.5 F   76   18   143/49 H  96 


 


 03/22/20 06:46  03/22/20 07:00  03/22/20 06:46  03/22/20 06:46  03/22/20 06:46








                                 Intake & Output











 03/21/20 03/22/20 03/23/20





 06:59 06:59 06:59


 


Intake Total 910 800 


 


Output Total 365 1450 


 


Balance 545 -650 


 


Weight 59.4 kg 62.9 kg 











General appearance: PRESENT: no acute distress, well-developed, well-nourished


Head exam: PRESENT: atraumatic, normocephalic


Respiratory exam: PRESENT: clear to auscultation say.  ABSENT: rales, rhonchi, 

wheezes


Cardiovascular exam: PRESENT: RRR.  ABSENT: diastolic murmur, rubs, systolic 

murmur


Pulses: PRESENT: normal dorsalis pedis pul


GI/Abdominal exam: PRESENT: normal bowel sounds, soft.  ABSENT: distended, 

guarding, mass, organolmegaly, rebound, tenderness


Neurological exam: PRESENT: alert, awake, oriented to person, CN II-XII grossly 

intact.  ABSENT: motor sensory deficit





Results


Laboratory Results: 


                                        





                                 03/20/20 04:25 





                                 03/22/20 04:58 





                                        











  03/22/20





  04:58


 


Sodium  133.0 L


 


Potassium  3.6


 


Chloride  100


 


Carbon Dioxide  27


 


Anion Gap  6


 


BUN  12


 


Creatinine  0.42 L


 


Est GFR (African Amer)  > 60


 


Glucose  142 H


 


Calcium  7.4 L








                                        











  03/04/20 03/04/20 03/05/20





  21:51 21:51 00:20


 


Creatine Kinase  128  


 


Troponin I   0.057  0.152


 


NT-Pro-B Natriuret Pep   5170 H 














  03/07/20





  13:37


 


Creatine Kinase 


 


Troponin I 


 


NT-Pro-B Natriuret Pep  5250 H











Impressions: 


                                        





Modified Barium Swallow  03/12/20 00:00


IMPRESSION:  TRACE LARYNGEAL PENETRATION WITHOUT ASPIRATION. PLEASE SEE SPEECH 

PATHOLOGIST REPORT FOR OTHER FINDINGS AND RECOMMENDATIONS.


 














Assessment and Plan





- Diagnosis


(1) Hyponatremia


Is this a current diagnosis for this admission?: Yes   


Plan: 


Likely SIADH.





Improving.  Sodium is 133 today.  





Has received 2 doses of tolvaptan.  





Euvolemic hyponatremia.





Denies any vomiting or diarrhea.





Was on scheduled antipsychotics which has been DC'd for the last 3 days.





Lasix has been on hold. Has been on fluid restriction of 2 L per 24 hours for 

the last 3 days.





Urine sodium 5. Urine osmolarity 768. Urine creatinine 65.4.





Not on SSRIs, PPIs or antiseizure meds.





Hold diuretics.  Nephrology on board.  Recommendations noted.  





Monitor for seizure.  Switch to regular diet briefly.





Could potentially be discharged to SNF however patient is pending placement.








(2) Acute respiratory failure with hypoxia


Is this a current diagnosis for this admission?: Yes   


Plan: 


Moderate improvement.  SPO2 WNL on 2 L nasal cannula.  


Afebrile.  No leukocytosis.


This was most likely due to likely due to underlying upper respiratory function,

atypical pneumonia, COPD and CHF exacerbation.





Received 5 days of IV steroids.  


Received 6 days of empiric IV antibiotics.





Continue pulmonary toileting, duo nebs, BiPAP, LABA, ICS, LABA, incentive 

spirometry, flutter valve.








(3) Atrial fibrillation with RVR


Is this a current diagnosis for this admission?: Yes   


Plan: 


Rate controlled.


History of chronic persistent atrial fibrillation.


Was worsening likely due to respiratory failure and hypoxia


Initially was started on IV diltiazem drip and transition to p.o.


Continue diltiazem, digoxin, beta-blockers.  Monitor dig level.


Outpatient PCP and cardiology follow-up.








(4) Atypical pneumonia


Is this a current diagnosis for this admission?: Yes   


Plan: 


SPO2 WNL on 2 L. Afebrile.  WBC WNL.


Sputum culture on admission positive for Streptococcus species.


Likely bacterial infection followed viral illness


Received complete dose of ceftriaxone/doxycycline (azithromycin cannot be used 

due to very long QTC on admission)


Given the fact that patient was not improving and was still hypoxic with 

significant neutrophilic leukocytosis patient was started on broad-spectrum 

empiric IV antibiotics for healthcare associated pneumonia.  


Restarted on empiric IV antibiotics and received 6 days of IV antibiotics. 

Received 3 days of IV vancomycin. Vancomycin was DC'd 3/17/2020.








(5) CHF exacerbation


Qualifiers: 


   Heart failure type: systolic   Qualified Code(s): I50.23 - Acute on chronic 

systolic (congestive) heart failure   


Is this a current diagnosis for this admission?: Yes   


Plan: 


Compensated.


Presented with acute systolic heart failure


BNP elevated to > 5000 on admission.


Likely due to hospital-acquired pneumonia complicated by underlying A. fib RVR. 




3/12/2020 2D echo LVEF 50 to 55%.  Mild diastolic dysfunction.  RSVP 60 mmHg.





Cardiac diet, strict in and out, fluid restriction, ACE, beta-blockers, digoxin.





Lasix has been held due to persistent hyponatremia.  Once hyponatremia has 

resolved restart Lasix.








(6) COPD exacerbation


Is this a current diagnosis for this admission?: Yes   


Plan: 


Moderate improvement.  SPO2 WNL on RA.


History of non-oxygen dependent COPD.


Former smoker.


Plan as per #1.








(7) Crohn's disease


Is this a current diagnosis for this admission?: No   


Plan: 


Does not appear to be acutely exacerbated.


H&H stable.  Denies any melena or hematochezia.


Takes Pentasa at home


Resume home meds.


Outpatient PCP and gastroenterology follow-up.








(8) Dysphagia


Is this a current diagnosis for this admission?: Yes   


Plan: 


Status post modified barium swallow study.


Mechanical soft diet recommended.








(9) HTN (hypertension)


Is this a current diagnosis for this admission?: Yes   


Plan: 


Euvolemic.  Normotensive.  





Home medications are: captopril 50 mg p.o. twice daily, diltiazem 120 mg p.o. 

daily, Lasix 20 mg p.o. twice daily, metoprolol 25 mg p.o. daily.





Switch captopril to lisinopril for ease of administration.


Continue diltiazem, lisinopril, metoprolol.  PRN hydralazine and metoprolol.


Hold Lasix due to persistent hyponatremia. 


Adjust meds as needed.  Outpatient PCP follow-up.








(10) Influenza A


Is this a current diagnosis for this admission?: Yes   


Plan: 


Patient tested positive for Haemophilus influenza type b on admission.


Unfortunately was not started on Tamiflu until 3/10/2020.


Completed a course of Tamiflu.








- Plan Summary


Summary: 


3/6/2020


Temperature 97.9 pulse 76 blood pressure 144/58 O2 sat 94% on 4 L


We will have nursing try to wean him back down to 3 L


Patient has no history of thyroid disease will recheck free T3 and free T4


Medications include Lasix 10 mg IV every 12 hours Solu-Medrol 40 mg IV every 12 

hours Vibramycin 100 mg every 12 hours and Rocephin 2 g daily


Plus other incidental medications


Patient states he is breathing slightly better than on admission.  Patient lives

at home with his daughter and son-in-law


WBCs on admission were 18,000 today 13,000


Chemistry is grossly normal


1 blood culture grew out Streptococcus species


Will consult discharge planning to assess patient's needs at time of discharge





3/7/2020


Patient is afebrile 97 7 pulse is still between 90 and 100 blood pressure is 

elevated 186/57, although earlier this morning it was 144/58


I am also going to increase his Apresoline to 20 mg as needed


Patient is on captopril 50 mg every 12 hours, Cardizem 60 mg every 8 hours, 

Lasix was increased to 20 mg IV every 12 hours, Solu-Medrol was increased to 40 

mg every 8 hours.


Patient was also on Toprol XL 25 mg daily prior to admission and I will resume 

this


Patient's oxygen saturation is between 91 and 97% on 2 L nasal cannula





Patient is on IV Vibramycin as well as Rocephin, sensitivities appear to be 

appropriate for these 2 drugs


Patient is making slow progress


Patient lives at home with family.  Patient states he was not on oxygen prior to

coming in


Chest x-ray does showed pneumonia and patient also was flu A+





3/8/2020


Patient is more agitated when he is awake although you can speak to him and he 

will calm down.  Patient pulled out his Ball catheter yesterday and caused a 

little bit of trauma resulting in hematuria which has cleared on its own.


Patient does much better when on the BiPAP but this does require Ativan


Overall blood pressures are starting to come down


ABGs done early this morning are actually fairly normal


White count on admission was 18,000 and is now now down to 7.8


Lactic acid remains normal





Had a long conversation yesterday with the patient's daughter, Danita, I 

explained to her how sick her father was and that this could be a life-

threatening illness.  Patient told me yesterday that "he would see me in 

[facility]".  


I think we should continue on the same course of treatment since he appears to 

be gradually improving


Portable chest x-ray done yesterday is actually improved from admission





3/9/2020


This morning his temp is 97 6 pulse is anywhere from  blood pressure 

148/57 respiratory respiratory rates 25-37 on BiPAP he is between 90 and 98% 

saturation


Patient had a blood gas yesterday which was not all that terribly abnormal


Chemistry panel appears stable


1 blood culture cultures appears to be growing out a strep mitis , patient 

currently on IV doxycycline and IV Rocephin





Patient is requiring IV Haldol to keep him in the bed.  This morning I talked to

respiratory therapy about trying to switch him over to nasal cannula as he 

appears to be more anxious and agitated with the mask on than without it.  We 

will see how that goes





Patient's mental baseline was probably of some mild dementia certainly not is 

confused and altered as he is now.  I think patient's pulmonary status prior to 

admission was also marginal.





Patient's chest x-ray from 2 days ago really still shows just a cardiomegaly 

which is impressive but no acute pulmonary problems.


I will consult cardiology today to see if they have any insight

## 2020-03-23 LAB
ANION GAP SERPL CALC-SCNC: 3 MMOL/L (ref 5–19)
BUN SERPL-MCNC: 12 MG/DL (ref 7–20)
CALCIUM: 7.1 MG/DL (ref 8.4–10.2)
CHLORIDE SERPL-SCNC: 98 MMOL/L (ref 98–107)
CO2 SERPL-SCNC: 30 MMOL/L (ref 22–30)
GLUCOSE SERPL-MCNC: 88 MG/DL (ref 75–110)
POTASSIUM SERPL-SCNC: 4.3 MMOL/L (ref 3.6–5)

## 2020-03-23 RX ADMIN — NYSTATIN SCH UNIT: 100000 SUSPENSION ORAL at 14:51

## 2020-03-23 RX ADMIN — LISINOPRIL SCH MG: 10 TABLET ORAL at 09:25

## 2020-03-23 RX ADMIN — DIGOXIN SCH MG: 0.12 TABLET ORAL at 17:47

## 2020-03-23 RX ADMIN — DUTASTERIDE SCH MG: 0.5 CAPSULE, LIQUID FILLED ORAL at 09:24

## 2020-03-23 RX ADMIN — IPRATROPIUM BROMIDE SCH MG: 0.5 SOLUTION RESPIRATORY (INHALATION) at 08:24

## 2020-03-23 RX ADMIN — ATORVASTATIN CALCIUM SCH MG: 40 TABLET, FILM COATED ORAL at 21:25

## 2020-03-23 RX ADMIN — MESALAMINE SCH MG: 250 CAPSULE ORAL at 14:51

## 2020-03-23 RX ADMIN — IPRATROPIUM BROMIDE SCH MG: 0.5 SOLUTION RESPIRATORY (INHALATION) at 20:12

## 2020-03-23 RX ADMIN — LEVALBUTEROL HYDROCHLORIDE SCH MG: 0.63 SOLUTION RESPIRATORY (INHALATION) at 08:24

## 2020-03-23 RX ADMIN — FAMOTIDINE SCH MG: 20 TABLET, FILM COATED ORAL at 21:25

## 2020-03-23 RX ADMIN — TAMSULOSIN HYDROCHLORIDE SCH MG: 0.4 CAPSULE ORAL at 21:25

## 2020-03-23 RX ADMIN — OXYCODONE HYDROCHLORIDE AND ACETAMINOPHEN SCH MG: 500 TABLET ORAL at 09:25

## 2020-03-23 RX ADMIN — MESALAMINE SCH MG: 250 CAPSULE ORAL at 17:47

## 2020-03-23 RX ADMIN — LEVALBUTEROL HYDROCHLORIDE SCH MG: 0.63 SOLUTION RESPIRATORY (INHALATION) at 20:13

## 2020-03-23 RX ADMIN — FERROUS SULFATE TAB 325 MG (65 MG ELEMENTAL FE) SCH MG: 325 (65 FE) TAB at 14:51

## 2020-03-23 RX ADMIN — MESALAMINE SCH MG: 250 CAPSULE ORAL at 21:27

## 2020-03-23 RX ADMIN — DILTIAZEM HYDROCHLORIDE SCH MG: 120 CAPSULE, COATED, EXTENDED RELEASE ORAL at 09:25

## 2020-03-23 RX ADMIN — NYSTATIN SCH UNIT: 100000 SUSPENSION ORAL at 06:08

## 2020-03-23 RX ADMIN — Medication SCH MG: at 09:25

## 2020-03-23 RX ADMIN — METOPROLOL SUCCINATE SCH MG: 25 TABLET, EXTENDED RELEASE ORAL at 09:25

## 2020-03-23 RX ADMIN — NYSTATIN SCH UNIT: 100000 SUSPENSION ORAL at 00:18

## 2020-03-23 RX ADMIN — IPRATROPIUM BROMIDE SCH MG: 0.5 SOLUTION RESPIRATORY (INHALATION) at 14:12

## 2020-03-23 RX ADMIN — FAMOTIDINE SCH MG: 20 TABLET, FILM COATED ORAL at 09:26

## 2020-03-23 RX ADMIN — LEVALBUTEROL HYDROCHLORIDE SCH MG: 0.63 SOLUTION RESPIRATORY (INHALATION) at 14:12

## 2020-03-23 RX ADMIN — FLUTICASONE FUROATE, UMECLIDINIUM BROMIDE AND VILANTEROL TRIFENATATE SCH INH: 100; 62.5; 25 POWDER RESPIRATORY (INHALATION) at 09:24

## 2020-03-23 RX ADMIN — METOPROLOL SUCCINATE SCH MG: 25 TABLET, EXTENDED RELEASE ORAL at 21:25

## 2020-03-23 RX ADMIN — MESALAMINE SCH MG: 250 CAPSULE ORAL at 09:24

## 2020-03-23 RX ADMIN — POTASSIUM CHLORIDE SCH MEQ: 750 TABLET, FILM COATED, EXTENDED RELEASE ORAL at 09:25

## 2020-03-23 RX ADMIN — ENOXAPARIN SODIUM SCH MG: 40 INJECTION SUBCUTANEOUS at 09:26

## 2020-03-23 RX ADMIN — NYSTATIN SCH UNIT: 100000 SUSPENSION ORAL at 17:48

## 2020-03-23 NOTE — PDOC PROGRESS REPORT
Subjective


Progress Note for:: 03/23/20


Subjective:: 





Patient is doing well today.  Has no complaints at this moment.


Reason For Visit: 


CAP,COPD EXACERBATION,ACUTE ON CHRONIC AFIB WITH








Physical Exam


Vital Signs: 


                                        











Temp Pulse Resp BP Pulse Ox


 


 99.2 F   70   20   124/46 L  95 


 


 03/23/20 11:24  03/23/20 11:24  03/23/20 11:24  03/23/20 11:24  03/23/20 11:24








                                 Intake & Output











 03/22/20 03/23/20 03/24/20





 06:59 06:59 06:59


 


Intake Total 800 350 240


 


Output Total 1450 875 275


 


Balance -650 -525 -35


 


Weight 62.9 kg 62.1 kg 











General appearance: PRESENT: no acute distress, cooperative


Neck exam: ABSENT: JVD


Respiratory exam: PRESENT: clear to auscultation say


Cardiovascular exam: PRESENT: +S1, +S2


Neurological exam: PRESENT: alert, awake





Results


Laboratory Results: 


                                        





                                 03/20/20 04:25 





                                 03/23/20 04:01 





                                        











  03/23/20





  04:01


 


Sodium  131.4 L


 


Potassium  4.3


 


Chloride  98


 


Carbon Dioxide  30


 


Anion Gap  3 L


 


BUN  12


 


Creatinine  0.39 L


 


Est GFR (African Amer)  > 60


 


Glucose  88


 


Calcium  7.1 L








                                        











  03/04/20 03/04/20 03/05/20





  21:51 21:51 00:20


 


Creatine Kinase  128  


 


Troponin I   0.057  0.152


 


NT-Pro-B Natriuret Pep   5170 H 














  03/07/20





  13:37


 


Creatine Kinase 


 


Troponin I 


 


NT-Pro-B Natriuret Pep  5250 H











Impressions: 


                                        





Modified Barium Swallow  03/12/20 00:00


IMPRESSION:  TRACE LARYNGEAL PENETRATION WITHOUT ASPIRATION. PLEASE SEE SPEECH 

PATHOLOGIST REPORT FOR OTHER FINDINGS AND RECOMMENDATIONS.


 














Assessment and Plan





- Diagnosis


(1) Hyponatremia


Is this a current diagnosis for this admission?: Yes   


Plan: 


Likely SIADH.


Improving.  Sodium is 131 today.  


Has received 2 doses of tolvaptan.  


Euvolemic hyponatremia.


Was on scheduled antipsychotics which has been DC'd for the last few days


Lasix has been on hold. Has been on fluid restriction of 2 L per 24 hours for 

the last 3 days.


Urine sodium 5. Urine osmolarity 768. Urine creatinine 65.4.


Hold diuretics.  Nephrology on board.  Recommendations noted.  


Sodium chloride tablets











(2) Acute respiratory failure with hypoxia


Is this a current diagnosis for this admission?: Yes   


Plan: 


Likely secondary to atypical pneumonia from influenza a and underlying COPD.  

Will attempt to wean off oxygen today.








(3) Atrial fibrillation with RVR


Is this a current diagnosis for this admission?: Yes   


Plan: 


Rate controlled. chronic persistent atrial fibrillation.


Continue diltiazem, digoxin, beta-blockers.  Monitor dig level.


Outpatient PCP and cardiology follow-up.








(4) Atypical pneumonia


Is this a current diagnosis for this admission?: Yes   


Plan: 


SPO2 WNL on 2 L. Afebrile.  WBC WNL.


Sputum culture on admission positive for Streptococcus species.


Likely bacterial infection followed viral illness


Received complete dose of ceftriaxone/doxycycline (azithromycin cannot be used 

due to very long QTC on admission)


received 6 days of IV antibiotics. Received 3 days of IV vancomycin. Vancomycin 

was DC'd 3/17/2020.








(5) COPD exacerbation


Is this a current diagnosis for this admission?: Yes   


Plan: 


Acute exacerbation has resolved.  Nebs as needed.








(6) Dysphagia


Is this a current diagnosis for this admission?: Yes   


Plan: 


Status post modified barium swallow study.


Mechanical soft diet recommended.








(7) HTN (hypertension)


Is this a current diagnosis for this admission?: Yes   


Plan: 


Euvolemic.  Normotensive.  





Home medications are: captopril 50 mg p.o. twice daily, diltiazem 120 mg p.o. 

daily, Lasix 20 mg p.o. twice daily, metoprolol 25 mg p.o. daily.





Switched captopril to lisinopril for ease of administration.


Continue diltiazem, lisinopril, metoprolol.  PRN hydralazine and metoprolol.


Hold Lasix due to persistent hyponatremia. 


Adjust meds as needed.  Outpatient PCP follow-up.








(8) Influenza A


Is this a current diagnosis for this admission?: Yes   


Plan: 


Patient tested positive for Haemophilus influenza type b on admission.


Completed a course of Tamiflu.








- Time


Time Spent with patient: Less than 15 minutes

## 2020-03-24 LAB
ANION GAP SERPL CALC-SCNC: 4 MMOL/L (ref 5–19)
BUN SERPL-MCNC: 11 MG/DL (ref 7–20)
CALCIUM: 7.1 MG/DL (ref 8.4–10.2)
CHLORIDE SERPL-SCNC: 98 MMOL/L (ref 98–107)
CO2 SERPL-SCNC: 26 MMOL/L (ref 22–30)
GLUCOSE SERPL-MCNC: 84 MG/DL (ref 75–110)
OSMOLALITY,URINE: 759 MOSM/KG (ref 300–900)
POTASSIUM SERPL-SCNC: 4.5 MMOL/L (ref 3.6–5)
URINE SODIUM: 181 MMOL/L (ref 30–90)

## 2020-03-24 RX ADMIN — MESALAMINE SCH MG: 250 CAPSULE ORAL at 22:00

## 2020-03-24 RX ADMIN — FUROSEMIDE SCH MG: 40 TABLET ORAL at 17:09

## 2020-03-24 RX ADMIN — FLUTICASONE FUROATE, UMECLIDINIUM BROMIDE AND VILANTEROL TRIFENATATE SCH INH: 100; 62.5; 25 POWDER RESPIRATORY (INHALATION) at 09:46

## 2020-03-24 RX ADMIN — FAMOTIDINE SCH MG: 20 TABLET, FILM COATED ORAL at 22:00

## 2020-03-24 RX ADMIN — MESALAMINE SCH MG: 250 CAPSULE ORAL at 17:09

## 2020-03-24 RX ADMIN — POTASSIUM CHLORIDE SCH MEQ: 750 TABLET, FILM COATED, EXTENDED RELEASE ORAL at 09:48

## 2020-03-24 RX ADMIN — LEVALBUTEROL HYDROCHLORIDE SCH MG: 0.63 SOLUTION RESPIRATORY (INHALATION) at 07:58

## 2020-03-24 RX ADMIN — LEVALBUTEROL HYDROCHLORIDE SCH MG: 0.63 SOLUTION RESPIRATORY (INHALATION) at 19:40

## 2020-03-24 RX ADMIN — OXYCODONE HYDROCHLORIDE AND ACETAMINOPHEN SCH MG: 500 TABLET ORAL at 09:48

## 2020-03-24 RX ADMIN — IPRATROPIUM BROMIDE SCH MG: 0.5 SOLUTION RESPIRATORY (INHALATION) at 13:23

## 2020-03-24 RX ADMIN — MESALAMINE SCH MG: 250 CAPSULE ORAL at 09:48

## 2020-03-24 RX ADMIN — METOPROLOL SUCCINATE SCH MG: 25 TABLET, EXTENDED RELEASE ORAL at 09:49

## 2020-03-24 RX ADMIN — IPRATROPIUM BROMIDE SCH MG: 0.5 SOLUTION RESPIRATORY (INHALATION) at 19:40

## 2020-03-24 RX ADMIN — FERROUS SULFATE TAB 325 MG (65 MG ELEMENTAL FE) SCH: 325 (65 FE) TAB at 11:35

## 2020-03-24 RX ADMIN — DIGOXIN SCH MG: 0.12 TABLET ORAL at 17:09

## 2020-03-24 RX ADMIN — MESALAMINE SCH MG: 250 CAPSULE ORAL at 13:18

## 2020-03-24 RX ADMIN — TAMSULOSIN HYDROCHLORIDE SCH MG: 0.4 CAPSULE ORAL at 22:01

## 2020-03-24 RX ADMIN — SODIUM CHLORIDE TAB 1 GM SCH GM: 1 TAB at 09:47

## 2020-03-24 RX ADMIN — LISINOPRIL SCH MG: 10 TABLET ORAL at 09:47

## 2020-03-24 RX ADMIN — FAMOTIDINE SCH MG: 20 TABLET, FILM COATED ORAL at 09:48

## 2020-03-24 RX ADMIN — SODIUM CHLORIDE TAB 1 GM SCH: 1 TAB at 13:16

## 2020-03-24 RX ADMIN — LEVALBUTEROL HYDROCHLORIDE SCH MG: 0.63 SOLUTION RESPIRATORY (INHALATION) at 13:23

## 2020-03-24 RX ADMIN — DILTIAZEM HYDROCHLORIDE SCH MG: 120 CAPSULE, COATED, EXTENDED RELEASE ORAL at 09:48

## 2020-03-24 RX ADMIN — DUTASTERIDE SCH MG: 0.5 CAPSULE, LIQUID FILLED ORAL at 09:47

## 2020-03-24 RX ADMIN — ATORVASTATIN CALCIUM SCH MG: 40 TABLET, FILM COATED ORAL at 22:00

## 2020-03-24 RX ADMIN — METOPROLOL SUCCINATE SCH MG: 25 TABLET, EXTENDED RELEASE ORAL at 22:00

## 2020-03-24 RX ADMIN — IPRATROPIUM BROMIDE SCH MG: 0.5 SOLUTION RESPIRATORY (INHALATION) at 07:58

## 2020-03-24 RX ADMIN — ENOXAPARIN SODIUM SCH MG: 40 INJECTION SUBCUTANEOUS at 09:46

## 2020-03-24 NOTE — PDOC PROGRESS REPORT
Subjective


Progress Note for:: 03/23/20


Subjective:: 


This note is for 3/23/20.





Patient was seen laying in his bed at the time. He claims to be feeling well. 

Currently consuming more than 1,000ml per a day. Claims to be feeling well. 

Denies chest pain  or SOB. Denies n/v/d. 


Reason For Visit: 


CAP,COPD EXACERBATION,ACUTE ON CHRONIC AFIB WITH








Physical Exam


Vital Signs: 


                                        











Temp Pulse Resp BP Pulse Ox


 


 98.8 F   77   18   131/43 H  95 


 


 03/23/20 16:59  03/24/20 07:58  03/24/20 07:58  03/23/20 16:59  03/24/20 07:58








                                 Intake & Output











 03/23/20 03/24/20 03/25/20





 06:59 06:59 06:59


 


Intake Total 350 1158 


 


Output Total 875 530 


 


Balance -525 628 


 


Weight 62.1 kg 62.1 kg 











General appearance: PRESENT: no acute distress, well-developed, well-nourished


Mouth exam: PRESENT: moist, neck supple


Respiratory exam: PRESENT: clear to auscultation say.  ABSENT: accessory muscle 

use, crackles, rales, rhonchi, wheezes


Cardiovascular exam: PRESENT: +S1, +S2


GI/Abdominal exam: PRESENT: soft.  ABSENT: tenderness


Extremities exam: ABSENT: tenderness, +1 edema, +2 edema


Neurological exam: PRESENT: alert, awake, oriented to person, oriented to place,

oriented to time, oriented to situation


Skin exam: PRESENT: dry, intact, warm





Results


Laboratory Results: 


                                        





                                 03/20/20 04:25 





                                 03/24/20 04:47 





                                        











  03/24/20





  04:47


 


Sodium  128.2 L


 


Potassium  4.5


 


Chloride  98


 


Carbon Dioxide  26


 


Anion Gap  4 L


 


BUN  11


 


Creatinine  0.38 L


 


Est GFR (African Amer)  > 60


 


Glucose  84


 


Calcium  7.1 L








                                        











  03/04/20 03/04/20 03/05/20





  21:51 21:51 00:20


 


Creatine Kinase  128  


 


Troponin I   0.057  0.152


 


NT-Pro-B Natriuret Pep   5170 H 














  03/07/20





  13:37


 


Creatine Kinase 


 


Troponin I 


 


NT-Pro-B Natriuret Pep  5250 H











Impressions: 


                                        





Modified Barium Swallow  03/12/20 00:00


IMPRESSION:  TRACE LARYNGEAL PENETRATION WITHOUT ASPIRATION. PLEASE SEE SPEECH 

PATHOLOGIST REPORT FOR OTHER FINDINGS AND RECOMMENDATIONS.


 














Assessment & Plan





- Diagnosis


(1) Hyponatremia


Is this a current diagnosis for this admission?: Yes   


Plan: 


Patient needs to be restricted to 1000mL a day. Will reassess sodium tomorrow 

(3/24/20). He should range no lower that the 130s. 








(2) Hypocalcemia


Plan: 


will get an updated albumin to get a corrected calcium before adding a calcium 

supplement.








(3) HTN (hypertension)


Is this a current diagnosis for this admission?: Yes   


Plan: 


controlled








(4) Atypical pneumonia


Is this a current diagnosis for this admission?: Yes   


Plan: 


resolved








(5) Influenza A


Is this a current diagnosis for this admission?: Yes   


Plan: 


resolved








- Notes


Notes: 


case was discussed with Dr. Garcias.

## 2020-03-24 NOTE — PDOC PROGRESS REPORT
Subjective


Progress Note for:: 03/24/20


Subjective:: 





Patient feels well.  A little confused this morning.  His sodium however 

continues to trend down.


Reason For Visit: 


CAP,COPD EXACERBATION,ACUTE ON CHRONIC AFIB WITH








Physical Exam


Vital Signs: 


                                        











Temp Pulse Resp BP Pulse Ox


 


 97.4 F   83   16   136/44 H  93 


 


 03/24/20 11:24  03/24/20 13:23  03/24/20 13:23  03/24/20 11:24  03/24/20 13:23








                                 Intake & Output











 03/23/20 03/24/20 03/25/20





 06:59 06:59 06:59


 


Intake Total 350 1158 


 


Output Total 875 530 


 


Balance -525 628 


 


Weight 62.1 kg 62.1 kg 62.1 kg











General appearance: PRESENT: no acute distress, cooperative


Neck exam: ABSENT: JVD


Respiratory exam: PRESENT: symmetrical, unlabored.  ABSENT: tachypnea, wheezes


Cardiovascular exam: PRESENT: RRR, +S1, +S2.  ABSENT: tachycardia


Neurological exam: PRESENT: alert, awake, oriented to person, oriented to place.

 ABSENT: oriented to time, oriented to situation


Focused psych exam: PRESENT: delusional.  ABSENT: pressured speech





Results


Laboratory Results: 


                                        





                                 03/20/20 04:25 





                                 03/24/20 04:47 





                                        











  03/24/20 03/24/20





  04:47 04:47


 


Sodium  128.2 L 


 


Potassium  4.5 


 


Chloride  98 


 


Carbon Dioxide  26 


 


Anion Gap  4 L 


 


BUN  11 


 


Creatinine  0.38 L 


 


Est GFR (African Amer)  > 60 


 


Glucose  84 


 


Calcium  7.1 L 


 


Albumin   1.9 L








                                        











  03/04/20 03/04/20 03/05/20





  21:51 21:51 00:20


 


Creatine Kinase  128  


 


Troponin I   0.057  0.152


 


NT-Pro-B Natriuret Pep   5170 H 














  03/07/20





  13:37


 


Creatine Kinase 


 


Troponin I 


 


NT-Pro-B Natriuret Pep  5250 H











Impressions: 


                                        





Modified Barium Swallow  03/12/20 00:00


IMPRESSION:  TRACE LARYNGEAL PENETRATION WITHOUT ASPIRATION. PLEASE SEE SPEECH 

PATHOLOGIST REPORT FOR OTHER FINDINGS AND RECOMMENDATIONS.


 














Assessment and Plan





- Diagnosis


(1) Hyponatremia


Is this a current diagnosis for this admission?: Yes   


Plan: 


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


Received 3 doses of tolvaptan during this admission 3/17, 3/18, 3/21


Was on scheduled antipsychotics which was been DC'd


Lasix has been on hold. Has been on fluid restriction 


Urine sodium 5. Urine osmolarity 768. Urine creatinine 65.4.  A.m. cortisol 

normal, free T4 normal


However, despite all this, patient's sodium continues to decrease now down to 

128 today.  


We will increase frequency of sodium chloride tablets to 3 times daily


I will recheck urine osmolarity and urine sodium as prior urine sodium< 5 is not

very consistent with SIADH


Nephrology following to guide therapy











(2) Acute respiratory failure with hypoxia


Is this a current diagnosis for this admission?: Yes   


Plan: 


Likely secondary to atypical pneumonia from influenza a and underlying COPD.  

Will attempt to wean off oxygen today.








(3) Atrial fibrillation with RVR


Is this a current diagnosis for this admission?: Yes   


Plan: 


Rate controlled. chronic persistent atrial fibrillation.


Continue diltiazem, digoxin, beta-blockers.  Monitor dig level.


Outpatient PCP and cardiology follow-up.








(4) Atypical pneumonia


Is this a current diagnosis for this admission?: Yes   


Plan: 


SPO2 WNL on 2 L. Afebrile.  WBC WNL.


Sputum culture on admission positive for Streptococcus species.


Likely bacterial infection followed viral illness


Received complete dose of ceftriaxone/doxycycline (azithromycin cannot be used 

due to very long QTC on admission)


received 6 days of IV antibiotics. Received 3 days of IV vancomycin. Vancomycin 

was DC'd 3/17/2020.








(5) COPD exacerbation


Is this a current diagnosis for this admission?: Yes   


Plan: 


Acute exacerbation has resolved.  Nebs as needed.








(6) Dysphagia


Qualifiers: 


   Dysphagia type: oropharyngeal phase   Qualified Code(s): R13.12 - Dysphagia, 

oropharyngeal phase   


Is this a current diagnosis for this admission?: Yes   


Plan: 


Status post modified barium swallow study.


Mechanical soft diet recommended.








(7) Influenza A


Is this a current diagnosis for this admission?: Yes   


Plan: 


Patient tested positive for influenza A on admission and completed a course of 

Tamiflu.








(8) HTN (hypertension)


Is this a current diagnosis for this admission?: Yes   


Plan: 


Euvolemic.  Normotensive.  





Home medications are: captopril 50 mg p.o. twice daily, diltiazem 120 mg p.o. 

daily, Lasix 20 mg p.o. twice daily, metoprolol 25 mg p.o. daily.





Switched captopril to lisinopril for ease of administration.


Continue diltiazem, lisinopril, metoprolol.  PRN hydralazine and metoprolol.


Hold Lasix due to persistent hyponatremia. 


Adjust meds as needed.  Outpatient PCP follow-up.








- Time


Time Spent with patient: Less than 15 minutes

## 2020-03-24 NOTE — RADIOLOGY REPORT (SQ)
EXAM DESCRIPTION:  CHEST SINGLE VIEW



COMPLETED DATE/TIME:  3/24/2020 2:36 pm



REASON FOR STUDY:  SOB



COMPARISON:  3/14/2020



EXAM PARAMETERS:  NUMBER OF VIEWS: One view.

TECHNIQUE: Single frontal radiographic view of the chest acquired.

RADIATION DOSE: NA

LIMITATIONS: None.



FINDINGS:  LUNGS AND PLEURA: Chronic interstitial changes.  There is slightly increased opacification
 in the right upper lobe adjacent to the fissure and in the right base.  Cannot exclude a minimal rig
ht pleural effusion.

MEDIASTINUM AND HILAR STRUCTURES: No masses.  Contour normal.

HEART AND VASCULAR STRUCTURES: Cardiomegaly.  No ronaldo pulmonary edema.

BONES: No acute findings.

HARDWARE: None in the chest.

OTHER: No other significant finding.



IMPRESSION:  Cardiomegaly without ronaldo pulmonary edema.  Cannot exclude airspace disease in the righ
t upper lobe or lower lobe, pneumonia versus atelectasis.



TECHNICAL DOCUMENTATION:  JOB ID:  [id number]

 2011 [employer]- All Rights Reserved



Reading location - IP/workstation name: [email or web address]

## 2020-03-24 NOTE — PDOC PROGRESS REPORT
Subjective


Progress Note for:: 03/24/20


Subjective:: 





Patient was seen today laying in his bed. At the time he was a little more SOB 

then yesterday. He does have fevers/chills and some greenish sputum production. 

He denies chest pain, fevers or chills with it. His urine out put is down 

despite increased fluid intake. Denies any N/V/D/C. 


Reason For Visit: 


CAP,COPD EXACERBATION,ACUTE ON CHRONIC AFIB WITH








Physical Exam


Vital Signs: 


                                        











Temp Pulse Resp BP Pulse Ox


 


 97.4 F   83   16   136/44 H  93 


 


 03/24/20 11:24  03/24/20 13:23  03/24/20 13:23  03/24/20 11:24  03/24/20 13:23








                                 Intake & Output











 03/23/20 03/24/20 03/25/20





 06:59 06:59 06:59


 


Intake Total 350 1158 


 


Output Total 875 530 


 


Balance -525 628 


 


Weight 62.1 kg 62.1 kg 62.1 kg











General appearance: PRESENT: no acute distress, well-developed, well-nourished


Mouth exam: PRESENT: moist, neck supple


Respiratory exam: PRESENT: crackles, rales.  ABSENT: accessory muscle use, clear

to auscultation say, wheezes


Cardiovascular exam: PRESENT: +S1, +S2


GI/Abdominal exam: PRESENT: soft.  ABSENT: tenderness


Extremities exam: PRESENT: other - -trace+ on his legs.  ABSENT: +1 edema, +2 

edema


Neurological exam: PRESENT: alert, awake, oriented to place, oriented to time, 

oriented to situation


Skin exam: PRESENT: dry, intact, warm





Results


Laboratory Results: 


                                        





                                 03/20/20 04:25 





                                 03/24/20 04:47 





                                        











  03/24/20 03/24/20





  04:47 04:47


 


Sodium  128.2 L 


 


Potassium  4.5 


 


Chloride  98 


 


Carbon Dioxide  26 


 


Anion Gap  4 L 


 


BUN  11 


 


Creatinine  0.38 L 


 


Est GFR (African Amer)  > 60 


 


Glucose  84 


 


Calcium  7.1 L 


 


Albumin   1.9 L








                                        











  03/04/20 03/04/20 03/05/20





  21:51 21:51 00:20


 


Creatine Kinase  128  


 


Troponin I   0.057  0.152


 


NT-Pro-B Natriuret Pep   5170 H 














  03/07/20





  13:37


 


Creatine Kinase 


 


Troponin I 


 


NT-Pro-B Natriuret Pep  5250 H











Impressions: 


                                        





Modified Barium Swallow  03/12/20 00:00


IMPRESSION:  TRACE LARYNGEAL PENETRATION WITHOUT ASPIRATION. PLEASE SEE SPEECH 

PATHOLOGIST REPORT FOR OTHER FINDINGS AND RECOMMENDATIONS.


 














Assessment & Plan





- Diagnosis


(1) Hyponatremia


Is this a current diagnosis for this admission?: Yes   


Plan: 


Sodium looks to have dropped. SSRI was recently stopped. Also stopping sodium 

chloride tablets. Chest x-ray shows fluid and worsening atypical pneumonia. 

Starting furosemide 40mg qd. Reassess tomorrow, may possibly need tolvaptan 








(2) Hypocalcemia


Plan: 


corrected calcium is 8.8 with an albumin of 1.9.








(3) HTN (hypertension)


Is this a current diagnosis for this admission?: Yes   


Plan: 


controlled








(4) Atypical pneumonia


Is this a current diagnosis for this admission?: Yes   


Plan: 


starting levofloxacin 750mq qd for 7 days from what looks to be worsening 

pneumonia. Getting a sputum culture








(5) Influenza A


Is this a current diagnosis for this admission?: Yes   


Plan: 


resolved








- Notes


Notes: 





Case was discussed with Dr. Garcias.

## 2020-03-25 LAB
ABSOLUTE RETICS #: 0.1 10^6/UL (ref 0.03–0.12)
ADD MANUAL DIFF: NO
ANION GAP SERPL CALC-SCNC: 5 MMOL/L (ref 5–19)
APPEARANCE UR: CLEAR
APTT PPP: YELLOW S
BASOPHILS # BLD AUTO: 0 10^3/UL (ref 0–0.2)
BASOPHILS NFR BLD AUTO: 0.4 % (ref 0–2)
BILIRUB UR QL STRIP: NEGATIVE
BUN SERPL-MCNC: 11 MG/DL (ref 7–20)
CALCIUM: 7.1 MG/DL (ref 8.4–10.2)
CHLORIDE SERPL-SCNC: 95 MMOL/L (ref 98–107)
CO2 SERPL-SCNC: 26 MMOL/L (ref 22–30)
EOSINOPHIL # BLD AUTO: 0.1 10^3/UL (ref 0–0.6)
EOSINOPHIL NFR BLD AUTO: 0.8 % (ref 0–6)
ERYTHROCYTE [DISTWIDTH] IN BLOOD BY AUTOMATED COUNT: 15.9 % (ref 11.5–14)
FERRITIN SERPL-MCNC: 205 NG/ML (ref 17.9–464)
FOLATE SERPL-MCNC: 5.24 NG/ML (ref 2.76–?)
GLUCOSE SERPL-MCNC: 87 MG/DL (ref 75–110)
GLUCOSE UR STRIP-MCNC: NEGATIVE MG/DL
HCT VFR BLD CALC: 24.9 % (ref 37.9–51)
HGB BLD-MCNC: 9 G/DL (ref 13.5–17)
IRON(TIBC): 29.6 UG/DL (ref 49–181)
KETONES UR STRIP-MCNC: NEGATIVE MG/DL
LYMPHOCYTES # BLD AUTO: 0.5 10^3/UL (ref 0.5–4.7)
LYMPHOCYTES NFR BLD AUTO: 6.1 % (ref 13–45)
MCH RBC QN AUTO: 29 PG (ref 27–33.4)
MCHC RBC AUTO-ENTMCNC: 36.1 G/DL (ref 32–36)
MCV RBC AUTO: 80 FL (ref 80–97)
MONOCYTES # BLD AUTO: 0.4 10^3/UL (ref 0.1–1.4)
MONOCYTES NFR BLD AUTO: 4.9 % (ref 3–13)
NEUTROPHILS # BLD AUTO: 6.9 10^3/UL (ref 1.7–8.2)
NEUTS SEG NFR BLD AUTO: 87.8 % (ref 42–78)
NITRITE UR QL STRIP: NEGATIVE
PH UR STRIP: 8 [PH] (ref 5–9)
PLATELET # BLD: 148 10^3/UL (ref 150–450)
POTASSIUM SERPL-SCNC: 4.3 MMOL/L (ref 3.6–5)
PROT UR STRIP-MCNC: NEGATIVE MG/DL
PSA SERPL-MCNC: 1.4 NG/ML (ref ?–4)
RBC # BLD AUTO: 3.1 10^6/UL (ref 4.35–5.55)
RETICULOCYTE COUNT (AUTO): 3.17 % (ref 0.66–2.85)
SP GR UR STRIP: 1.01
TOTAL CELLS COUNTED % (AUTO): 100 %
UROBILINOGEN UR-MCNC: 2 MG/DL (ref ?–2)
WBC # BLD AUTO: 7.9 10^3/UL (ref 4–10.5)

## 2020-03-25 RX ADMIN — DIGOXIN SCH MG: 0.12 TABLET ORAL at 15:56

## 2020-03-25 RX ADMIN — MESALAMINE SCH MG: 250 CAPSULE ORAL at 17:05

## 2020-03-25 RX ADMIN — MESALAMINE SCH MG: 250 CAPSULE ORAL at 22:41

## 2020-03-25 RX ADMIN — OXYCODONE HYDROCHLORIDE AND ACETAMINOPHEN SCH MG: 500 TABLET ORAL at 08:27

## 2020-03-25 RX ADMIN — ATORVASTATIN CALCIUM SCH MG: 40 TABLET, FILM COATED ORAL at 22:40

## 2020-03-25 RX ADMIN — FUROSEMIDE SCH MG: 40 TABLET ORAL at 09:42

## 2020-03-25 RX ADMIN — IPRATROPIUM BROMIDE SCH MG: 0.5 SOLUTION RESPIRATORY (INHALATION) at 07:41

## 2020-03-25 RX ADMIN — FAMOTIDINE SCH MG: 20 TABLET, FILM COATED ORAL at 09:42

## 2020-03-25 RX ADMIN — DUTASTERIDE SCH MG: 0.5 CAPSULE, LIQUID FILLED ORAL at 08:28

## 2020-03-25 RX ADMIN — TAMSULOSIN HYDROCHLORIDE SCH MG: 0.4 CAPSULE ORAL at 22:40

## 2020-03-25 RX ADMIN — LISINOPRIL SCH MG: 10 TABLET ORAL at 09:41

## 2020-03-25 RX ADMIN — DILTIAZEM HYDROCHLORIDE SCH MG: 120 CAPSULE, COATED, EXTENDED RELEASE ORAL at 08:27

## 2020-03-25 RX ADMIN — ENOXAPARIN SODIUM SCH MG: 40 INJECTION SUBCUTANEOUS at 09:42

## 2020-03-25 RX ADMIN — ACETAMINOPHEN PRN MG: 325 TABLET ORAL at 13:25

## 2020-03-25 RX ADMIN — FAMOTIDINE SCH MG: 20 TABLET, FILM COATED ORAL at 22:40

## 2020-03-25 RX ADMIN — MESALAMINE SCH MG: 250 CAPSULE ORAL at 13:24

## 2020-03-25 RX ADMIN — TOLVAPTAN SCH MG: 15 TABLET ORAL at 15:56

## 2020-03-25 RX ADMIN — FERROUS SULFATE TAB 325 MG (65 MG ELEMENTAL FE) SCH MG: 325 (65 FE) TAB at 12:01

## 2020-03-25 RX ADMIN — METOPROLOL SUCCINATE SCH MG: 25 TABLET, EXTENDED RELEASE ORAL at 09:42

## 2020-03-25 RX ADMIN — MESALAMINE SCH MG: 250 CAPSULE ORAL at 09:42

## 2020-03-25 RX ADMIN — LEVALBUTEROL HYDROCHLORIDE SCH MG: 0.63 SOLUTION RESPIRATORY (INHALATION) at 07:42

## 2020-03-25 RX ADMIN — METOPROLOL SUCCINATE SCH MG: 25 TABLET, EXTENDED RELEASE ORAL at 22:40

## 2020-03-25 RX ADMIN — POTASSIUM CHLORIDE SCH MEQ: 750 TABLET, FILM COATED, EXTENDED RELEASE ORAL at 08:27

## 2020-03-25 RX ADMIN — FLUTICASONE FUROATE, UMECLIDINIUM BROMIDE AND VILANTEROL TRIFENATATE SCH INH: 100; 62.5; 25 POWDER RESPIRATORY (INHALATION) at 09:45

## 2020-03-25 NOTE — PDOC PROGRESS REPORT
Subjective


Progress Note for:: 03/25/20


Subjective:: 


Patient was seen sitting up in bed doing well. He claims that he breathing has 

improved since yesterday. He denies any chest pain, n/v/d/c. Oral intake was up 

to almost 1400mL. He claims to be aware of the fluid restriction. Urine output 

increased to 2175 after starting furosemide. 


Reason For Visit: 


CAP,COPD EXACERBATION,ACUTE ON CHRONIC AFIB WITH








Physical Exam


Vital Signs: 


                                        











Temp Pulse Resp BP Pulse Ox


 


 98.1 F   89   18   121/70   97 


 


 03/25/20 08:20  03/25/20 08:20  03/25/20 08:20  03/25/20 08:20  03/25/20 08:20








                                 Intake & Output











 03/24/20 03/25/20 03/26/20





 06:59 06:59 06:59


 


Intake Total 1158 1312 


 


Output Total 530 2175 


 


Balance 628 -863 


 


Weight 62.1 kg 59.6 kg 











General appearance: PRESENT: no acute distress, well-developed, well-nourished


Mouth exam: PRESENT: moist, neck supple


Neck exam: ABSENT: JVD, tracheal deviation


Respiratory exam: PRESENT: crackles, rales, rhonchi.  ABSENT: accessory muscle 

use, clear to auscultation say, wheezes


Cardiovascular exam: PRESENT: +S1, +S2


GI/Abdominal exam: PRESENT: soft.  ABSENT: tenderness


Extremities exam: ABSENT: pedal edema, +1 edema, +2 edema


Musculoskeletal exam: PRESENT: normal inspection, tenderness


Neurological exam: PRESENT: alert, awake, oriented to person, oriented to place,

oriented to time, oriented to situation


Skin exam: PRESENT: dry, intact, warm.  ABSENT: cyanosis





Results


Laboratory Results: 


                                        





                                 03/25/20 04:44 





                                 03/25/20 04:44 





                                        











  03/24/20 03/25/20 03/25/20





  14:28 04:44 04:44


 


WBC   


 


RBC   


 


Hgb   


 


Hct   


 


MCV   


 


MCH   


 


MCHC   


 


RDW   


 


Plt Count   


 


Seg Neutrophils %   


 


Sodium   125.5 L 


 


Potassium   4.3 


 


Chloride   95 L 


 


Carbon Dioxide   26 


 


Anion Gap   5 


 


BUN   11 


 


Creatinine   0.42 L 


 


Est GFR ( Amer)   > 60 


 


Glucose   87 


 


Serum Osmolality    264 L


 


Calcium   7.1 L 


 


Urine Color   


 


Urine Appearance   


 


Urine pH   


 


Ur Specific Gravity   


 


Urine Protein   


 


Urine Glucose (UA)   


 


Urine Ketones   


 


Urine Blood   


 


Urine Nitrite   


 


Ur Leukocyte Esterase   


 


Urine WBC (Auto)   


 


Urine RBC (Auto)   


 


Urine Osmolality  759  














  03/25/20 03/25/20 03/25/20





  04:44 06:40 06:40


 


WBC  7.9  


 


RBC  3.10 L  


 


Hgb  9.0 L  


 


Hct  24.9 L  


 


MCV  80  


 


MCH  29.0  


 


MCHC  36.1 H  


 


RDW  15.9 H  


 


Plt Count  148 L  


 


Seg Neutrophils %  87.8 H  


 


Sodium   


 


Potassium   


 


Chloride   


 


Carbon Dioxide   


 


Anion Gap   


 


BUN   


 


Creatinine   


 


Est GFR (African Amer)   


 


Glucose   


 


Serum Osmolality   


 


Calcium   


 


Urine Color    YELLOW


 


Urine Appearance    CLEAR


 


Urine pH    8.0


 


Ur Specific Gravity    1.014


 


Urine Protein    NEGATIVE


 


Urine Glucose (UA)    NEGATIVE


 


Urine Ketones    NEGATIVE


 


Urine Blood    SMALL H


 


Urine Nitrite    NEGATIVE


 


Ur Leukocyte Esterase    NEGATIVE


 


Urine WBC (Auto)    5


 


Urine RBC (Auto)    58


 


Urine Osmolality   585 








                                        











  03/04/20 03/04/20 03/05/20





  21:51 21:51 00:20


 


Creatine Kinase  128  


 


Troponin I   0.057  0.152


 


NT-Pro-B Natriuret Pep   5170 H 














  03/07/20





  13:37


 


Creatine Kinase 


 


Troponin I 


 


NT-Pro-B Natriuret Pep  5250 H











Impressions: 


                                        





Modified Barium Swallow  03/12/20 00:00


IMPRESSION:  TRACE LARYNGEAL PENETRATION WITHOUT ASPIRATION. PLEASE SEE SPEECH 

PATHOLOGIST REPORT FOR OTHER FINDINGS AND RECOMMENDATIONS.


 








Chest X-Ray  03/24/20 13:56


IMPRESSION:  Cardiomegaly without ronaldo pulmonary edema.  Cannot exclude 

airspace disease in the right upper lobe or lower lobe, pneumonia versus 

atelectasis.


 














Assessment & Plan





- Diagnosis


(1) Hyponatremia


Is this a current diagnosis for this admission?: Yes   


Plan: 


Sodium decreased some, looks to be SIADH related. will have it rechecked later 

today to make sure it does not continue to drop. If it drops more than the 

furosemide will be stopped. Will look to start tolvaptan or 3% saline depending 

on the sodium. 





With sodium still dropping after rechecking, will start tolvaptan 15mg po qd. 

Possible consideration of being discharged on the medication. If discharged then

he will need close monitoring and possibly only being on the medication 3 to 4 

days a week.








(2) Hypocalcemia


Plan: 


corrected calcium is 8.8 with an albumin of 1.9.








(3) HTN (hypertension)


Qualifiers: 


   Hypertension type: essential hypertension   Qualified Code(s): I10 - 

Essential (primary) hypertension   


Is this a current diagnosis for this admission?: Yes   


Plan: 


controlled








(4) Atypical pneumonia


Is this a current diagnosis for this admission?: Yes   


Plan: 


starting levofloxacin 750mq qd for 7 days from what looks to be worsening 

pneumonia. Getting a sputum culture








(5) Influenza A


Is this a current diagnosis for this admission?: Yes   


Plan: 


resolved








(6) Anemia


Plan: 


will follow up with labs








- Notes


Notes: 





case was discussed with Dr. Garcias.

## 2020-03-25 NOTE — EKG REPORT
SEVERITY:- ABNORMAL ECG -

SINUS RHYTHM

NONSPECIFIC ST-T CHANGES- INFERIOR LEADS AND LATERAL LEADS IMPROVED FROM 3/4/20 EKG

[facility]

:

Confirmed by: Devante Serrato MD 25-Mar-2020 11:11:39

## 2020-03-25 NOTE — PDOC PROGRESS REPORT
Subjective


Progress Note for:: 03/25/20


Subjective:: 


Patient complaining of lower chest pain today.  However EKG is normal.  Denies 

any other symptoms.  Denies shortness of breath or cough.


Reason For Visit: 


CAP,COPD EXACERBATION,ACUTE ON CHRONIC AFIB WITH








Physical Exam


Vital Signs: 


                                        











Temp Pulse Resp BP Pulse Ox


 


 98.1 F   89   18   121/70   97 


 


 03/25/20 08:20  03/25/20 08:20  03/25/20 08:20  03/25/20 08:20  03/25/20 08:20








                                 Intake & Output











 03/24/20 03/25/20 03/26/20





 06:59 06:59 06:59


 


Intake Total 1158 1312 


 


Output Total 530 2175 


 


Balance 628 -863 


 


Weight 62.1 kg 59.6 kg 











General appearance: PRESENT: no acute distress, cooperative


Neck exam: ABSENT: JVD


Respiratory exam: PRESENT: clear to auscultation say, unlabored.  ABSENT: 

tachypnea, wheezes


Cardiovascular exam: PRESENT: RRR, +S1, +S2.  ABSENT: tachycardia


GI/Abdominal exam: PRESENT: soft.  ABSENT: rebound, rigid, tenderness


Neurological exam: PRESENT: alert, awake





Results


Laboratory Results: 


                                        





                                 03/25/20 04:44 





                                 03/25/20 04:44 





                                        











  03/24/20 03/25/20 03/25/20





  14:28 04:44 04:44


 


WBC   


 


RBC   


 


Hgb   


 


Hct   


 


MCV   


 


MCH   


 


MCHC   


 


RDW   


 


Plt Count   


 


Seg Neutrophils %   


 


Sodium   125.5 L 


 


Potassium   4.3 


 


Chloride   95 L 


 


Carbon Dioxide   26 


 


Anion Gap   5 


 


BUN   11 


 


Creatinine   0.42 L 


 


Est GFR ( Amer)   > 60 


 


Glucose   87 


 


Serum Osmolality    264 L


 


Calcium   7.1 L 


 


Urine Color   


 


Urine Appearance   


 


Urine pH   


 


Ur Specific Gravity   


 


Urine Protein   


 


Urine Glucose (UA)   


 


Urine Ketones   


 


Urine Blood   


 


Urine Nitrite   


 


Ur Leukocyte Esterase   


 


Urine WBC (Auto)   


 


Urine RBC (Auto)   


 


Urine Osmolality  759  














  03/25/20 03/25/20 03/25/20





  04:44 06:40 06:40


 


WBC  7.9  


 


RBC  3.10 L  


 


Hgb  9.0 L  


 


Hct  24.9 L  


 


MCV  80  


 


MCH  29.0  


 


MCHC  36.1 H  


 


RDW  15.9 H  


 


Plt Count  148 L  


 


Seg Neutrophils %  87.8 H  


 


Sodium   


 


Potassium   


 


Chloride   


 


Carbon Dioxide   


 


Anion Gap   


 


BUN   


 


Creatinine   


 


Est GFR (African Amer)   


 


Glucose   


 


Serum Osmolality   


 


Calcium   


 


Urine Color    YELLOW


 


Urine Appearance    CLEAR


 


Urine pH    8.0


 


Ur Specific Gravity    1.014


 


Urine Protein    NEGATIVE


 


Urine Glucose (UA)    NEGATIVE


 


Urine Ketones    NEGATIVE


 


Urine Blood    SMALL H


 


Urine Nitrite    NEGATIVE


 


Ur Leukocyte Esterase    NEGATIVE


 


Urine WBC (Auto)    5


 


Urine RBC (Auto)    58


 


Urine Osmolality   585 








                                        











  03/04/20 03/04/20 03/05/20





  21:51 21:51 00:20


 


Creatine Kinase  128  


 


Troponin I   0.057  0.152


 


NT-Pro-B Natriuret Pep   5170 H 














  03/07/20





  13:37


 


Creatine Kinase 


 


Troponin I 


 


NT-Pro-B Natriuret Pep  5250 H











Impressions: 


                                        





Modified Barium Swallow  03/12/20 00:00


IMPRESSION:  TRACE LARYNGEAL PENETRATION WITHOUT ASPIRATION. PLEASE SEE SPEECH 

PATHOLOGIST REPORT FOR OTHER FINDINGS AND RECOMMENDATIONS.


 








Chest X-Ray  03/24/20 13:56


IMPRESSION:  Cardiomegaly without ronaldo pulmonary edema.  Cannot exclude 

airspace disease in the right upper lobe or lower lobe, pneumonia versus 

atelectasis.


 














Assessment and Plan





- Diagnosis


(1) Hyponatremia


Is this a current diagnosis for this admission?: Yes   


Plan: 


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


Received 3 doses of tolvaptan during this admission 3/17, 3/18, 3/21


Was on scheduled antipsychotics which was been DC'd


Repeat urine osmolarity and urine sodium done yesterday before administration of

Lasix showed elevated osmolarity and sodium levels consistent with SIADH.  Free 

T4 and a.m. cortisol levels are normal.


Sodium continues to drop despite fluid restriction, Lasix and sodium chloride 

tablets.  Sodium chloride tablets discontinued by nephrology yesterday.  Monitor

BMP closely.


Nephrology following to guide therapy.











(2) Acute respiratory failure with hypoxia


Is this a current diagnosis for this admission?: Yes   


Plan: 


Likely secondary to atypical pneumonia from influenza a and underlying COPD.  

Will attempt to wean off oxygen today.








(3) Atrial fibrillation with RVR


Is this a current diagnosis for this admission?: Yes   


Plan: 


Rate controlled. chronic persistent atrial fibrillation. Continue diltiazem, 

digoxin, beta-blockers. Monitor dig level.


Outpatient PCP and cardiology follow-up.








(4) Atypical pneumonia


Is this a current diagnosis for this admission?: Yes   


Plan: 


SPO2 WNL on 2 L. Afebrile.  WBC WNL. Sputum culture on admission positive for 

Streptococcus species.


Likely bacterial infection followed Influenza. Received complete dose of 

ceftriaxone/doxycycline (azithromycin cannot be used due to very long QTC on 

admission)


Received 6 days of IV antibiotics. Received 3 days of IV vancomycin. Vancomycin 

was DC'd 3/17/2020.


No need for further antibiotics at this time. Reviewed repeat CXR.








(5) COPD exacerbation


Is this a current diagnosis for this admission?: Yes   


Plan: 


Acute exacerbation has resolved.  Nebs as needed.








(6) Dysphagia


Qualifiers: 


   Dysphagia type: oropharyngeal phase   Qualified Code(s): R13.12 - Dysphagia, 

oropharyngeal phase   


Is this a current diagnosis for this admission?: Yes   


Plan: 


Status post modified barium swallow study. Mechanical soft diet recommended.








(7) Influenza A


Is this a current diagnosis for this admission?: Yes   


Plan: 


Patient tested positive for influenza A on admission and completed a course of 

Tamiflu.








(8) HTN (hypertension)


Qualifiers: 


   Hypertension type: essential hypertension   Qualified Code(s): I10 - 

Essential (primary) hypertension   


Is this a current diagnosis for this admission?: Yes   


Plan: 


Euvolemic.  Normotensive.  





Home medications are: captopril 50 mg p.o. twice daily, diltiazem 120 mg p.o. 

daily, Lasix 20 mg p.o. twice daily, metoprolol 25 mg p.o. daily.





Switched captopril to lisinopril for ease of administration.


Continue diltiazem, lisinopril, metoprolol.  PRN hydralazine and metoprolol.


Adjust meds as needed.  Outpatient PCP follow-up.








- Time


Time Spent with patient: Less than 15 minutes

## 2020-03-26 LAB
ANION GAP SERPL CALC-SCNC: 5 MMOL/L (ref 5–19)
ANION GAP SERPL CALC-SCNC: 7 MMOL/L (ref 5–19)
BUN SERPL-MCNC: 14 MG/DL (ref 7–20)
BUN SERPL-MCNC: 14 MG/DL (ref 7–20)
CALCIUM: 7.6 MG/DL (ref 8.4–10.2)
CALCIUM: 7.6 MG/DL (ref 8.4–10.2)
CHLORIDE SERPL-SCNC: 99 MMOL/L (ref 98–107)
CHLORIDE SERPL-SCNC: 99 MMOL/L (ref 98–107)
CO2 SERPL-SCNC: 25 MMOL/L (ref 22–30)
CO2 SERPL-SCNC: 27 MMOL/L (ref 22–30)
GLUCOSE SERPL-MCNC: 100 MG/DL (ref 75–110)
GLUCOSE SERPL-MCNC: 110 MG/DL (ref 75–110)
POTASSIUM SERPL-SCNC: 4.2 MMOL/L (ref 3.6–5)
POTASSIUM SERPL-SCNC: 4.4 MMOL/L (ref 3.6–5)

## 2020-03-26 RX ADMIN — FLUTICASONE FUROATE, UMECLIDINIUM BROMIDE AND VILANTEROL TRIFENATATE SCH: 100; 62.5; 25 POWDER RESPIRATORY (INHALATION) at 11:19

## 2020-03-26 RX ADMIN — OXYCODONE HYDROCHLORIDE AND ACETAMINOPHEN SCH: 500 TABLET ORAL at 11:18

## 2020-03-26 RX ADMIN — MEGESTROL ACETATE SCH: 40 SUSPENSION ORAL at 17:53

## 2020-03-26 RX ADMIN — POTASSIUM CHLORIDE SCH: 750 TABLET, FILM COATED, EXTENDED RELEASE ORAL at 11:18

## 2020-03-26 RX ADMIN — FERROUS SULFATE TAB 325 MG (65 MG ELEMENTAL FE) SCH: 325 (65 FE) TAB at 11:19

## 2020-03-26 RX ADMIN — LISINOPRIL SCH: 10 TABLET ORAL at 11:19

## 2020-03-26 RX ADMIN — METOPROLOL SUCCINATE SCH MG: 25 TABLET, EXTENDED RELEASE ORAL at 21:56

## 2020-03-26 RX ADMIN — ENOXAPARIN SODIUM SCH: 40 INJECTION SUBCUTANEOUS at 11:18

## 2020-03-26 RX ADMIN — MESALAMINE SCH MG: 250 CAPSULE ORAL at 17:48

## 2020-03-26 RX ADMIN — MESALAMINE SCH: 250 CAPSULE ORAL at 14:08

## 2020-03-26 RX ADMIN — MESALAMINE SCH: 250 CAPSULE ORAL at 11:19

## 2020-03-26 RX ADMIN — METOPROLOL SUCCINATE SCH: 25 TABLET, EXTENDED RELEASE ORAL at 11:19

## 2020-03-26 RX ADMIN — TAMSULOSIN HYDROCHLORIDE SCH MG: 0.4 CAPSULE ORAL at 21:56

## 2020-03-26 RX ADMIN — FAMOTIDINE SCH MG: 20 TABLET, FILM COATED ORAL at 21:56

## 2020-03-26 RX ADMIN — MESALAMINE SCH MG: 250 CAPSULE ORAL at 21:56

## 2020-03-26 RX ADMIN — FAMOTIDINE SCH: 20 TABLET, FILM COATED ORAL at 11:19

## 2020-03-26 RX ADMIN — DUTASTERIDE SCH: 0.5 CAPSULE, LIQUID FILLED ORAL at 11:18

## 2020-03-26 RX ADMIN — DIGOXIN SCH MG: 0.12 TABLET ORAL at 16:48

## 2020-03-26 RX ADMIN — DILTIAZEM HYDROCHLORIDE SCH: 120 CAPSULE, COATED, EXTENDED RELEASE ORAL at 11:18

## 2020-03-26 RX ADMIN — ATORVASTATIN CALCIUM SCH MG: 40 TABLET, FILM COATED ORAL at 21:56

## 2020-03-26 NOTE — PDOC PROGRESS REPORT
Subjective


Progress Note for:: 03/26/20


Subjective:: 





Patient has no complaints today.  States that he feels well.


Reason For Visit: 


CAP,COPD EXACERBATION,ACUTE ON CHRONIC AFIB WITH








Physical Exam


Vital Signs: 


                                        











Temp Pulse Resp BP Pulse Ox


 


 98.3 F   84   19   130/46 H  90 L


 


 03/26/20 03:16  03/26/20 07:00  03/26/20 03:16  03/26/20 03:16  03/26/20 03:16








                                 Intake & Output











 03/25/20 03/26/20 03/27/20





 06:59 06:59 06:59


 


Intake Total 1312 1001 


 


Output Total 2175 3000 


 


Balance -863 -1999 


 


Weight 59.6 kg 57.6 kg 











General appearance: PRESENT: no acute distress, cooperative


Neck exam: ABSENT: JVD


Respiratory exam: PRESENT: clear to auscultation say


Neurological exam: PRESENT: alert, awake


Skin exam: ABSENT: jaundice





Results


Laboratory Results: 


                                        





                                 03/25/20 04:44 





                                 03/26/20 04:30 





                                        











  03/25/20 03/25/20 03/26/20





  12:55 12:55 04:30


 


Retic Count (auto)  3.17 H  


 


Sodium   124.1 L  130.8 L


 


Potassium    4.4


 


Chloride    99


 


Carbon Dioxide    25


 


Anion Gap    7


 


BUN    14


 


Creatinine    0.49 L


 


Est GFR ( Amer)    > 60


 


Glucose    110


 


Calcium    7.6 L


 


Iron   29.6 L 


 


TIBC   212 L 


 


% Saturation   14 


 


Ferritin   205.00 


 


Prostate Specific Ag   1.400 


 


Vitamin B12   982.0 H 


 


Folate   5.24 








                                        











  03/04/20 03/04/20 03/05/20





  21:51 21:51 00:20


 


Creatine Kinase  128  


 


Troponin I   0.057  0.152


 


NT-Pro-B Natriuret Pep   5170 H 














  03/07/20





  13:37


 


Creatine Kinase 


 


Troponin I 


 


NT-Pro-B Natriuret Pep  5250 H











Impressions: 


                                        





Modified Barium Swallow  03/12/20 00:00


IMPRESSION:  TRACE LARYNGEAL PENETRATION WITHOUT ASPIRATION. PLEASE SEE SPEECH 

PATHOLOGIST REPORT FOR OTHER FINDINGS AND RECOMMENDATIONS.


 








Chest X-Ray  03/24/20 13:56


IMPRESSION:  Cardiomegaly without ronaldo pulmonary edema.  Cannot exclude 

airspace disease in the right upper lobe or lower lobe, pneumonia versus 

atelectasis.


 














Assessment and Plan





- Diagnosis


(1) Hyponatremia


Is this a current diagnosis for this admission?: Yes   


Plan: 


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


Received 3 doses of tolvaptan during this admission 3/17, 3/18, 3/21


Was on scheduled antipsychotics which was been DC'd


Repeat urine osmolarity and urine sodium done yesterday before administration of

Lasix showed elevated osmolarity and sodium levels consistent with SIADH.  Free 

T4 and a.m. cortisol levels are normal.


Sodium continues to drop despite fluid restriction, Lasix and sodium chloride 

tablets.  Had a started on tolvaptan yesterday with significant improvement in 

sodium level to 130 today.


We will continue to monitor sodium level tomorrow morning.


Nephrology following to guide therapy.  Plan to discharge patient on tolvaptan 

with close outpatient follow-up.








(2) Acute respiratory failure with hypoxia


Is this a current diagnosis for this admission?: Yes   


Plan: 


Likely secondary to atypical pneumonia from influenza a and underlying COPD.  

Will attempt to wean off oxygen today.








(3) Atrial fibrillation with RVR


Is this a current diagnosis for this admission?: Yes   


Plan: 


Rate controlled. chronic persistent atrial fibrillation. Continue diltiazem, 

digoxin, beta-blockers. Monitor dig level.


Patient has not been on anticoagulation for over a year.  Likely secondary to 

his falls and feeble state.  I will defer to outpatient cardiologist and 

patient's primary care provider for determination of anticoagulation necessity.











(4) Atypical pneumonia


Is this a current diagnosis for this admission?: Yes   


Plan: 


SPO2 WNL on 2 L. Afebrile.  WBC WNL. Sputum culture on admission positive for 

Streptococcus species.


Likely bacterial infection followed Influenza. Received complete dose of 

ceftriaxone/doxycycline (azithromycin cannot be used due to very long QTC on 

admission)


Received 6 days of IV antibiotics. Received 3 days of IV vancomycin. Vancomycin 

was DC'd 3/17/2020.


No need for further antibiotics at this time. Reviewed repeat CXR.








(5) COPD exacerbation


Is this a current diagnosis for this admission?: Yes   


Plan: 


Acute exacerbation has resolved.  Nebs as needed.








(6) Dysphagia


Qualifiers: 


   Dysphagia type: oropharyngeal phase   Qualified Code(s): R13.12 - Dysphagia, 

oropharyngeal phase   


Is this a current diagnosis for this admission?: Yes   


Plan: 


Status post modified barium swallow study. Mechanical soft diet recommended.








(7) Influenza A


Is this a current diagnosis for this admission?: Yes   


Plan: 


Patient tested positive for influenza A on admission and completed a course of 

Tamiflu.








(8) HTN (hypertension)


Qualifiers: 


   Hypertension type: essential hypertension   Qualified Code(s): I10 - 

Essential (primary) hypertension   


Is this a current diagnosis for this admission?: Yes   


Plan: 


Euvolemic.  Normotensive.  





Home medications are: captopril 50 mg p.o. twice daily, diltiazem 120 mg p.o. 

daily, Lasix 20 mg p.o. twice daily, metoprolol 25 mg p.o. daily.





Switched captopril to lisinopril for ease of administration.


Continue diltiazem, lisinopril, metoprolol.  PRN hydralazine and metoprolol.


Adjust meds as needed.  Outpatient PCP follow-up.








- Plan Summary


Summary: 


Plan to discharge to SNF tomorrow





- Time


Time Spent with patient: Less than 15 minutes


Anticipated discharge: SNF


Within: within 24 hours

## 2020-03-26 NOTE — PDOC PROGRESS REPORT
Subjective


Progress Note for:: 03/26/20


Subjective:: 


Patient was seen laying in bed this morning. At this time he was feeling a 

little nauseous. He claims to have nausea every now and then in the AM. Other 

than that his SOB is improved. He denies vomiting, diarrhea or constipation. 

Urine output was up to 3,000mLs with start tolvaptan yesterday. 


Reason For Visit: 


CAP,COPD EXACERBATION,ACUTE ON CHRONIC AFIB WITH








Physical Exam


Vital Signs: 


                                        











Temp Pulse Resp BP Pulse Ox


 


 98.4 F   84   18   112/45 L  96 


 


 03/26/20 08:21  03/26/20 08:21  03/26/20 08:21  03/26/20 08:21  03/26/20 08:21








                                 Intake & Output











 03/25/20 03/26/20 03/27/20





 06:59 06:59 06:59


 


Intake Total 1312 1001 


 


Output Total 2175 3000 


 


Balance -863 -1999 


 


Weight 59.6 kg 57.6 kg 











General appearance: PRESENT: no acute distress, well-developed, well-nourished


Mouth exam: PRESENT: moist, neck supple


Neck exam: ABSENT: JVD, tracheal deviation


Respiratory exam: PRESENT: crackles, rales.  ABSENT: accessory muscle use, clear

to auscultation say, wheezes


Cardiovascular exam: PRESENT: +S1, +S2


GI/Abdominal exam: PRESENT: soft.  ABSENT: tenderness


Extremities exam: ABSENT: pedal edema, +1 edema, +2 edema


Neurological exam: PRESENT: alert, awake, oriented to person, oriented to place,

oriented to time, oriented to situation


Skin exam: PRESENT: dry, intact, warm





Results


Laboratory Results: 


                                        





                                 03/25/20 04:44 





                                 03/26/20 04:30 





                                        











  03/25/20 03/25/20 03/26/20





  12:55 12:55 04:30


 


Retic Count (auto)  3.17 H  


 


Sodium   124.1 L  130.8 L


 


Potassium    4.4


 


Chloride    99


 


Carbon Dioxide    25


 


Anion Gap    7


 


BUN    14


 


Creatinine    0.49 L


 


Est GFR ( Amer)    > 60


 


Glucose    110


 


Calcium    7.6 L


 


Iron   29.6 L 


 


TIBC   212 L 


 


% Saturation   14 


 


Ferritin   205.00 


 


Prostate Specific Ag   1.400 


 


Vitamin B12   982.0 H 


 


Folate   5.24 








                                        











  03/04/20 03/04/20 03/05/20





  21:51 21:51 00:20


 


Creatine Kinase  128  


 


Troponin I   0.057  0.152


 


NT-Pro-B Natriuret Pep   5170 H 














  03/07/20





  13:37


 


Creatine Kinase 


 


Troponin I 


 


NT-Pro-B Natriuret Pep  5250 H











Impressions: 


                                        





Modified Barium Swallow  03/12/20 00:00


IMPRESSION:  TRACE LARYNGEAL PENETRATION WITHOUT ASPIRATION. PLEASE SEE SPEECH 

PATHOLOGIST REPORT FOR OTHER FINDINGS AND RECOMMENDATIONS.


 








Chest X-Ray  03/24/20 13:56


IMPRESSION:  Cardiomegaly without ronaldo pulmonary edema.  Cannot exclude 

airspace disease in the right upper lobe or lower lobe, pneumonia versus 

atelectasis.


 














Assessment & Plan





- Diagnosis


(1) Hyponatremia


Is this a current diagnosis for this admission?: Yes   


Plan: 


Sodium improved to 130. Due to the rapid increase with tolvaptan, I will look to

have him take it MWF. Holding the tolvaptan today to prevent a too rapid of a 

correction. 








(2) Hypocalcemia


Plan: 


improving








(3) HTN (hypertension)


Qualifiers: 


   Hypertension type: essential hypertension   Qualified Code(s): I10 - 

Essential (primary) hypertension   


Is this a current diagnosis for this admission?: Yes   


Plan: 


controlled








(4) Atypical pneumonia


Is this a current diagnosis for this admission?: Yes   


Plan: 


per hospitalist








(5) Influenza A


Is this a current diagnosis for this admission?: Yes   


Plan: 


resolved








(6) Anemia


Plan: 


discussed with the patient about IV injectofer. He was willing receive it if his

iron levels were low. With the hemoglobin at 9.0, will look to load him with 1 

dose of IV injectofer 750mg. Then will look to continue PO iron.

## 2020-03-27 LAB
ADD MANUAL DIFF: NO
ANION GAP SERPL CALC-SCNC: 3 MMOL/L (ref 5–19)
ANION GAP SERPL CALC-SCNC: 6 MMOL/L (ref 5–19)
BASOPHILS # BLD AUTO: 0 10^3/UL (ref 0–0.2)
BASOPHILS NFR BLD AUTO: 0.3 % (ref 0–2)
BUN SERPL-MCNC: 14 MG/DL (ref 7–20)
BUN SERPL-MCNC: 14 MG/DL (ref 7–20)
CALCIUM: 7.2 MG/DL (ref 8.4–10.2)
CALCIUM: 7.3 MG/DL (ref 8.4–10.2)
CHLORIDE SERPL-SCNC: 98 MMOL/L (ref 98–107)
CHLORIDE SERPL-SCNC: 98 MMOL/L (ref 98–107)
CO2 SERPL-SCNC: 24 MMOL/L (ref 22–30)
CO2 SERPL-SCNC: 26 MMOL/L (ref 22–30)
EOSINOPHIL # BLD AUTO: 0 10^3/UL (ref 0–0.6)
EOSINOPHIL NFR BLD AUTO: 0.7 % (ref 0–6)
ERYTHROCYTE [DISTWIDTH] IN BLOOD BY AUTOMATED COUNT: 16.3 % (ref 11.5–14)
GLUCOSE SERPL-MCNC: 107 MG/DL (ref 75–110)
GLUCOSE SERPL-MCNC: 92 MG/DL (ref 75–110)
HCT VFR BLD CALC: 25.6 % (ref 37.9–51)
HGB BLD-MCNC: 9.3 G/DL (ref 13.5–17)
LYMPHOCYTES # BLD AUTO: 0.8 10^3/UL (ref 0.5–4.7)
LYMPHOCYTES NFR BLD AUTO: 12.2 % (ref 13–45)
MCH RBC QN AUTO: 29.1 PG (ref 27–33.4)
MCHC RBC AUTO-ENTMCNC: 36.2 G/DL (ref 32–36)
MCV RBC AUTO: 80 FL (ref 80–97)
MONOCYTES # BLD AUTO: 0.5 10^3/UL (ref 0.1–1.4)
MONOCYTES NFR BLD AUTO: 6.6 % (ref 3–13)
NEUTROPHILS # BLD AUTO: 5.6 10^3/UL (ref 1.7–8.2)
NEUTS SEG NFR BLD AUTO: 80.2 % (ref 42–78)
PLATELET # BLD: 129 10^3/UL (ref 150–450)
POTASSIUM SERPL-SCNC: 4.1 MMOL/L (ref 3.6–5)
POTASSIUM SERPL-SCNC: 4.3 MMOL/L (ref 3.6–5)
RBC # BLD AUTO: 3.19 10^6/UL (ref 4.35–5.55)
TOTAL CELLS COUNTED % (AUTO): 100 %
WBC # BLD AUTO: 7 10^3/UL (ref 4–10.5)

## 2020-03-27 RX ADMIN — MESALAMINE SCH MG: 250 CAPSULE ORAL at 15:04

## 2020-03-27 RX ADMIN — OXYCODONE HYDROCHLORIDE AND ACETAMINOPHEN SCH MG: 500 TABLET ORAL at 11:18

## 2020-03-27 RX ADMIN — MEGESTROL ACETATE SCH MG: 40 SUSPENSION ORAL at 11:19

## 2020-03-27 RX ADMIN — TAMSULOSIN HYDROCHLORIDE SCH MG: 0.4 CAPSULE ORAL at 22:37

## 2020-03-27 RX ADMIN — FLUTICASONE FUROATE, UMECLIDINIUM BROMIDE AND VILANTEROL TRIFENATATE SCH INH: 100; 62.5; 25 POWDER RESPIRATORY (INHALATION) at 11:20

## 2020-03-27 RX ADMIN — POTASSIUM CHLORIDE SCH MEQ: 750 TABLET, FILM COATED, EXTENDED RELEASE ORAL at 11:18

## 2020-03-27 RX ADMIN — ENOXAPARIN SODIUM SCH: 40 INJECTION SUBCUTANEOUS at 11:21

## 2020-03-27 RX ADMIN — FERROUS SULFATE TAB 325 MG (65 MG ELEMENTAL FE) SCH MG: 325 (65 FE) TAB at 11:18

## 2020-03-27 RX ADMIN — METOPROLOL SUCCINATE SCH MG: 25 TABLET, EXTENDED RELEASE ORAL at 22:37

## 2020-03-27 RX ADMIN — DILTIAZEM HYDROCHLORIDE SCH MG: 120 CAPSULE, COATED, EXTENDED RELEASE ORAL at 11:18

## 2020-03-27 RX ADMIN — DIGOXIN SCH MG: 0.12 TABLET ORAL at 17:26

## 2020-03-27 RX ADMIN — MESALAMINE SCH MG: 250 CAPSULE ORAL at 22:37

## 2020-03-27 RX ADMIN — FAMOTIDINE SCH: 20 TABLET, FILM COATED ORAL at 11:47

## 2020-03-27 RX ADMIN — TOLVAPTAN SCH MG: 15 TABLET ORAL at 11:26

## 2020-03-27 RX ADMIN — ATORVASTATIN CALCIUM SCH MG: 40 TABLET, FILM COATED ORAL at 22:37

## 2020-03-27 RX ADMIN — LISINOPRIL SCH MG: 10 TABLET ORAL at 11:18

## 2020-03-27 RX ADMIN — METOPROLOL SUCCINATE SCH MG: 25 TABLET, EXTENDED RELEASE ORAL at 11:18

## 2020-03-27 RX ADMIN — MESALAMINE SCH MG: 250 CAPSULE ORAL at 17:26

## 2020-03-27 RX ADMIN — FAMOTIDINE SCH: 20 TABLET, FILM COATED ORAL at 22:38

## 2020-03-27 RX ADMIN — DUTASTERIDE SCH MG: 0.5 CAPSULE, LIQUID FILLED ORAL at 11:19

## 2020-03-27 RX ADMIN — MESALAMINE SCH MG: 250 CAPSULE ORAL at 11:20

## 2020-03-27 NOTE — PDOC PROGRESS REPORT
Subjective


Progress Note for:: 03/27/20


Subjective:: 


Patient was seen laying in bed this morning. He is currently doing well other 

than being a little sleepy. Urine output was down to 900 when off of tolvaptan. 

At this point he is waiting nursing home placement.


Reason For Visit: 


CAP,COPD EXACERBATION,ACUTE ON CHRONIC AFIB WITH








Physical Exam


Vital Signs: 


                                        











Temp Pulse Resp BP Pulse Ox


 


 98.3 F   90   16   129/48 H  96 


 


 03/27/20 07:49  03/27/20 07:49  03/27/20 07:49  03/27/20 07:49  03/27/20 07:49








                                 Intake & Output











 03/26/20 03/27/20 03/28/20





 06:59 06:59 06:59


 


Intake Total 1001 2100 


 


Output Total 3000 950 


 


Balance -1999 1150 


 


Weight 57.6 kg 58.8 kg 











General appearance: PRESENT: no acute distress, well-developed, well-nourished


Mouth exam: PRESENT: moist, neck supple


Respiratory exam: PRESENT: crackles, rhonchi.  ABSENT: accessory muscle use, 

clear to auscultation say, wheezes


Cardiovascular exam: PRESENT: +S1, +S2


GI/Abdominal exam: PRESENT: soft.  ABSENT: tenderness


Extremities exam: ABSENT: pedal edema, +1 edema, +2 edema


Neurological exam: PRESENT: alert, awake, oriented to person, oriented to place,

oriented to time, oriented to situation


Skin exam: PRESENT: dry, intact, warm





Results


Laboratory Results: 


                                        





                                 03/27/20 04:51 





                                 03/27/20 04:51 





                                        











  03/26/20 03/27/20 03/27/20





  16:40 04:51 04:51


 


WBC   7.0 


 


RBC   3.19 L 


 


Hgb   9.3 L 


 


Hct   25.6 L 


 


MCV   80 


 


MCH   29.1 


 


MCHC   36.2 H 


 


RDW   16.3 H 


 


Plt Count   129 L 


 


Seg Neutrophils %   80.2 H 


 


Sodium  130.8 L   128.3 L


 


Potassium  4.2   4.1


 


Chloride  99   98


 


Carbon Dioxide  27   24


 


Anion Gap  5   6


 


BUN  14   14


 


Creatinine  0.60   0.46 L


 


Est GFR ( Amer)  > 60   > 60


 


Glucose  100   107


 


Calcium  7.6 L   7.2 L








                                        











  03/04/20 03/04/20 03/05/20





  21:51 21:51 00:20


 


Creatine Kinase  128  


 


Troponin I   0.057  0.152


 


NT-Pro-B Natriuret Pep   5170 H 














  03/07/20





  13:37


 


Creatine Kinase 


 


Troponin I 


 


NT-Pro-B Natriuret Pep  5250 H











Impressions: 


                                        





Modified Barium Swallow  03/12/20 00:00


IMPRESSION:  TRACE LARYNGEAL PENETRATION WITHOUT ASPIRATION. PLEASE SEE SPEECH 

PATHOLOGIST REPORT FOR OTHER FINDINGS AND RECOMMENDATIONS.


 








Chest X-Ray  03/24/20 13:56


IMPRESSION:  Cardiomegaly without ronaldo pulmonary edema.  Cannot exclude 

airspace disease in the right upper lobe or lower lobe, pneumonia versus 

atelectasis.


 














Assessment & Plan





- Diagnosis


(1) Hyponatremia


Is this a current diagnosis for this admission?: Yes   


Plan: 


Off of the tolvaptan he does not look to be able to maintain a good sodium 

level. Will look to restart the tolvaptan today and will monitor his sodium with

starting it again. Patient looks like he will need it every other day once d

ischarged. A sodium in no lower than 130 is stable for discharge if discharged 

on tolvaptan every other day. If discharged the nursing facility needs to draw a

weekly sodium for the first 3 weeks out of the hospital so the tolvaptan can be 

dosed properly.








(2) Hypocalcemia


Plan: 


corrected is WNL








(3) HTN (hypertension)


Qualifiers: 


   Hypertension type: essential hypertension   Qualified Code(s): I10 - 

Essential (primary) hypertension   


Is this a current diagnosis for this admission?: Yes   


Plan: 


controlled








(4) Atypical pneumonia


Is this a current diagnosis for this admission?: Yes   


Plan: 


per hospitalist








(5) Influenza A


Is this a current diagnosis for this admission?: Yes   





(6) Anemia


Plan: 


Recently received IV injectofer. Hemoglobin has already gone up slightly.

## 2020-03-27 NOTE — PROGRESS NOTE
Provider Note


Provider Note: 





Patient's repeat sodium level has not improved since morning.  He did receive a 

dose of tolvaptan today after tolvaptan was held yesterday.  Will allow some 

more time for patient's sodium level to improve.  Vital signs are stable.  I 

have done discharge summary as patient is planning to go to [facility] who 

requested discharge summary prior to 4pm.  However, nephrology recommends 

discharge once patient's sodium which is 130.  As a result patient will stay 

here for today and will give the tolvaptan some time to kick in repeat BMP in 

the morning.  If BMP improves closer to 130, patient can be discharged to SNF 

with follow-up BMP to be done in 1 week.

## 2020-03-28 LAB
ANION GAP SERPL CALC-SCNC: 6 MMOL/L (ref 5–19)
BUN SERPL-MCNC: 12 MG/DL (ref 7–20)
CALCIUM: 6.8 MG/DL (ref 8.4–10.2)
CHLORIDE SERPL-SCNC: 102 MMOL/L (ref 98–107)
CO2 SERPL-SCNC: 22 MMOL/L (ref 22–30)
GLUCOSE SERPL-MCNC: 100 MG/DL (ref 75–110)
OSMOLALITY,URINE: 353 MOSM/KG (ref 300–900)
POTASSIUM SERPL-SCNC: 4.2 MMOL/L (ref 3.6–5)
URINE SODIUM: 79 MMOL/L (ref 30–90)

## 2020-03-28 RX ADMIN — METOPROLOL SUCCINATE SCH MG: 25 TABLET, EXTENDED RELEASE ORAL at 11:02

## 2020-03-28 RX ADMIN — TAMSULOSIN HYDROCHLORIDE SCH MG: 0.4 CAPSULE ORAL at 21:27

## 2020-03-28 RX ADMIN — DUTASTERIDE SCH MG: 0.5 CAPSULE, LIQUID FILLED ORAL at 08:00

## 2020-03-28 RX ADMIN — MEGESTROL ACETATE SCH MG: 40 SUSPENSION ORAL at 11:01

## 2020-03-28 RX ADMIN — FAMOTIDINE SCH: 20 TABLET, FILM COATED ORAL at 21:28

## 2020-03-28 RX ADMIN — POTASSIUM CHLORIDE SCH MEQ: 750 TABLET, FILM COATED, EXTENDED RELEASE ORAL at 08:00

## 2020-03-28 RX ADMIN — DIGOXIN SCH MG: 0.12 TABLET ORAL at 15:16

## 2020-03-28 RX ADMIN — FERROUS SULFATE TAB 325 MG (65 MG ELEMENTAL FE) SCH MG: 325 (65 FE) TAB at 11:04

## 2020-03-28 RX ADMIN — TOLVAPTAN SCH MG: 15 TABLET ORAL at 11:02

## 2020-03-28 RX ADMIN — FLUTICASONE FUROATE, UMECLIDINIUM BROMIDE AND VILANTEROL TRIFENATATE SCH INH: 100; 62.5; 25 POWDER RESPIRATORY (INHALATION) at 11:58

## 2020-03-28 RX ADMIN — DILTIAZEM HYDROCHLORIDE SCH MG: 120 CAPSULE, COATED, EXTENDED RELEASE ORAL at 08:00

## 2020-03-28 RX ADMIN — MESALAMINE SCH MG: 250 CAPSULE ORAL at 21:27

## 2020-03-28 RX ADMIN — METOPROLOL SUCCINATE SCH MG: 25 TABLET, EXTENDED RELEASE ORAL at 21:27

## 2020-03-28 RX ADMIN — MESALAMINE SCH MG: 250 CAPSULE ORAL at 11:02

## 2020-03-28 RX ADMIN — FAMOTIDINE SCH: 20 TABLET, FILM COATED ORAL at 14:17

## 2020-03-28 RX ADMIN — LISINOPRIL SCH MG: 10 TABLET ORAL at 11:01

## 2020-03-28 RX ADMIN — ENOXAPARIN SODIUM SCH: 40 INJECTION SUBCUTANEOUS at 10:02

## 2020-03-28 RX ADMIN — SODIUM CHLORIDE PRN MLS/HR: 9 INJECTION, SOLUTION INTRAVENOUS at 07:59

## 2020-03-28 RX ADMIN — ACETAMINOPHEN PRN MG: 325 TABLET ORAL at 11:58

## 2020-03-28 RX ADMIN — SODIUM CHLORIDE PRN MLS/HR: 9 INJECTION, SOLUTION INTRAVENOUS at 20:29

## 2020-03-28 RX ADMIN — ACETAMINOPHEN PRN MG: 325 TABLET ORAL at 23:31

## 2020-03-28 RX ADMIN — MESALAMINE SCH MG: 250 CAPSULE ORAL at 15:16

## 2020-03-28 RX ADMIN — MESALAMINE SCH MG: 250 CAPSULE ORAL at 17:34

## 2020-03-28 RX ADMIN — ATORVASTATIN CALCIUM SCH MG: 40 TABLET, FILM COATED ORAL at 21:27

## 2020-03-28 RX ADMIN — OXYCODONE HYDROCHLORIDE AND ACETAMINOPHEN SCH MG: 500 TABLET ORAL at 08:00

## 2020-03-28 NOTE — PDOC PROGRESS REPORT
Subjective


Progress Note for:: 03/28/20


Subjective:: 





Patient was seen and examined.  He is on nasal cannula oxygen.  He declines any 

complaints.  No change in clinical status.  He was supposed to be transferred 

yesterday apparently but was held by nephrology due to low sodium.


Reason For Visit: 


CAP,COPD EXACERBATION,ACUTE ON CHRONIC AFIB WITH








Physical Exam


Vital Signs: 


                                        











Temp Pulse Resp BP Pulse Ox


 


 98.5 F   91   16   127/44 H  91 L


 


 03/28/20 07:33  03/28/20 07:33  03/28/20 07:33  03/28/20 07:33  03/28/20 07:33








                                 Intake & Output











 03/27/20 03/28/20 03/29/20





 06:59 06:59 06:59


 


Intake Total 2100 980 


 


Output Total 950 2175 


 


Balance 1150 -1195 


 


Weight 129 lb 10.109 oz 128 lb 4.944 oz 











General appearance: PRESENT: no acute distress, cooperative


Head exam: PRESENT: atraumatic, normocephalic


Respiratory exam: PRESENT: clear to auscultation say.  ABSENT: accessory muscle 

use


Cardiovascular exam: PRESENT: RRR


Pulses: PRESENT: normal carotid pulses, normal radial pulses


GI/Abdominal exam: PRESENT: normal bowel sounds, soft.  ABSENT: tenderness





Results


Laboratory Results: 


                                        





                                 03/27/20 04:51 





                                 03/27/20 15:33 





                                        











  03/27/20





  15:33


 


Sodium  127.0 L


 


Potassium  4.3


 


Chloride  98


 


Carbon Dioxide  26


 


Anion Gap  3 L


 


BUN  14


 


Creatinine  0.47 L


 


Est GFR (African Amer)  > 60


 


Glucose  92


 


Calcium  7.3 L








                                        











  03/04/20 03/04/20 03/05/20





  21:51 21:51 00:20


 


Creatine Kinase  128  


 


Troponin I   0.057  0.152


 


NT-Pro-B Natriuret Pep   5170 H 














  03/07/20





  13:37


 


Creatine Kinase 


 


Troponin I 


 


NT-Pro-B Natriuret Pep  5250 H











Impressions: 


                                        





Modified Barium Swallow  03/12/20 00:00


IMPRESSION:  TRACE LARYNGEAL PENETRATION WITHOUT ASPIRATION. PLEASE SEE SPEECH 

PATHOLOGIST REPORT FOR OTHER FINDINGS AND RECOMMENDATIONS.


 








Chest X-Ray  03/24/20 13:56


IMPRESSION:  Cardiomegaly without ronaldo pulmonary edema.  Cannot exclude 

airspace disease in the right upper lobe or lower lobe, pneumonia versus 

atelectasis.


 














Assessment and Plan





- Plan Summary


Summary: 


(1) Hyponatremia


Is this a current diagnosis for this admission?: Yes   


Plan: 


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


Was on scheduled antipsychotics which was been DC'd


urine osmolarity and urine sodium done consistent with SIADH.  Free T4 and a.m. 

cortisol levels are normal.


Sodium continued to drop despite fluid restriction, Lasix and sodium chloride 

tablets.  Improved with tolvaptan.


Patient has been started on daily tolvaptan by nephrology.








(2) Acute respiratory failure with hypoxia


Is this a current diagnosis for this admission?: Yes   


Plan: 


Likely secondary to atypical pneumonia from influenza a and underlying COPD.  

Resolved at this point as patient's SPO2 is in the low 90s on room air and mid 

90s on 1.5 L nasal cannula.








(3) Atrial fibrillation with RVR


Is this a current diagnosis for this admission?: Yes   


Plan: 


Rate controlled. chronic persistent atrial fibrillation. Continue diltiazem, 

digoxin, beta-blockers. Monitor digoxin levels intermittently.


Patient has not been on anticoagulation for over a year.  Likely secondary to 

his falls and feeble state.  I will defer to outpatient cardiologist and 

patient's primary care provider for determination of anticoagulation necessity.








(4) Atypical pneumonia


Is this a current diagnosis for this admission?: Yes   


Plan: 


Likely bacterial infection followed Influenza. Sputum culture on admission 

positive for Streptococcus species.


Received complete dose duration of ceftriaxone & doxycycline.  Also initially 

received vancomycin.


Influenza A also positive.








(5) COPD exacerbation


Is this a current diagnosis for this admission?: Yes   


Plan: 


Acute exacerbation has resolved.  Nebs as needed.








(6) Dysphagia


Qualifiers: 


   Dysphagia type: oropharyngeal phase   Qualified Code(s): R13.12 - Dysphagia, 

oropharyngeal phase   


Is this a current diagnosis for this admission?: Yes   


Plan: 


Status post modified barium swallow study. Mechanical soft diet recommended.








(7) Influenza A


Is this a current diagnosis for this admission?: Yes   


Plan: 


Patient tested positive for influenza A on admission and completed a course of 

Tamiflu.








(8) HTN (hypertension)


Qualifiers: 


   Hypertension type: essential hypertension   Qualified Code(s): I10 - 

Essential (primary) hypertension   


Is this a current diagnosis for this admission?: Yes   


Plan: 


Initial Home medications are: captopril 50 mg p.o. twice daily, diltiazem 120 mg

p.o. daily, Lasix 20 mg p.o. twice daily, metoprolol 25 mg p.o. daily.





Switched captopril to lisinopril for ease of administration.


Continue diltiazem, lisinopril, metoprolol.  Lasix discontinued due to 

hyponatremia.


Adjust meds as needed.  Outpatient PCP follow-up.





(9) Dementia


Often mumbles and talks to himself.  Redirection.  Patient ultimately will need 

supervision and assistance with eating otherwise  would not eat much.  Trial of 

Megace for appetite stimulation.





(10) Acute urinary retention


Noted to have urinary retention several times during hospitalization and as such

patient will be discharged with a Ball to follow-up with a urologist in the 

outpatient setting.  Patient was started on Flomax.

## 2020-03-29 LAB
ALBUMIN SERPL-MCNC: 2.1 G/DL (ref 3.5–5)
ALP SERPL-CCNC: 118 U/L (ref 38–126)
ANION GAP SERPL CALC-SCNC: 5 MMOL/L (ref 5–19)
AST SERPL-CCNC: 27 U/L (ref 17–59)
BILIRUB DIRECT SERPL-MCNC: 0.3 MG/DL (ref 0–0.4)
BILIRUB SERPL-MCNC: 0.9 MG/DL (ref 0.2–1.3)
BUN SERPL-MCNC: 14 MG/DL (ref 7–20)
CALCIUM: 7.3 MG/DL (ref 8.4–10.2)
CHLORIDE SERPL-SCNC: 103 MMOL/L (ref 98–107)
CO2 SERPL-SCNC: 22 MMOL/L (ref 22–30)
GLUCOSE SERPL-MCNC: 89 MG/DL (ref 75–110)
POTASSIUM SERPL-SCNC: 3.9 MMOL/L (ref 3.6–5)
PROT SERPL-MCNC: 4.6 G/DL (ref 6.3–8.2)

## 2020-03-29 RX ADMIN — METOPROLOL SUCCINATE SCH MG: 25 TABLET, EXTENDED RELEASE ORAL at 09:51

## 2020-03-29 RX ADMIN — DILTIAZEM HYDROCHLORIDE SCH MG: 120 CAPSULE, COATED, EXTENDED RELEASE ORAL at 08:25

## 2020-03-29 RX ADMIN — FAMOTIDINE SCH: 20 TABLET, FILM COATED ORAL at 21:34

## 2020-03-29 RX ADMIN — ATORVASTATIN CALCIUM SCH MG: 40 TABLET, FILM COATED ORAL at 21:31

## 2020-03-29 RX ADMIN — SODIUM CHLORIDE PRN MLS/HR: 9 INJECTION, SOLUTION INTRAVENOUS at 10:07

## 2020-03-29 RX ADMIN — TAMSULOSIN HYDROCHLORIDE SCH MG: 0.4 CAPSULE ORAL at 21:31

## 2020-03-29 RX ADMIN — METOPROLOL SUCCINATE SCH MG: 25 TABLET, EXTENDED RELEASE ORAL at 21:31

## 2020-03-29 RX ADMIN — OXYCODONE HYDROCHLORIDE AND ACETAMINOPHEN SCH MG: 500 TABLET ORAL at 08:25

## 2020-03-29 RX ADMIN — DIGOXIN SCH MG: 0.12 TABLET ORAL at 17:16

## 2020-03-29 RX ADMIN — DUTASTERIDE SCH MG: 0.5 CAPSULE, LIQUID FILLED ORAL at 08:25

## 2020-03-29 RX ADMIN — MESALAMINE SCH MG: 250 CAPSULE ORAL at 17:16

## 2020-03-29 RX ADMIN — ENOXAPARIN SODIUM SCH: 40 INJECTION SUBCUTANEOUS at 09:45

## 2020-03-29 RX ADMIN — TOLVAPTAN SCH MG: 15 TABLET ORAL at 09:51

## 2020-03-29 RX ADMIN — ACETAMINOPHEN PRN MG: 325 TABLET ORAL at 22:01

## 2020-03-29 RX ADMIN — POTASSIUM CHLORIDE SCH MEQ: 750 TABLET, FILM COATED, EXTENDED RELEASE ORAL at 08:25

## 2020-03-29 RX ADMIN — LISINOPRIL SCH MG: 10 TABLET ORAL at 09:50

## 2020-03-29 RX ADMIN — MESALAMINE SCH MG: 250 CAPSULE ORAL at 09:51

## 2020-03-29 RX ADMIN — MESALAMINE SCH MG: 250 CAPSULE ORAL at 14:31

## 2020-03-29 RX ADMIN — MEGESTROL ACETATE SCH MG: 40 SUSPENSION ORAL at 09:50

## 2020-03-29 RX ADMIN — FERROUS SULFATE TAB 325 MG (65 MG ELEMENTAL FE) SCH MG: 325 (65 FE) TAB at 14:31

## 2020-03-29 RX ADMIN — FLUTICASONE FUROATE, UMECLIDINIUM BROMIDE AND VILANTEROL TRIFENATATE SCH INH: 100; 62.5; 25 POWDER RESPIRATORY (INHALATION) at 09:50

## 2020-03-29 RX ADMIN — FAMOTIDINE SCH: 20 TABLET, FILM COATED ORAL at 09:48

## 2020-03-29 RX ADMIN — MESALAMINE SCH MG: 250 CAPSULE ORAL at 21:32

## 2020-03-29 NOTE — PDOC PROGRESS REPORT
Subjective


Progress Note for:: 03/29/20


Subjective:: 





Patient only complaint is that he wants to use the rest room. Had 2 bowel 

movements yesterday. Otherwise feels well.


Reason For Visit: 


CAP,COPD EXACERBATION,ACUTE ON CHRONIC AFIB WITH








Physical Exam


Vital Signs: 


                                        











Temp Pulse Resp BP Pulse Ox


 


 97.7 F   85   16   128/51 H  93 


 


 03/29/20 08:22  03/29/20 10:26  03/29/20 08:22  03/29/20 08:22  03/29/20 10:29








                                 Intake & Output











 03/28/20 03/29/20 03/30/20





 06:59 06:59 06:59


 


Intake Total 980 2593 1000


 


Output Total 2175 800 


 


Balance -1195 1793 1000


 


Weight 58.2 kg 57.8 kg 











General appearance: PRESENT: no acute distress, cooperative


Neck exam: ABSENT: JVD


Respiratory exam: PRESENT: unlabored.  ABSENT: tachypnea, wheezes


Cardiovascular exam: PRESENT: +S1, +S2.  ABSENT: bradycardia, tachycardia


GI/Abdominal exam: PRESENT: soft.  ABSENT: rebound, rigid, tenderness


Neurological exam: PRESENT: alert, awake, oriented to person, oriented to place,

oriented to time, other - Forgetful.  ABSENT: oriented to situation


Focused psych exam: PRESENT: other - Mumbles to himself





Results


Laboratory Results: 


                                        





                                 03/27/20 04:51 





                                 03/29/20 08:44 





                                        











  03/28/20 03/28/20 03/28/20





  11:13 11:39 11:39


 


Sodium    129.7 L


 


Potassium    4.2


 


Chloride    102


 


Carbon Dioxide    22


 


Anion Gap    6


 


BUN    12


 


Creatinine    0.42 L


 


Est GFR ( Amer)    > 60


 


Glucose    100


 


Serum Osmolality   273 L 


 


Calcium    6.8 L*


 


Total Bilirubin   


 


AST   


 


Alkaline Phosphatase   


 


Total Protein   


 


Albumin   


 


Urine Osmolality  353  














  03/28/20 03/29/20





  11:39 08:44


 


Sodium   130.1 L


 


Potassium   3.9


 


Chloride   103


 


Carbon Dioxide   22


 


Anion Gap   5


 


BUN   14


 


Creatinine   0.47 L


 


Est GFR (African Amer)   > 60


 


Glucose   89


 


Serum Osmolality  


 


Calcium   7.3 L


 


Total Bilirubin   0.9


 


AST   27


 


Alkaline Phosphatase   118


 


Total Protein   4.6 L


 


Albumin  2.0 L  2.1 L


 


Urine Osmolality  








                                        











  03/04/20 03/04/20 03/05/20





  21:51 21:51 00:20


 


Creatine Kinase  128  


 


Troponin I   0.057  0.152


 


NT-Pro-B Natriuret Pep   5170 H 














  03/07/20





  13:37


 


Creatine Kinase 


 


Troponin I 


 


NT-Pro-B Natriuret Pep  5250 H











Impressions: 


                                        





Modified Barium Swallow  03/12/20 00:00


IMPRESSION:  TRACE LARYNGEAL PENETRATION WITHOUT ASPIRATION. PLEASE SEE SPEECH 

PATHOLOGIST REPORT FOR OTHER FINDINGS AND RECOMMENDATIONS.


 








Chest X-Ray  03/24/20 13:56


IMPRESSION:  Cardiomegaly without ronaldo pulmonary edema.  Cannot exclude 

airspace disease in the right upper lobe or lower lobe, pneumonia versus ate

lectasis.


 














Assessment and Plan





- Diagnosis


(1) Hyponatremia


Is this a current diagnosis for this admission?: Yes   


Plan: 


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


Continue tolvaptan.  Reduce IV fluids to 60 cc/h.








(2) Acute respiratory failure with hypoxia


Is this a current diagnosis for this admission?: Yes   


Plan: 


Secondary to COPD.  Influenza and pneumonia resolved at this point








(3) Atrial fibrillation with RVR


Is this a current diagnosis for this admission?: Yes   


Plan: 


Rate controlled. chronic persistent atrial fibrillation. Continue diltiazem, 

digoxin, beta-blockers. Monitor dig level.


Patient has not been on anticoagulation for over a year.  Likely secondary to 

his falls and feeble state.  I will defer to outpatient cardiologist and 

patient's primary care provider for determination of anticoagulation necessity.











(4) Atypical pneumonia


Is this a current diagnosis for this admission?: Yes   


Plan: 


SPO2 WNL on 2 L. Afebrile.  WBC WNL. Sputum culture on admission positive for 

Streptococcus species.


Likely bacterial infection followed Influenza. Received complete dose of 

ceftriaxone/doxycycline (azithromycin cannot be used due to very long QTC on 

admission)


Received 6 days of IV antibiotics. Received 3 days of IV vancomycin. Vancomycin 

was DC'd 3/17/2020.


No need for further antibiotics at this time. Reviewed repeat CXR.








(5) COPD exacerbation


Is this a current diagnosis for this admission?: Yes   


Plan: 


Acute exacerbation has resolved.  Nebs as needed.








(6) Dysphagia


Qualifiers: 


   Dysphagia type: oropharyngeal phase   Qualified Code(s): R13.12 - Dysphagia, 

oropharyngeal phase   


Is this a current diagnosis for this admission?: Yes   





(7) Influenza A


Is this a current diagnosis for this admission?: Yes   


Plan: 


Patient tested positive for influenza A on admission and completed a course of 

Tamiflu.








(8) HTN (hypertension)


Qualifiers: 


   Hypertension type: essential hypertension   Qualified Code(s): I10 - 

Essential (primary) hypertension   


Is this a current diagnosis for this admission?: Yes   





(9) Dementia


Is this a current diagnosis for this admission?: Yes   





(10) Acute urinary retention


Is this a current diagnosis for this admission?: Yes   


Plan: 


We will remove Ball today.  Continue Flomax.  Check postvoid residuals 6 hours 

and 12 hours after removal of Ball.








- Plan Summary


Summary: 


Discharge to [place] tomorrow.  Unfortunately [facility] states that they are 

unable to take patient today because they are short handed in terms of 

administrative staff.





- Time


Time Spent with patient: Less than 15 minutes

## 2020-03-30 VITALS — SYSTOLIC BLOOD PRESSURE: 100 MMHG | DIASTOLIC BLOOD PRESSURE: 84 MMHG

## 2020-03-30 RX ADMIN — ACETAMINOPHEN PRN MG: 325 TABLET ORAL at 06:28

## 2020-03-30 RX ADMIN — SODIUM CHLORIDE PRN MLS/HR: 9 INJECTION, SOLUTION INTRAVENOUS at 00:10

## 2020-03-30 RX ADMIN — LISINOPRIL SCH MG: 10 TABLET ORAL at 09:39

## 2020-03-30 RX ADMIN — MESALAMINE SCH MG: 250 CAPSULE ORAL at 09:39

## 2020-03-30 RX ADMIN — ENOXAPARIN SODIUM SCH: 40 INJECTION SUBCUTANEOUS at 09:33

## 2020-03-30 RX ADMIN — TOLVAPTAN SCH MG: 15 TABLET ORAL at 09:39

## 2020-03-30 RX ADMIN — DILTIAZEM HYDROCHLORIDE SCH MG: 120 CAPSULE, COATED, EXTENDED RELEASE ORAL at 09:39

## 2020-03-30 RX ADMIN — FLUTICASONE FUROATE, UMECLIDINIUM BROMIDE AND VILANTEROL TRIFENATATE SCH INH: 100; 62.5; 25 POWDER RESPIRATORY (INHALATION) at 09:39

## 2020-03-30 RX ADMIN — POTASSIUM CHLORIDE SCH MEQ: 750 TABLET, FILM COATED, EXTENDED RELEASE ORAL at 09:38

## 2020-03-30 RX ADMIN — METOPROLOL SUCCINATE SCH MG: 25 TABLET, EXTENDED RELEASE ORAL at 09:39

## 2020-03-30 RX ADMIN — MEGESTROL ACETATE SCH MG: 40 SUSPENSION ORAL at 09:39

## 2020-03-30 RX ADMIN — FAMOTIDINE SCH: 20 TABLET, FILM COATED ORAL at 09:34

## 2020-03-30 RX ADMIN — OXYCODONE HYDROCHLORIDE AND ACETAMINOPHEN SCH MG: 500 TABLET ORAL at 09:39

## 2020-03-30 RX ADMIN — DUTASTERIDE SCH MG: 0.5 CAPSULE, LIQUID FILLED ORAL at 09:39

## 2020-03-30 NOTE — PROGRESS NOTE
Provider Note


Provider Note: 





Patient seen and evaluated at bedside.  Currently has no complaints besides the 

fact that he did not like the food.  Denies any shortness of breath or chest 

pain.  Vital signs within normal limits.  Awake alert.  No apparent distress.  

Continue treatment of hyponatremia as planned with tolvaptan.  Acute urinary ret

ention seems to persist despite being on Flomax.  Had to be straight cath this 

morning due to PVR over 300.  Will place Ball catheter and discharge patient 

with Ball for urology follow-up outpatient.  Patient stable for discharge and 

will be discharged today to SNF.

## 2020-03-30 NOTE — PDOC PROGRESS REPORT
Subjective


Progress Note for:: 03/30/20


Subjective:: 


Patient was seen sitting up in bed this morning. At the time he was taking his 

medications. He claims to be ready for discharge from the hospital. He denies 

chest pain, increased SOB. Recently had a rodriguez catheter placed. 


Reason For Visit: 


CAP,COPD EXACERBATION,ACUTE ON CHRONIC AFIB WITH








Physical Exam


Vital Signs: 


                                        











Temp Pulse Resp BP Pulse Ox


 


 97.6 F   91   16   100/84   95 


 


 03/30/20 08:12  03/30/20 08:12  03/30/20 08:12  03/30/20 08:12  03/30/20 08:12








                                 Intake & Output











 03/29/20 03/30/20 03/31/20





 06:59 06:59 06:59


 


Intake Total 2593 2720 


 


Output Total 800 1250 


 


Balance 1793 1470 


 


Weight 57.8 kg 58 kg 











General appearance: PRESENT: no acute distress, well-developed, well-nourished


Mouth exam: PRESENT: moist, neck supple


Respiratory exam: PRESENT: crackles - -course through out, rales.  ABSENT: 

accessory muscle use, clear to auscultation say, wheezes


Cardiovascular exam: PRESENT: +S1, +S2


GI/Abdominal exam: PRESENT: soft.  ABSENT: tenderness


Extremities exam: ABSENT: tenderness, +1 edema, +2 edema


Musculoskeletal exam: PRESENT: normal inspection.  ABSENT: tenderness


Neurological exam: PRESENT: alert, awake, oriented to person, oriented to place,

oriented to time, oriented to situation


Skin exam: PRESENT: dry, intact, warm





Results


Laboratory Results: 


                                        





                                 03/27/20 04:51 





                                 03/29/20 08:44 





                                        











  03/04/20 03/04/20 03/05/20





  21:51 21:51 00:20


 


Creatine Kinase  128  


 


Troponin I   0.057  0.152


 


NT-Pro-B Natriuret Pep   5170 H 














  03/07/20





  13:37


 


Creatine Kinase 


 


Troponin I 


 


NT-Pro-B Natriuret Pep  5250 H











Impressions: 


                                        





Modified Barium Swallow  03/12/20 00:00


IMPRESSION:  TRACE LARYNGEAL PENETRATION WITHOUT ASPIRATION. PLEASE SEE SPEECH 

PATHOLOGIST REPORT FOR OTHER FINDINGS AND RECOMMENDATIONS.


 








Chest X-Ray  03/24/20 13:56


IMPRESSION:  Cardiomegaly without ronaldo pulmonary edema.  Cannot exclude 

airspace disease in the right upper lobe or lower lobe, pneumonia versus 

atelectasis.


 














Assessment & Plan





- Diagnosis


(1) Hyponatremia


Is this a current diagnosis for this admission?: Yes   


Plan: 


Will look to continue on the tolvaptan with discharge. Recommend that there be 

no fluid restriction as outpatient once he is on this medication. Also will need

for the nursing facility to send weekly bmps to my office for the first 4 weeks 

while taking tolvaptan.








(2) Hypocalcemia


Plan: 


corrected with current albumin level is WNL








(3) HTN (hypertension)


Qualifiers: 


   Hypertension type: essential hypertension   Qualified Code(s): I10 - 

Essential (primary) hypertension   


Is this a current diagnosis for this admission?: Yes   


Plan: 


controlled








(4) Atypical pneumonia


Is this a current diagnosis for this admission?: Yes   





(5) Influenza A


Is this a current diagnosis for this admission?: Yes   





(6) Anemia


Plan: 


recently was given IV injectofer. Will have the nursing facility follow up with 

a cbc.

## 2020-04-03 ENCOUNTER — HOSPITAL ENCOUNTER (INPATIENT)
Dept: HOSPITAL 62 - ER | Age: 85
LOS: 1 days | Discharge: LEFT BEFORE DISCHARGE | DRG: 189 | End: 2020-04-04
Attending: FAMILY MEDICINE | Admitting: FAMILY MEDICINE
Payer: MEDICARE

## 2020-04-03 DIAGNOSIS — I48.20: ICD-10-CM

## 2020-04-03 DIAGNOSIS — J44.9: ICD-10-CM

## 2020-04-03 DIAGNOSIS — Z79.899: ICD-10-CM

## 2020-04-03 DIAGNOSIS — E87.1: ICD-10-CM

## 2020-04-03 DIAGNOSIS — F03.90: ICD-10-CM

## 2020-04-03 DIAGNOSIS — I50.33: ICD-10-CM

## 2020-04-03 DIAGNOSIS — N40.0: ICD-10-CM

## 2020-04-03 DIAGNOSIS — E83.51: ICD-10-CM

## 2020-04-03 DIAGNOSIS — E78.5: ICD-10-CM

## 2020-04-03 DIAGNOSIS — Z88.0: ICD-10-CM

## 2020-04-03 DIAGNOSIS — D64.9: ICD-10-CM

## 2020-04-03 DIAGNOSIS — J96.01: Primary | ICD-10-CM

## 2020-04-03 DIAGNOSIS — Z66: ICD-10-CM

## 2020-04-03 DIAGNOSIS — K50.90: ICD-10-CM

## 2020-04-03 DIAGNOSIS — Z79.51: ICD-10-CM

## 2020-04-03 DIAGNOSIS — I11.0: ICD-10-CM

## 2020-04-03 LAB
ADD MANUAL DIFF: NO
ALBUMIN SERPL-MCNC: 2.4 G/DL (ref 3.5–5)
ALP SERPL-CCNC: 100 U/L (ref 38–126)
ANION GAP SERPL CALC-SCNC: 9 MMOL/L (ref 5–19)
APPEARANCE UR: (no result)
APTT BLD: 30.8 SEC (ref 23.5–35.8)
APTT PPP: (no result) S
AST SERPL-CCNC: 35 U/L (ref 17–59)
BASOPHILS # BLD AUTO: 0.1 10^3/UL (ref 0–0.2)
BASOPHILS NFR BLD AUTO: 1.1 % (ref 0–2)
BILIRUB DIRECT SERPL-MCNC: 0.3 MG/DL (ref 0–0.4)
BILIRUB SERPL-MCNC: 0.6 MG/DL (ref 0.2–1.3)
BILIRUB UR QL STRIP: NEGATIVE
BUN SERPL-MCNC: 21 MG/DL (ref 7–20)
CALCIUM: 7.4 MG/DL (ref 8.4–10.2)
CHLORIDE SERPL-SCNC: 108 MMOL/L (ref 98–107)
CO2 SERPL-SCNC: 17 MMOL/L (ref 22–30)
EOSINOPHIL # BLD AUTO: 0 10^3/UL (ref 0–0.6)
EOSINOPHIL NFR BLD AUTO: 0 % (ref 0–6)
ERYTHROCYTE [DISTWIDTH] IN BLOOD BY AUTOMATED COUNT: 22.4 % (ref 11.5–14)
GLUCOSE SERPL-MCNC: 97 MG/DL (ref 75–110)
GLUCOSE UR STRIP-MCNC: NEGATIVE MG/DL
HCT VFR BLD CALC: 25 % (ref 37.9–51)
HGB BLD-MCNC: 8.5 G/DL (ref 13.5–17)
INR PPP: 1.7
KETONES UR STRIP-MCNC: NEGATIVE MG/DL
LYMPHOCYTES # BLD AUTO: 0.9 10^3/UL (ref 0.5–4.7)
LYMPHOCYTES NFR BLD AUTO: 10.8 % (ref 13–45)
MCH RBC QN AUTO: 29.6 PG (ref 27–33.4)
MCHC RBC AUTO-ENTMCNC: 33.9 G/DL (ref 32–36)
MCV RBC AUTO: 87 FL (ref 80–97)
MONOCYTES # BLD AUTO: 0.8 10^3/UL (ref 0.1–1.4)
MONOCYTES NFR BLD AUTO: 9.9 % (ref 3–13)
NEUTROPHILS # BLD AUTO: 6.6 10^3/UL (ref 1.7–8.2)
NEUTS SEG NFR BLD AUTO: 78.2 % (ref 42–78)
NITRITE UR QL STRIP: NEGATIVE
NT PRO BNP: 5620 PG/ML (ref ?–450)
PH UR STRIP: 5 [PH] (ref 5–9)
PLATELET # BLD: 323 10^3/UL (ref 150–450)
POTASSIUM SERPL-SCNC: 4.7 MMOL/L (ref 3.6–5)
PROT SERPL-MCNC: 5.3 G/DL (ref 6.3–8.2)
PROT UR STRIP-MCNC: 30 MG/DL
PROTHROMBIN TIME: 20.2 SEC (ref 11.4–15.4)
RBC # BLD AUTO: 2.87 10^6/UL (ref 4.35–5.55)
SP GR UR STRIP: 1.02
TOTAL CELLS COUNTED % (AUTO): 100 %
TROPONIN I SERPL-MCNC: 0.06 NG/ML
UROBILINOGEN UR-MCNC: NEGATIVE MG/DL (ref ?–2)
WBC # BLD AUTO: 8.5 10^3/UL (ref 4–10.5)

## 2020-04-03 PROCEDURE — 83735 ASSAY OF MAGNESIUM: CPT

## 2020-04-03 PROCEDURE — 93010 ELECTROCARDIOGRAM REPORT: CPT

## 2020-04-03 PROCEDURE — 85610 PROTHROMBIN TIME: CPT

## 2020-04-03 PROCEDURE — 84484 ASSAY OF TROPONIN QUANT: CPT

## 2020-04-03 PROCEDURE — 82553 CREATINE MB FRACTION: CPT

## 2020-04-03 PROCEDURE — 80076 HEPATIC FUNCTION PANEL: CPT

## 2020-04-03 PROCEDURE — 82803 BLOOD GASES ANY COMBINATION: CPT

## 2020-04-03 PROCEDURE — 85730 THROMBOPLASTIN TIME PARTIAL: CPT

## 2020-04-03 PROCEDURE — 87040 BLOOD CULTURE FOR BACTERIA: CPT

## 2020-04-03 PROCEDURE — 83605 ASSAY OF LACTIC ACID: CPT

## 2020-04-03 PROCEDURE — 80162 ASSAY OF DIGOXIN TOTAL: CPT

## 2020-04-03 PROCEDURE — 83880 ASSAY OF NATRIURETIC PEPTIDE: CPT

## 2020-04-03 PROCEDURE — 81001 URINALYSIS AUTO W/SCOPE: CPT

## 2020-04-03 PROCEDURE — 82550 ASSAY OF CK (CPK): CPT

## 2020-04-03 PROCEDURE — 85025 COMPLETE CBC W/AUTO DIFF WBC: CPT

## 2020-04-03 PROCEDURE — 5A09357 ASSISTANCE WITH RESPIRATORY VENTILATION, LESS THAN 24 CONSECUTIVE HOURS, CONTINUOUS POSITIVE AIRWAY PRESSURE: ICD-10-PCS | Performed by: FAMILY MEDICINE

## 2020-04-03 PROCEDURE — C9113 INJ PANTOPRAZOLE SODIUM, VIA: HCPCS

## 2020-04-03 PROCEDURE — 93005 ELECTROCARDIOGRAM TRACING: CPT

## 2020-04-03 PROCEDURE — 80053 COMPREHEN METABOLIC PANEL: CPT

## 2020-04-03 PROCEDURE — 36600 WITHDRAWAL OF ARTERIAL BLOOD: CPT

## 2020-04-03 PROCEDURE — 96365 THER/PROPH/DIAG IV INF INIT: CPT

## 2020-04-03 PROCEDURE — 71045 X-RAY EXAM CHEST 1 VIEW: CPT

## 2020-04-03 PROCEDURE — 96375 TX/PRO/DX INJ NEW DRUG ADDON: CPT

## 2020-04-03 PROCEDURE — 36415 COLL VENOUS BLD VENIPUNCTURE: CPT

## 2020-04-03 PROCEDURE — 80048 BASIC METABOLIC PNL TOTAL CA: CPT

## 2020-04-03 PROCEDURE — 99285 EMERGENCY DEPT VISIT HI MDM: CPT

## 2020-04-03 PROCEDURE — 94660 CPAP INITIATION&MGMT: CPT

## 2020-04-03 RX ADMIN — HEPARIN SODIUM SCH UNIT: 5000 INJECTION, SOLUTION INTRAVENOUS; SUBCUTANEOUS at 21:50

## 2020-04-03 RX ADMIN — Medication SCH ML: at 21:53

## 2020-04-03 NOTE — RADIOLOGY REPORT (SQ)
EXAM DESCRIPTION:  CHEST SINGLE VIEW



IMAGES COMPLETED DATE/TIME:  4/3/2020 4:56 pm



REASON FOR STUDY:  shortness of breath



COMPARISON:  3/24/2020



EXAM PARAMETERS:  NUMBER OF VIEWS: One view.

TECHNIQUE: Single frontal radiographic view of the chest acquired.

RADIATION DOSE: NA

LIMITATIONS: None.



FINDINGS:  LUNGS AND PLEURA: Perihilar and basilar predominant interstitial and patchy alveolar opaci
ties, similar to prior.  Small bilateral effusions.  No pneumothorax.

MEDIASTINUM AND HILAR STRUCTURES: No masses.  Contour normal.

HEART AND VASCULAR STRUCTURES: Enlarged, stable.  Central vascular congestion.

BONES: No acute findings.

HARDWARE: None in the chest.

OTHER: No other significant finding.



IMPRESSION:  Enlarged cardiac silhouette, interstitial edema and small bilateral effusions, similar t
o prior.



TECHNICAL DOCUMENTATION:  JOB ID:  [id number]

 2011 [employer]- All Rights Reserved



Reading location - IP/workstation name: [email or web address]

## 2020-04-03 NOTE — ER DOCUMENT REPORT
Entered by DELMA JOHNSTON SCRIBE  04/03/20 [id number] 





Acting as scribe for:ESAU HARRIS MD





ED General





- General


Chief Complaint: Shortness Of Breath


Stated Complaint: SHORTNESS OF BREATH


Primary Care Provider: 


MATTHIAS SIMON MD [Primary Care Provider] - Follow up as needed


Information source: Patient, Outside Facility Records


Notes: 





This 86-year-old male presents to the emergency department with a chief 

complaint of shortness of breath for the past three hours. Patient was brought 

via EMS from [facility]. Staff at [facility] report that patient has had crackles and at

morning check-up was said to be afrebrile. When the nurse returned for blood 

work, patient was in respiratory distress and was running a fever (100).  EMS 

stated that patient was said to be confused and having difficulty breathing. 

Patient arrived on BiPAP and stats were variable from 80%-98%. 


 


TRAVEL OUTSIDE OF THE [place] IN LAST 30 DAYS: No





- Related Data


Allergies/Adverse Reactions: 


                                        





Penicillins Allergy (Verified 03/04/20 22:01)


   











Past Medical History





- General


Information source: Patient





- Social History


Smoking Status: Former Smoker


Cigarette use (# per day): No


Chew tobacco use (# tins/day): No


Family History: Reviewed & Not Pertinent





- Past Medical History


Cardiac Medical History: Reports: Hx Atrial Fibrillation, Hx Congestive Heart 

Failure, Hx Hypercholesterolemia, Hx Hypertension


Pulmonary Medical History: Reports: Hx COPD, Hx Pneumonia


GI Medical History: Reports: Hx Crohn's Disease


Psychiatric Medical History: Reports: Hx Dementia


Past Surgical History: Reports: Hx Vascular Surgery





Review of Systems





- Review of Systems


Constitutional: See HPI, Fever


EENT: No symptoms reported


Cardiovascular: No symptoms reported


Respiratory: See HPI, Short of breath


Gastrointestinal: No symptoms reported


Genitourinary: No symptoms reported


Male Genitourinary: No symptoms reported


Musculoskeletal: No symptoms reported


Skin: No symptoms reported


Hematologic/Lymphatic: No symptoms reported


Neurological/Psychological: See HPI, Confusion


-: Yes All other systems reviewed and negative





Physical Exam





- Vital signs


Vitals: 


                                        











Pulse Resp BP Pulse Ox


 


 89   20   107/64   84 L


 


 04/03/20 16:05  04/03/20 16:05  04/03/20 16:05  04/03/20 16:05














- Notes


Notes: 





Physical Exam:


 


General: Patient was on the BiPAP machine.


 


HEENT: Normocephalic. Atraumatic. PERRL. Extraocular movements intact. 

Oropharynx clear.


 


Neck: Supple. Non-tender.


 


Respiratory: Rales in the basis bilaterally; anteriorly and posteriorly. Stats 

were variable between 80%-98%.


 


Cardiovascular: Tachycardic.


 


Abdominal: Normal Inspection. Non-tender. No distension. Normal Bowel Sounds. 


 


Back: No gross abnormalities. 


 


Extremities: Moves all four extremities.


Upper extremities: Normal inspection. Normal ROM.  


Lower extremities:  1+ edema bilaterally. Normal ROM.


 


Neurological: Normal cognition. AAOx4. Normal speech.  


 


Psychological: Normal affect. Normal Mood. 


 


Skin: Hot. Dry. Pink in color.





Course





- Re-evaluation


Re-evalutation: 





04/03/20 19:50


Patient was on BiPAP with pulse oximetry is ranging between 80 and 98.  

Patient's skin color is pink and is able to speak in sentences with mild dyspnea

with speech.  Patient skin is warm to touch and dry.





- Vital Signs


Vital signs: 


                                        











Temp Pulse Resp BP Pulse Ox


 


    89   36 H  107/64   88 L


 


    04/03/20 16:05  04/03/20 19:01  04/03/20 16:05  04/03/20 16:59














- Laboratory


Result Diagrams: 


                                 04/03/20 17:45





                                 04/03/20 16:30


Laboratory results interpreted by me: 


                                        











  04/03/20 04/03/20 04/03/20





  16:30 16:30 16:30


 


RBC   


 


Hgb   


 


Hct   


 


RDW   


 


Lymph % (Auto)   


 


Seg Neutrophils %   


 


PT   


 


Sodium  134.0 L  


 


Chloride  108 H  


 


Carbon Dioxide  17 L  


 


BUN  21 H  


 


Lactic Acid   3.5 H 


 


Calcium  7.4 L  


 


NT-Pro-B Natriuret Pep    5620 H


 


Total Protein  5.3 L  


 


Albumin  2.4 L  














  04/03/20 04/03/20





  16:30 17:45


 


RBC   2.87 L


 


Hgb   8.5 L


 


Hct   25.0 L


 


RDW   22.4 H


 


Lymph % (Auto)   10.8 L


 


Seg Neutrophils %   78.2 H


 


PT  20.2 H 


 


Sodium  


 


Chloride  


 


Carbon Dioxide  


 


BUN  


 


Lactic Acid  


 


Calcium  


 


NT-Pro-B Natriuret Pep  


 


Total Protein  


 


Albumin  








Patient has congestive heart failure both clinically on exam and chest x-ray.  

Patient's lab work supportive of increased BNP as well.  Patient has a chronic 

anemia.  Patient also had elevated lactic acid of 3.5.





- Diagnostic Test


Radiology reviewed: Image reviewed, Reports reviewed


Radiology results interpreted by me: 





04/03/20 19:53


Chest x-ray shows cardiomegaly and interstitial congestive markings.  Consistent

with CHF.  No defined infiltrate of pneumonia noted.


04/03/20 19:53


Twelve-lead EKG today at time 1612 shows atrial fibrillation rate of 86 with a 

controlled ventricular response PVCs noted.  Low voltage in the frontal leads 

noted and repolarization abnormality changes suggestive of ischemia.





Critical Care Note





- Critical Care Note


Total time excluding time spent on procedures (mins): 35 - Respiratory distress 

management with chronic atrial fibrillation warm skin to touch with a history of

fever and elevated lactic acid.  Management of fluids antibiotics oxygen through

BiPAP, and treatment for congestive heart failure unloading preload and 

diuresing.





Discharge





- Discharge


Clinical Impression: 


 CHF exacerbation, Chronic a-fib, Low oxygen saturation, Fever, Elevated lactic 

acid level, Respiratory distress, acute, Anemia





Condition: Fair


Disposition: ADMITTED AS INPATIENT


Admitting Provider: Weiland (Hospitalist)


Unit Admitted: [facility]MCU


Referrals: 


MATTHIAS SIMON MD [Primary Care Provider] - Follow up as needed





I personally performed the services described in the documentation, reviewed and

edited the documentation which was dictated to the scribe in my presence, and it

accurately records my words and actions.

## 2020-04-04 VITALS — SYSTOLIC BLOOD PRESSURE: 72 MMHG | DIASTOLIC BLOOD PRESSURE: 34 MMHG

## 2020-04-04 LAB
ADD MANUAL DIFF: NO
ARTERIAL BLOOD FIO2: (no result)
ARTERIAL BLOOD H2CO3: 0.92 MMOL/L (ref 1.05–1.35)
ARTERIAL BLOOD HCO3: 17.5 MMOL/L (ref 20–24)
ARTERIAL BLOOD PCO2: 30.7 MMHG (ref 35–45)
ARTERIAL BLOOD PH: 7.37 (ref 7.35–7.45)
ARTERIAL BLOOD PO2: 251.8 MMHG (ref 80–100)
ARTERIAL BLOOD TOTAL CO2: 18.5 MMOL/L (ref 23–27)
BASE EXCESS BLDA CALC-SCNC: -6.9 MMOL/L
BASOPHILS # BLD AUTO: 0 10^3/UL (ref 0–0.2)
BASOPHILS NFR BLD AUTO: 0.4 % (ref 0–2)
CK MB SERPL-MCNC: 4.56 NG/ML (ref ?–4.55)
CK MB SERPL-MCNC: 7.1 NG/ML (ref ?–4.55)
CK MB SERPL-MCNC: 7.98 NG/ML (ref ?–4.55)
CK SERPL-CCNC: 216 U/L (ref 55–170)
DIGOXIN SERPL-MCNC: 1.04 NG/ML (ref 0.8–2)
EOSINOPHIL # BLD AUTO: 0 10^3/UL (ref 0–0.6)
EOSINOPHIL NFR BLD AUTO: 0 % (ref 0–6)
ERYTHROCYTE [DISTWIDTH] IN BLOOD BY AUTOMATED COUNT: 22.3 % (ref 11.5–14)
HCT VFR BLD CALC: 26 % (ref 37.9–51)
HGB BLD-MCNC: 8.8 G/DL (ref 13.5–17)
LYMPHOCYTES # BLD AUTO: 1.1 10^3/UL (ref 0.5–4.7)
LYMPHOCYTES NFR BLD AUTO: 15 % (ref 13–45)
MCH RBC QN AUTO: 29.1 PG (ref 27–33.4)
MCHC RBC AUTO-ENTMCNC: 33.7 G/DL (ref 32–36)
MCV RBC AUTO: 86 FL (ref 80–97)
MONOCYTES # BLD AUTO: 0.5 10^3/UL (ref 0.1–1.4)
MONOCYTES NFR BLD AUTO: 7.2 % (ref 3–13)
NEUTROPHILS # BLD AUTO: 5.9 10^3/UL (ref 1.7–8.2)
NEUTS SEG NFR BLD AUTO: 77.4 % (ref 42–78)
PLATELET # BLD: 322 10^3/UL (ref 150–450)
RBC # BLD AUTO: 3.01 10^6/UL (ref 4.35–5.55)
SAO2 % BLDA: 99.6 % (ref 94–98)
TOTAL CELLS COUNTED % (AUTO): 100 %
TROPONIN I SERPL-MCNC: 0.07 NG/ML
TROPONIN I SERPL-MCNC: 0.08 NG/ML
TROPONIN I SERPL-MCNC: 0.08 NG/ML
WBC # BLD AUTO: 7.6 10^3/UL (ref 4–10.5)

## 2020-04-04 RX ADMIN — HEPARIN SODIUM SCH: 5000 INJECTION, SOLUTION INTRAVENOUS; SUBCUTANEOUS at 07:51

## 2020-04-04 RX ADMIN — Medication SCH: at 15:38

## 2020-04-04 RX ADMIN — MORPHINE SULFATE PRN MG: 10 INJECTION INTRAMUSCULAR; INTRAVENOUS; SUBCUTANEOUS at 13:54

## 2020-04-04 RX ADMIN — FUROSEMIDE SCH: 10 INJECTION, SOLUTION INTRAMUSCULAR; INTRAVENOUS at 13:53

## 2020-04-04 RX ADMIN — Medication SCH ML: at 20:59

## 2020-04-04 RX ADMIN — Medication SCH: at 07:51

## 2020-04-04 RX ADMIN — FUROSEMIDE SCH: 10 INJECTION, SOLUTION INTRAMUSCULAR; INTRAVENOUS at 01:31

## 2020-04-04 RX ADMIN — MORPHINE SULFATE PRN MG: 10 INJECTION INTRAMUSCULAR; INTRAVENOUS; SUBCUTANEOUS at 19:02

## 2020-04-04 RX ADMIN — FUROSEMIDE SCH: 10 INJECTION, SOLUTION INTRAMUSCULAR; INTRAVENOUS at 09:00

## 2020-04-04 NOTE — ADVANCED CARE
- Diagnosis


(1) Pulmonary edema with congestive heart failure


Diagnosis Current: Yes   





(2) Acute respiratory failure with hypoxia


Diagnosis Current: Yes   





(3) CHF exacerbation


Diagnosis Current: Yes   





(4) COPD (chronic obstructive pulmonary disease)


Diagnosis Current: Yes   





(5) HTN (hypertension)


Diagnosis Current: Yes   





(6) HLD (hyperlipidemia)


Diagnosis Current: Yes   





(7) Chronic a-fib


Diagnosis Current: Yes   





(8) Crohn's disease


Diagnosis Current: Yes   





(9) Dementia


Diagnosis Current: Yes   





(10) Elevated lactic acid level


Diagnosis Current: Yes   





(11) Hyponatremia


Diagnosis Current: Yes   





(12) Hypocalcemia


Diagnosis Current: Yes   


Attendance: 


Jessica Wright, myself and the patient


Resuscitation Status: Comfort Measures Only


Discussion: 


I had a lengthy discussion with the patient's designated medical decision-maker 

in his presence in his room.  We discussed comfort measures care at length and 

she expressed the desire to go to comfort measures care after she discussed with

her brother.


Care Planning Goals: 


Comfort measures only care to be instituted today after patient discusses 

decision with her brother.


Document(s) Completed: 


Comfort measures only care orders will be issued and cosigned as soon as Jessica

gets back to our service with the approval.


Time Spent: 26 minutes

## 2020-04-04 NOTE — PDOC PROGRESS REPORT
Subjective


Progress Note for:: 04/04/20


Subjective:: 





Patient remains on BiPAP.  He was on dobutamine and dopamine and his blood 

pressure was still low.  I had a long talk with his daughter today as she is 

decided to make him comfort measures.


Reason For Visit: 


ACUTE PULMONARY EDEMA,ACUTE RESPIRATORY FAILURE








Physical Exam


Vital Signs: 


                                        











Temp Pulse Resp BP Pulse Ox


 


 98.0 F   63   22 H  79/29 L  93 


 


 04/04/20 11:55  04/04/20 11:55  04/04/20 11:17  04/04/20 12:00  04/04/20 08:00








                                 Intake & Output











 04/03/20 04/04/20 04/05/20





 06:59 06:59 06:59


 


Intake Total  259 0


 


Output Total  375 


 


Balance  -116 0


 


Weight  57.3 kg 











General appearance: PRESENT: disheveled, thin, other - Moderate distress


Respiratory exam: PRESENT: accessory muscle use, decreased breath sounds, 

symmetrical, tachypnea.  ABSENT: chest wall tenderness, crackles, prolonged 

expiratory phas, retraction, rhonchi, unlabored, wheezes


Cardiovascular exam: PRESENT: tachycardia - Frequent PVCs


Pulses: PRESENT: normal carotid pulses


Vascular exam: PRESENT: normal capillary refill


GI/Abdominal exam: PRESENT: hypoactive bowel sounds, soft.  ABSENT: distended, 

guarding, rebound, tenderness


Extremities exam: ABSENT: clubbing, pedal edema


Musculoskeletal exam: PRESENT: normal inspection.  ABSENT: deformity


Neurological exam: PRESENT: altered


Psychiatric exam: PRESENT: anxious


Skin exam: PRESENT: dry, warm





Results


Laboratory Results: 


                                        





                                 04/04/20 07:57 





                                 04/03/20 16:30 





                                        











  04/03/20 04/03/20 04/03/20





  16:30 16:30 16:30


 


WBC  Cancelled  


 


RBC  Cancelled  


 


Hgb  Cancelled  


 


Hct  Cancelled  


 


MCV  Cancelled  


 


MCH  Cancelled  


 


MCHC  Cancelled  


 


RDW  Cancelled  


 


Plt Count  Cancelled  


 


Seg Neutrophils %  Cancelled  


 


Carbonic Acid   


 


HCO3/H2CO3 Ratio   


 


ABG pH   


 


ABG pCO2   


 


ABG pO2   


 


ABG HCO3   


 


ABG O2 Saturation   


 


ABG Base Excess   


 


FiO2   


 


Sodium   134.0 L 


 


Potassium   4.7 


 


Chloride   108 H 


 


Carbon Dioxide   17 L 


 


Anion Gap   9 


 


BUN   21 H 


 


Creatinine   0.91 


 


Est GFR ( Amer)   > 60 


 


Glucose   97 


 


Lactic Acid    3.5 H


 


Calcium   7.4 L 


 


Magnesium   


 


Total Bilirubin   0.6 


 


AST   35 


 


Alkaline Phosphatase   100 


 


Total Protein   5.3 L 


 


Albumin   2.4 L 


 


Urine Color   


 


Urine Appearance   


 


Urine pH   


 


Ur Specific Gravity   


 


Urine Protein   


 


Urine Glucose (UA)   


 


Urine Ketones   


 


Urine Blood   


 


Urine Nitrite   


 


Ur Leukocyte Esterase   


 


Urine WBC (Auto)   


 


Urine RBC (Auto)   














  04/03/20 04/03/20 04/03/20





  17:45 19:41 23:30


 


WBC  8.5  


 


RBC  2.87 L  


 


Hgb  8.5 L  


 


Hct  25.0 L  


 


MCV  87  


 


MCH  29.6  


 


MCHC  33.9  


 


RDW  22.4 H  


 


Plt Count  323  


 


Seg Neutrophils %  78.2 H  


 


Carbonic Acid   


 


HCO3/H2CO3 Ratio   


 


ABG pH   


 


ABG pCO2   


 


ABG pO2   


 


ABG HCO3   


 


ABG O2 Saturation   


 


ABG Base Excess   


 


FiO2   


 


Sodium   


 


Potassium   


 


Chloride   


 


Carbon Dioxide   


 


Anion Gap   


 


BUN   


 


Creatinine   


 


Est GFR (African Amer)   


 


Glucose   


 


Lactic Acid    2.0


 


Calcium   


 


Magnesium   


 


Total Bilirubin   


 


AST   


 


Alkaline Phosphatase   


 


Total Protein   


 


Albumin   


 


Urine Color   PAT 


 


Urine Appearance   CLOUDY 


 


Urine pH   5.0 


 


Ur Specific Gravity   1.016 


 


Urine Protein   30 H 


 


Urine Glucose (UA)   NEGATIVE 


 


Urine Ketones   NEGATIVE 


 


Urine Blood   SMALL H 


 


Urine Nitrite   NEGATIVE 


 


Ur Leukocyte Esterase   NEGATIVE 


 


Urine WBC (Auto)   11 


 


Urine RBC (Auto)   131 














  04/04/20 04/04/20 04/04/20





  01:00 04:02 07:57


 


WBC   


 


RBC   


 


Hgb   


 


Hct   


 


MCV   


 


MCH   


 


MCHC   


 


RDW   


 


Plt Count   


 


Seg Neutrophils %   


 


Carbonic Acid  0.92 L  


 


HCO3/H2CO3 Ratio  19:1  


 


ABG pH  7.37  


 


ABG pCO2  30.7 L  


 


ABG pO2  251.8 H  


 


ABG HCO3  17.5 L  


 


ABG O2 Saturation  99.6 H  


 


ABG Base Excess  -6.9  


 


FiO2  100%  


 


Sodium   


 


Potassium   


 


Chloride   


 


Carbon Dioxide   


 


Anion Gap   


 


BUN   


 


Creatinine   


 


Est GFR (African Amer)   


 


Glucose   


 


Lactic Acid   2.2 H  2.6 H


 


Calcium   


 


Magnesium   


 


Total Bilirubin   


 


AST   


 


Alkaline Phosphatase   


 


Total Protein   


 


Albumin   


 


Urine Color   


 


Urine Appearance   


 


Urine pH   


 


Ur Specific Gravity   


 


Urine Protein   


 


Urine Glucose (UA)   


 


Urine Ketones   


 


Urine Blood   


 


Urine Nitrite   


 


Ur Leukocyte Esterase   


 


Urine WBC (Auto)   


 


Urine RBC (Auto)   














  04/04/20 04/04/20 04/04/20





  07:57 07:57 11:34


 


WBC  7.6  


 


RBC  3.01 L  


 


Hgb  8.8 L  


 


Hct  26.0 L  


 


MCV  86  


 


MCH  29.1  


 


MCHC  33.7  


 


RDW  22.3 H  


 


Plt Count  322  


 


Seg Neutrophils %  77.4  


 


Carbonic Acid   


 


HCO3/H2CO3 Ratio   


 


ABG pH   


 


ABG pCO2   


 


ABG pO2   


 


ABG HCO3   


 


ABG O2 Saturation   


 


ABG Base Excess   


 


FiO2   


 


Sodium   


 


Potassium   


 


Chloride   


 


Carbon Dioxide   


 


Anion Gap   


 


BUN   


 


Creatinine   


 


Est GFR (African Amer)   


 


Glucose   


 


Lactic Acid    1.6


 


Calcium   


 


Magnesium   2.0 


 


Total Bilirubin   


 


AST   


 


Alkaline Phosphatase   


 


Total Protein   


 


Albumin   


 


Urine Color   


 


Urine Appearance   


 


Urine pH   


 


Ur Specific Gravity   


 


Urine Protein   


 


Urine Glucose (UA)   


 


Urine Ketones   


 


Urine Blood   


 


Urine Nitrite   


 


Ur Leukocyte Esterase   


 


Urine WBC (Auto)   


 


Urine RBC (Auto)   








                                        











  04/03/20 04/03/20 04/03/20





  16:30 23:30 23:30


 


Creatine Kinase   216 H 


 


CK-MB (CK-2)    4.56 H


 


Troponin I  0.062   0.075


 


NT-Pro-B Natriuret Pep  5620 H  














  04/04/20 04/04/20 04/04/20





  07:57 07:57 11:29


 


Creatine Kinase  387 H   244 H


 


CK-MB (CK-2)   7.98 H 


 


Troponin I   0.076 


 


NT-Pro-B Natriuret Pep   














  04/04/20





  11:34


 


Creatine Kinase 


 


CK-MB (CK-2)  7.10 H


 


Troponin I  0.082


 


NT-Pro-B Natriuret Pep 











Impressions: 


                                        





Chest X-Ray  04/03/20 16:14


IMPRESSION:  Enlarged cardiac silhouette, interstitial edema and small bilateral

effusions, similar to prior.


 














Assessment and Plan





- Diagnosis


(1) Acute respiratory failure with hypoxia


Is this a current diagnosis for this admission?: Yes   


Plan: 


He was previously in the hospital for 23 days with influenza A and respiratory 

failure associated with it.  He was treated with a course of antibiotics for a 

superimposed potential bacterial pneumonia.  He went to a skilled nursing 

facility and was brought back here a few days later.  I think he was still 

having some lingering effects from his previous illness and I believe he does 

not have the reserve to overcome it.  Spoke with his daughter and she contacted 

her brother and she called back saying that they had decided to make him comfort

measures only.








(2) Pulmonary edema with congestive heart failure


Is this a current diagnosis for this admission?: Yes   


Plan: 


Comfort measures as noted above








- Plan Summary


Summary: 


Patient is admitted to [facility] where he will receive routine supportive and 

symptomatic cares.  He will be treated with BiPAP initially and continued s

upplemental oxygen as needed in order to maintain an adequate oxygen saturation 

level.  He will be treated with IV morphine sulfate 2 mg every hour as needed 

for severe dyspnea (pulmonary edema).  He will be continued on Nitrol 2% paste 1

g every 6 hours.  He will receive Lasix 40 mg IV every 6 hours x3 doses.  He 

will be a DNR status as he has a portable DNR document.  He will be continued on

his usual medications as appropriate when his medication list has been verified 

and reconciled.  Serial cardiac enzymes, serial lactic acid levels, CBCs, 

metabolic profiles and magnesium levels will be obtained as appropriate.  

Routine CHF protocol laboratory evaluations will be obtained.  He will be on a 

cardiac diet.





- Time


Time Spent with patient: 15-24 minutes

## 2020-04-04 NOTE — PDOC H&P
History of Present Illness


Admission Date/PCP: 


04/03/2020  19:45


MATTHIAS SIMON MD


Patient complains of: Dyspnea


History of Present Illness: 


LUIS F VELA is a 86 year old male who presented to the emergency room via EMS 

with acute dyspnea.  The patient is a resident at [facility] and s

[facility] from severe dementia and therefore is unable to provide any historical 

input.  The nursing home staff noted the patient became severely dyspneic over 

the course of approximately 3 hours prior to being sent to the emergency room.  

They also noted he seemed more confused than usual.  In the emergency room 

patient was found to have hypoxia with acute pulmonary edema noted on chest x-

ray.  He was treated with BiPAP, nitroglycerin paste, intravenous morphine 

sulfate and IV Lasix.  He showed improvement with treatment and was subsequently

admitted to the hospital for further evaluation and treatment.





Past Medical History


Past Medical History: 


Due to the patient's severe dementia all information presented herein is 

obtained from the most reliable available source.


Cardiac Medical History: Reports: Atrial Fibrillation, Congestive Heart Failure,

Hyperlipidema, Hypertension


Pulmonary Medical History: Reports: Chronic Obstructive Pulmonary Disease 

(COPD), Pneumonia, Respiratory Failure


Neurological Medical History: 


   Denies: Hemorrhagic CVA, Seizures


Endocrine Medical History: 


   Denies: Diabetes Mellitus Type 1, Diabetes Mellitus Type 2


Renal/ Medical History: Reports: Other - Benign prostatic hyperplasia


   Denies: Chronic Kidney Disease, Nephrolithiasis


Malignancy Medical History: Reports: None


GI Medical History: Reports: Crohn's Disease


   Denies: Cirrhosis, Hepatitis, Ulcerative Colitis


Musculoskeltal Medical History: 


   Denies: Fibromyalgia, Gout


Skin Medical History: 


   Denies: Eczema, Psoriasis


Psychiatric Medical History: Reports: Tobacco Dependency


   Denies: Alcohol Dependency, Substance Abuse


Traumatic Medical History: Reports: None


Hematology: Reports: Anemia - Chronic


   Denies: Bleeding Tendencies


Infectious Medical History: Reports: None





Past Surgical History


Past Surgical History: 


Due to the patient's severe dementia all information presented herein is obt

ained from the most reliable available source.


Past Surgical History: Reports: Vascular Surgery





Social History


Information Source: Emergency Med Personnel, [facility] Records


Lives with: Nursing Home


Smoking Status: Former Smoker


Electronic Cigarette use?: No


Frequency of Alcohol Use: None


Hx Recreational Drug Use: No


Drugs: None


Past Social History Note: 


Due to the patient's severe dementia all information presented herein is 

obtained from the most reliable available source.





- Advance Directive


Resuscitation Status: Do Not Resuscitate


Surrogate healthcare decision maker:: 


Jessica Wright





Family History


Family History: 


Due to the patient's severe dementia all information presented herein is 

obtained from the most reliable available source.


Parental Family History Reviewed: No - Unable to obtain


Children Family History Reviewed: NA - Unable to obtain


Sibling(s) Family History Reviewed.: NA - Unable to obtain





Medication/Allergy


Home Medications: 








Albuterol Sulfate [Proair HFA Inhalation Aerosol 8.5 gm MDI] 2 puff IH Q4HP PRN 

03/05/20 


Ascorbic Acid [Vitamin C] 1,000 mg PO DAILY@0800 03/05/20 


Atorvastatin Calcium [Lipitor 40 mg Tablet] 40 mg PO QHS 03/05/20 


Cetirizine HCl [Zyrtec 10 mg Tablet] 10 mg PO HSP PRN 03/05/20 


Digoxin 0.125 mg PO DAILY@1600 03/05/20 


Diltiazem HCl [Diltiazem 24Hr ER (Cd)] 120 mg PO DAILY@0800 03/05/20 


Dutasteride 0.5 mg PO DAILY@0800 03/05/20 


Ferrous Sulfate [Feosol 325 mg Tablet] 325 mg PO DAILY@1200 03/05/20 


Mesalamine [Pentasa] 1,000 mg PO QID 03/05/20 


Metoprolol Succinate [Toprol Xl 25 mg Tab.sr] 25 mg PO DAILY@0800 03/05/20 


Potassium Chloride 20 meq PO DAILY@0800 03/05/20 


Silver Sulfadiazine [Ssd] 1 applic TOP BIDP PRN 03/05/20 


Tamsulosin HCl [Flomax] 0.4 mg PO QHS 03/05/20 


Acetaminophen [Tylenol 325 mg Tablet] 650 mg PO Q4HP PRN  tablet 03/27/20 


Budesonide/Formoterol Fumarate [Symbicort Hfa 160-4.5 Mcg Inhaler 6 gm] 1 puff 

IH Q12 #1 inhaler 03/27/20 


Famotidine [Pepcid 20 mg Tablet] 20 mg PO Q12  tablet 03/27/20 


Lisinopril [Prinivil 10 mg Tablet] 40 mg PO DAILY  tablet 03/27/20 


Megestrol Acetate [Megace Lenore 400 mg/10 ml Udcup] 800 mg PO DAILY  udc 03/27/20




Tolvaptan [Samsca 15 mg Tablet] 15 mg PO DAILY  tablet 03/27/20 








Allergies/Adverse Reactions: 


                                        





Penicillins Allergy (Verified 03/04/20 22:01)


   











Review of Systems


ROS unobtainable: Due to mental status - Severe dementia





Physical Exam


Vital Signs: 


                                        











Temp Pulse Resp BP Pulse Ox


 


    89   36 H  107/64   88 L


 


    04/03/20 16:05  04/03/20 19:01  04/03/20 16:05  04/03/20 16:59








                                 Intake & Output











 04/01/20 04/02/20 04/03/20





 23:59 23:59 23:59


 


Weight   55 kg











General appearance: PRESENT: no acute distress, cooperative, other - On BiPAP at

the time of my evaluation


Head exam: PRESENT: atraumatic, normocephalic


Eye exam: ABSENT: conjunctival injection, scleral icterus


Ear exam: PRESENT: normal external ear exam.  ABSENT: bleeding, drainage


Mouth exam: PRESENT: dry mucosa, neck supple


Neck exam: ABSENT: thyromegaly, tracheal deviation


Respiratory exam: PRESENT: prolonged expiratory phas - Mildly prolonged 

expiratory phase throughout all fields, rales - Fine rales in the lower one 

third of both lung fields., symmetrical


Cardiovascular exam: PRESENT: gallop - S4 gallop rhythm, irregular rhythm - 

Irregularly irregular rate and rhythm.  ABSENT: clicks, rubs


Pulses: PRESENT: normal radial pulses, normal dorsalis pedis pul


Vascular exam: PRESENT: normal capillary refill.  ABSENT: pallor


GI/Abdominal exam: PRESENT: normal bowel sounds, soft


Rectal exam: PRESENT: deferred


Extremities exam: PRESENT: pedal edema, +1 edema - Bilateral pretibial edema.  

ABSENT: joint swelling


Musculoskeletal exam: ABSENT: deformity, dislocation


Neurological exam: PRESENT: awake, CN II-XII grossly intact.  ABSENT: oriented 

to person, oriented to place, oriented to time, oriented to situation


Psychiatric exam: PRESENT: flat affect, other - Detached from situation


Skin exam: PRESENT: dry, intact, warm.  ABSENT: jaundice, rash, urticaria





Results


Laboratory Results: 


                                        





                                 04/03/20 17:45 





                                 04/03/20 16:30 





                                        











  04/03/20 04/03/20 04/03/20





  16:30 16:30 16:30


 


WBC  Cancelled  


 


RBC  Cancelled  


 


Hgb  Cancelled  


 


Hct  Cancelled  


 


MCV  Cancelled  


 


MCH  Cancelled  


 


MCHC  Cancelled  


 


RDW  Cancelled  


 


Plt Count  Cancelled  


 


Seg Neutrophils %  Cancelled  


 


Sodium   134.0 L 


 


Potassium   4.7 


 


Chloride   108 H 


 


Carbon Dioxide   17 L 


 


Anion Gap   9 


 


BUN   21 H 


 


Creatinine   0.91 


 


Est GFR ( Amer)   > 60 


 


Glucose   97 


 


Lactic Acid    3.5 H


 


Calcium   7.4 L 


 


Total Bilirubin   0.6 


 


AST   35 


 


Alkaline Phosphatase   100 


 


Total Protein   5.3 L 


 


Albumin   2.4 L 














  04/03/20





  17:45


 


WBC  8.5


 


RBC  2.87 L


 


Hgb  8.5 L


 


Hct  25.0 L


 


MCV  87


 


MCH  29.6


 


MCHC  33.9


 


RDW  22.4 H


 


Plt Count  323


 


Seg Neutrophils %  78.2 H


 


Sodium 


 


Potassium 


 


Chloride 


 


Carbon Dioxide 


 


Anion Gap 


 


BUN 


 


Creatinine 


 


Est GFR (African Amer) 


 


Glucose 


 


Lactic Acid 


 


Calcium 


 


Total Bilirubin 


 


AST 


 


Alkaline Phosphatase 


 


Total Protein 


 


Albumin 








                                        











  04/03/20





  16:30


 


Troponin I  0.062


 


NT-Pro-B Natriuret Pep  5620 H











Impressions: 


                                        





Chest X-Ray  04/03/20 16:14


IMPRESSION:  Enlarged cardiac silhouette, interstitial edema and small bilateral

effusions, similar to prior.


 














Assessment and Plan





- Diagnosis


(1) Pulmonary edema with congestive heart failure


Is this a current diagnosis for this admission?: Yes   





(2) Acute respiratory failure with hypoxia


Is this a current diagnosis for this admission?: Yes   





(3) CHF exacerbation


Qualifiers: 


 


Is this a current diagnosis for this admission?: Yes   





(4) COPD (chronic obstructive pulmonary disease)


Qualifiers: 


   COPD type: unspecified COPD   Qualified Code(s): J44.9 - Chronic obstructive 

pulmonary disease, unspecified   


Is this a current diagnosis for this admission?: Yes   





(5) HTN (hypertension)


Qualifiers: 


   Hypertension type: essential hypertension   Qualified Code(s): I10 - 

Essential (primary) hypertension   


Is this a current diagnosis for this admission?: Yes   





(6) HLD (hyperlipidemia)


Qualifiers: 


   Hyperlipidemia type: unspecified   Qualified Code(s): E78.5 - Hyperlipidemia,

unspecified   


Is this a current diagnosis for this admission?: Yes   





(7) Chronic a-fib


Is this a current diagnosis for this admission?: Yes   





(8) Crohn's disease


Qualifiers: 


   Gastrointestinal tract location: unspecified location   Digestive disease 

complication type: without complication   Qualified Code(s): K50.90 - Crohn's 

disease, unspecified, without complications   


Is this a current diagnosis for this admission?: Yes   





(9) Dementia


Qualifiers: 


   Dementia type: unspecified type   Dementia behavioral disturbance: without 

behavioral disturbance   Qualified Code(s): F03.90 - Unspecified dementia 

without behavioral disturbance   


Is this a current diagnosis for this admission?: Yes   





(10) Elevated lactic acid level


Is this a current diagnosis for this admission?: Yes   





(11) Hyponatremia


Is this a current diagnosis for this admission?: Yes   





(12) Hypocalcemia


Is this a current diagnosis for this admission?: Yes   





- Plan Summary


Summary: 


Patient is admitted to [facility] where he will receive routine supportive and 

symptomatic cares.  He will be treated with BiPAP initially and continued 

supplemental oxygen as needed in order to maintain an adequate oxygen saturation

level.  He will be treated with IV morphine sulfate 2 mg every hour as needed 

for severe dyspnea (pulmonary edema).  He will be continued on Nitrol 2% paste 1

g every 6 hours.  He will receive Lasix 40 mg IV every 6 hours x3 doses.  He 

will be a DNR status as he has a portable DNR document.  He will be continued on

his usual medications as appropriate when his medication list has been verified 

and reconciled.  Serial cardiac enzymes, serial lactic acid levels, CBCs, 

metabolic profiles and magnesium levels will be obtained as appropriate.  

Routine CHF protocol laboratory evaluations will be obtained.  He will be on a 

cardiac diet.





- Time


Time Spent with patient: Less than 15 minutes


Anticipated discharge: SNF





- Inpatient Certification


Based on my medical assessment, after consideration of the patient's 

comorbidities, presenting symptoms, or acuity I expect that the services needed 

warrant INPATIENT care.: Yes


I certify that my determination is in accordance with my understanding of 

Medicare's requirements for reasonable and necessary INPATIENT services [42 CFR 

412.3e].: Yes


Medical Necessity: Significant Comorbidiites Make Outpatient Treatment Too 

Risky, Need Close Monitoring Due to Risk of Patient Decompensation, Need For 

Continuous Telemetry Monitoring, Risk of Complication if Not Cared For in 

Hospital

## 2020-04-05 NOTE — EKG REPORT
SEVERITY:- ABNORMAL ECG -

ATRIAL FIBRILLATION

VENTRICULAR PREMATURE COMPLEX

LOW VOLTAGE IN FRONTAL LEADS

REPOL ABNRM SUGGESTS ISCHEMIA, DIFFUSE LEADS

:

Confirmed by: Ernestina Peter 05-Apr-2020 10:42:00

## 2020-04-05 NOTE — DEATH SUMMARY
Death Summary


Date : 20


Time of Death:: 21:20


Autopsy: No


Resuscitation Status: Comfort Measures Only





- Final Diagnosis


(1) Acute respiratory failure with hypoxia


Is this a current diagnosis for this admission?: Yes   





(2) Pulmonary edema with congestive heart failure


Is this a current diagnosis for this admission?: Yes   


Hospital Course:: 


LUIS F VELA is a 86 year old male who presented to the emergency room via EMS 

with acute dyspnea.  The patient is a resident at [facility] and 

suffers from severe dementia and therefore is unable to provide any historical 

input.  The nursing [place] staff noted the patient became severely dyspneic over 

the course of approximately 3 hours prior to being sent to the emergency room.  

They also noted he seemed more confused than usual.  In the emergency room 

patient was found to have hypoxia with acute pulmonary edema noted on chest x-

ray.  He was treated with BiPAP, nitroglycerin paste, intravenous morphine 

sulfate and IV Lasix.  He showed improvement with treatment and was subsequently

admitted to the hospital for further evaluation and treatment.








He remains BiPAP dependent and wound up requiring dobutamine drip and a dopamine

drip and his blood pressures were still not very good with a systolic of around 

70.  Given his lack of response to treatment, his dependence on BiPAP, his 

recent history, and his longstanding dementia, had a long discussion with his 

daughter regarding his current condition and prognosis.  She eventually decided 

after conferring with her brother to make the patient comfort measures only.  He

was placed on comfort measures and  at 2120 hrs on 2020.